# Patient Record
Sex: MALE | Race: WHITE | NOT HISPANIC OR LATINO | Employment: OTHER | URBAN - METROPOLITAN AREA
[De-identification: names, ages, dates, MRNs, and addresses within clinical notes are randomized per-mention and may not be internally consistent; named-entity substitution may affect disease eponyms.]

---

## 2017-01-16 DIAGNOSIS — G47.00 INSOMNIA: ICD-10-CM

## 2017-01-17 RX ORDER — DOXEPIN HYDROCHLORIDE 150 MG/1
CAPSULE ORAL
Qty: 30 CAPSULE | Refills: 0 | Status: SHIPPED | OUTPATIENT
Start: 2017-01-17 | End: 2017-02-14 | Stop reason: SDUPTHER

## 2017-02-13 DIAGNOSIS — G47.00 INSOMNIA: ICD-10-CM

## 2017-02-13 RX ORDER — TEMAZEPAM 30 MG/1
CAPSULE ORAL
Qty: 30 CAPSULE | Refills: 0 | Status: CANCELLED | OUTPATIENT
Start: 2017-02-13

## 2017-02-14 DIAGNOSIS — G47.00 INSOMNIA, UNSPECIFIED TYPE: ICD-10-CM

## 2017-02-14 NOTE — TELEPHONE ENCOUNTER
----- Message from Alcides Vance sent at 2017  4:12 PM CST -----  Contact: same  Patient called in and is requesting refills on the following two Rx's:    1.  Doxepin 150 mgs     [facility] [address] [address] AT [facility] [address] & [address] [address]  Phone: [phone] Fax: [phone]    2.  Temazepam 30mgs (refill )    [employer] [facility] [address] [facility]  [facility] & [place]   [facility] [address]  Phone: [phone] Fax: [phone]    Patient call back number is [phone]

## 2017-02-15 ENCOUNTER — OFFICE VISIT (OUTPATIENT)
Dept: FAMILY MEDICINE | Facility: CLINIC | Age: 72
End: 2017-02-15
Payer: MEDICARE

## 2017-02-15 VITALS
DIASTOLIC BLOOD PRESSURE: 70 MMHG | BODY MASS INDEX: 25.16 KG/M2 | HEIGHT: 71 IN | SYSTOLIC BLOOD PRESSURE: 123 MMHG | WEIGHT: 179.69 LBS | HEART RATE: 94 BPM

## 2017-02-15 DIAGNOSIS — I10 ESSENTIAL HYPERTENSION: Primary | ICD-10-CM

## 2017-02-15 DIAGNOSIS — K58.9 IRRITABLE BOWEL SYNDROME, UNSPECIFIED TYPE: ICD-10-CM

## 2017-02-15 DIAGNOSIS — E03.9 ACQUIRED HYPOTHYROIDISM: ICD-10-CM

## 2017-02-15 DIAGNOSIS — G47.00 INSOMNIA, UNSPECIFIED TYPE: ICD-10-CM

## 2017-02-15 PROCEDURE — 99213 OFFICE O/P EST LOW 20 MIN: CPT | Mod: PBBFAC,PO | Performed by: FAMILY MEDICINE

## 2017-02-15 PROCEDURE — 99999 PR PBB SHADOW E&M-EST. PATIENT-LVL III: CPT | Mod: PBBFAC,,, | Performed by: FAMILY MEDICINE

## 2017-02-15 PROCEDURE — 99214 OFFICE O/P EST MOD 30 MIN: CPT | Mod: S$PBB,,, | Performed by: FAMILY MEDICINE

## 2017-02-15 RX ORDER — DOXEPIN HYDROCHLORIDE 150 MG/1
CAPSULE ORAL
Qty: 30 CAPSULE | Refills: 0 | Status: SHIPPED | OUTPATIENT
Start: 2017-02-15 | End: 2017-02-15 | Stop reason: SDUPTHER

## 2017-02-15 RX ORDER — DOXEPIN HYDROCHLORIDE 150 MG/1
CAPSULE ORAL
Qty: 30 CAPSULE | Refills: 0 | Status: SHIPPED | OUTPATIENT
Start: 2017-02-15 | End: 2017-03-17 | Stop reason: SDUPTHER

## 2017-02-15 RX ORDER — MELOXICAM 15 MG/1
15 TABLET ORAL DAILY
Qty: 30 TABLET | Refills: 0 | Status: SHIPPED | OUTPATIENT
Start: 2017-02-15 | End: 2017-12-12 | Stop reason: SDUPTHER

## 2017-02-15 RX ORDER — TEMAZEPAM 30 MG/1
CAPSULE ORAL
Qty: 30 CAPSULE | Refills: 0 | Status: SHIPPED | OUTPATIENT
Start: 2017-02-15 | End: 2017-05-03 | Stop reason: SDUPTHER

## 2017-02-15 RX ORDER — TEMAZEPAM 30 MG/1
CAPSULE ORAL
Qty: 30 CAPSULE | Refills: 5 | Status: SHIPPED | OUTPATIENT
Start: 2017-02-15 | End: 2017-02-15 | Stop reason: SDUPTHER

## 2017-02-15 NOTE — MR AVS SNAPSHOT
[facility]  [address]  [facility] [address]  Phone: [phone]  Fax: [phone]                  Eric Buitrago   2/15/2017 9:00 AM   Office Visit    Description:  Male : 1945   Provider:  Shawn Scott MD   Department:  [facility]           Reason for Visit     Medication Refill           Diagnoses this Visit        Comments    Insomnia, unspecified type                To Do List           Future Appointments        Provider Department Dept Phone    2017 8:30 AM LAB, [place] [facility] [phone]    5/3/2017 1:20 PM Shawn Scott MD [facility] [phone]      Goals (5 Years of Data)     None       These Medications        Disp Refills Start End    doxepin (SINEQUAN) 150 MG Cap 30 capsule 0 2/15/2017     TAKE 1 CAPSULE BY MOUTH EVERY DAY    Pharmacy: [facility] [address] Ph #: [phone]       temazepam (RESTORIL) 30 mg capsule 30 capsule 0 2/15/2017     TAKE ONE CAPSULE BY MOUTH AT BEDTIME AS DIRECTED    Pharmacy: [facility] [place] [address] & [facility] Ph #: [phone]       meloxicam (MOBIC) 15 MG tablet 30 tablet 0 2/15/2017     Take 1 tablet (15 mg total) by mouth once daily. - Oral    Pharmacy: [facility] [place] [address] & [facility] Ph #: [phone]         [facility]     [facility] Care [facility] -  Assistance  Registered nurses in the [facility] provide clinical advisement, health education, appointment booking, and other advisory services.  Call for this free service at [phone].             Medications           Message regarding Medications     Verify the changes and/or additions to your medication regime listed below are the same as discussed with your  "clinician today.  If any of these changes or additions are incorrect, please notify your healthcare provider.        START taking these NEW medications        Refills    meloxicam (MOBIC) 15 MG tablet 0    Sig: Take 1 tablet (15 mg total) by mouth once daily.    Class: Normal    Route: Oral      STOP taking these medications     azithromycin (ZITHROMAX Z-CLAUDIA) 250 MG tablet Take 2 tablets on day 1, then 1 tablet daily on days 2 - 5.           Verify that the below list of medications is an accurate representation of the medications you are currently taking.  If none reported, the list may be blank. If incorrect, please contact your healthcare provider. Carry this list with you in case of emergency.           Current Medications     azelastine (ASTELIN) 137 mcg (0.1 %) nasal spray PLACE 1 SPRAY INTO EACH NOSTRIL TWICE DAILY    doxepin (SINEQUAN) 150 MG Cap TAKE 1 CAPSULE BY MOUTH EVERY DAY    fexofenadine (ALLEGRA) 60 MG tablet Take 1 tablet by mouth.    levothyroxine (SYNTHROID) 100 MCG tablet Take 1 tablet (100 mcg total) by mouth once daily.    lisinopril 10 MG tablet Take 1 tablet (10 mg total) by mouth once daily.    mometasone (NASONEX) 50 mcg/actuation nasal spray SPRAY TWICE IN BOTH NOSTRILS DAILY AS NEEDED    tamsulosin (FLOMAX) 0.4 mg Cp24 Take 1 capsule (0.4 mg total) by mouth every evening.    temazepam (RESTORIL) 30 mg capsule TAKE ONE CAPSULE BY MOUTH AT BEDTIME AS DIRECTED    meloxicam (MOBIC) 15 MG tablet Take 1 tablet (15 mg total) by mouth once daily.           Clinical Reference Information           Your Vitals Were     BP Pulse Height Weight BMI    123/70 94 5' 11" (1.803 m) 81.5 kg (179 lb 10.8 oz) 25.06 kg/m2      Blood Pressure          Most Recent Value    BP  123/70      Allergies as of 2/15/2017     No Known Drug Allergies      Immunizations Administered on Date of Encounter - 2/15/2017     Name Date Dose VIS Date Route    Pneumococcal Conjugate - 13 Valent  Incomplete 0.5 mL 11/5/2015 " Intramuscular      Orders Placed During Today's Visit      Normal Orders This Visit    Pneumococcal Conjugate Vaccine (13 Valent) (IM)       [facility] Sign-Up     Activating your [facility] account is as easy as 1-2-3!     1) Visit [email or web address], select Sign Up Now, enter this activation code and your date of birth, then select Next.  [email or web address]  Expires: 4/1/2017  8:46 AM      2) Create a username and password to use when you visit [facility] in the future and select a security question in case you lose your password and select Next.    3) Enter your e-mail address and click Sign Up!    Additional Information  If you have questions, please e-mail [email or web address] or call [phone] to talk to our [facility] staff. Remember, [facility] is NOT to be used for urgent needs. For medical emergencies, dial 911.         Language Assistance Services     ATTENTION: Language assistance services are available, free of charge. Please call [phone].      ATENCIÓN: Si habla tristian, tiene a odonnell disposición servicios gratuitos de asistencia lingüística. Llame al [phone].     CHÚ Ý: N?u b?n nói Ti?ng Vi?t, có các d?ch v? h? tr? ngôn ng? mi?n phí dành cho b?n. G?i s? [phone].         [facility] complies with applicable Federal civil rights laws and does not discriminate on the basis of race, color, national origin, age, disability, or sex.

## 2017-02-19 NOTE — PROGRESS NOTES
HISTORY OF PRESENT ILLNESS:  A pleasant male well known to me, 71 years of age.    He has got a history of chronic insomnia, hypothyroidism, hypertension and   irritable bowel syndrome.  Nonsmoker, retired businessman, .  Medications   were reviewed.  We discussed health maintenance and cancer screening.    PHYSICAL EXAMINATION:  Chest clear.  Regular rhythm.  No murmur or gallop.    Abdomen soft, nontender.  No peripheral edema.  No scleral icterus, jaundice or   hepatosplenomegaly.  No thyroidal tenderness.    ASSESSMENT:  Hypertension, insomnia, hypothyroidism and irritable bowel   syndrome.    PLAN:  He is doing well on all of his medications.  We will update Prevnar 13.    We discussed the controlled nature of his Restoril.  I will see him back in four   months with health maintenance and cancer screening.      SANIA/ANDREA  dd: 02/19/2017 11:21:49 (CST)  td: 02/19/2017 18:30:42 (CST)  Doc ID   #[id number]  Job ID #[id number]    CC:

## 2017-03-17 DIAGNOSIS — G47.00 INSOMNIA, UNSPECIFIED TYPE: ICD-10-CM

## 2017-03-17 RX ORDER — DOXEPIN HYDROCHLORIDE 150 MG/1
CAPSULE ORAL
Qty: 30 CAPSULE | Refills: 2 | Status: SHIPPED | OUTPATIENT
Start: 2017-03-17 | End: 2017-06-06 | Stop reason: SDUPTHER

## 2017-03-17 NOTE — TELEPHONE ENCOUNTER
----- Message from Tomasa Pollack sent at 3/17/2017  9:34 AM CDT -----  Contact: [phone]    Calling to  Get a  Refill  On  Med /doxepin 150  Mg  Cap //please call     [facility] [id number] [facility] [place] [address] [facility] [address] & [address] [address]  Phone: [phone] Fax: [phone]

## 2017-04-25 ENCOUNTER — LAB VISIT (OUTPATIENT)
Dept: LAB | Facility: HOSPITAL | Age: 72
End: 2017-04-25
Attending: FAMILY MEDICINE
Payer: MEDICARE

## 2017-04-25 DIAGNOSIS — E03.9 ACQUIRED HYPOTHYROIDISM: ICD-10-CM

## 2017-04-25 LAB
T4 FREE SERPL-MCNC: 1.01 NG/DL
TSH SERPL DL<=0.005 MIU/L-ACNC: 6.03 UIU/ML

## 2017-04-25 PROCEDURE — 36415 COLL VENOUS BLD VENIPUNCTURE: CPT | Mod: PO

## 2017-04-25 PROCEDURE — 84443 ASSAY THYROID STIM HORMONE: CPT

## 2017-04-25 PROCEDURE — 84439 ASSAY OF FREE THYROXINE: CPT

## 2017-05-03 ENCOUNTER — OFFICE VISIT (OUTPATIENT)
Dept: FAMILY MEDICINE | Facility: CLINIC | Age: 72
End: 2017-05-03
Payer: MEDICARE

## 2017-05-03 VITALS
WEIGHT: 174.38 LBS | DIASTOLIC BLOOD PRESSURE: 84 MMHG | SYSTOLIC BLOOD PRESSURE: 140 MMHG | RESPIRATION RATE: 12 BRPM | BODY MASS INDEX: 24.41 KG/M2 | HEART RATE: 88 BPM | HEIGHT: 71 IN | TEMPERATURE: 99 F

## 2017-05-03 DIAGNOSIS — E03.9 ACQUIRED HYPOTHYROIDISM: Primary | ICD-10-CM

## 2017-05-03 DIAGNOSIS — I10 ESSENTIAL HYPERTENSION: ICD-10-CM

## 2017-05-03 DIAGNOSIS — K58.9 IRRITABLE BOWEL SYNDROME, UNSPECIFIED TYPE: ICD-10-CM

## 2017-05-03 DIAGNOSIS — N40.0 BENIGN NON-NODULAR PROSTATIC HYPERPLASIA WITHOUT LOWER URINARY TRACT SYMPTOMS: ICD-10-CM

## 2017-05-03 DIAGNOSIS — G47.00 INSOMNIA, UNSPECIFIED TYPE: ICD-10-CM

## 2017-05-03 PROCEDURE — 99999 PR PBB SHADOW E&M-EST. PATIENT-LVL III: CPT | Mod: PBBFAC,,, | Performed by: FAMILY MEDICINE

## 2017-05-03 PROCEDURE — 99213 OFFICE O/P EST LOW 20 MIN: CPT | Mod: PBBFAC,PO | Performed by: FAMILY MEDICINE

## 2017-05-03 PROCEDURE — 99214 OFFICE O/P EST MOD 30 MIN: CPT | Mod: S$PBB,,, | Performed by: FAMILY MEDICINE

## 2017-05-03 RX ORDER — TAMSULOSIN HYDROCHLORIDE 0.4 MG/1
1 CAPSULE ORAL NIGHTLY
Qty: 30 CAPSULE | Refills: 5 | Status: SHIPPED | OUTPATIENT
Start: 2017-05-03 | End: 2017-11-13 | Stop reason: SDUPTHER

## 2017-05-03 RX ORDER — LEVOTHYROXINE SODIUM 125 UG/1
125 TABLET ORAL DAILY
Qty: 30 TABLET | Refills: 11 | Status: SHIPPED | OUTPATIENT
Start: 2017-05-03 | End: 2017-06-28

## 2017-05-03 RX ORDER — TEMAZEPAM 30 MG/1
CAPSULE ORAL
Qty: 30 CAPSULE | Refills: 2 | Status: SHIPPED | OUTPATIENT
Start: 2017-05-03 | End: 2017-06-06 | Stop reason: SDUPTHER

## 2017-05-03 NOTE — MR AVS SNAPSHOT
[facility]  [address]  [facility] [address]  Phone: [phone]  Fax: [phone]                  Eric Buitrago   5/3/2017 1:20 PM   Office Visit    Description:  Male : 1945   Provider:  Shawn Scott MD   Department:  [facility]           Reason for Visit     Hypothyroidism           Diagnoses this Visit        Comments    Acquired hypothyroidism    -  Primary     Essential hypertension         Irritable bowel syndrome, unspecified type         Insomnia, unspecified type         Benign non-nodular prostatic hyperplasia without lower urinary tract symptoms                To Do List           Goals (5 Years of Data)     None      Follow-Up and Disposition     Return in about 4 months (around 9/3/2017).       These Medications        Disp Refills Start End    levothyroxine (SYNTHROID) 125 MCG tablet 30 tablet 11 5/3/2017 5/3/2018    Take 1 tablet (125 mcg total) by mouth once daily. - Oral    Pharmacy: [facility] [address] Ph #: [phone]       tamsulosin (FLOMAX) 0.4 mg Cp24 30 capsule 5 5/3/2017     Take 1 capsule (0.4 mg total) by mouth every evening. - Oral    Pharmacy: [facility] [address] Ph #: [phone]       temazepam (RESTORIL) 30 mg capsule 30 capsule 2 5/3/2017     TAKE ONE CAPSULE BY MOUTH AT BEDTIME AS DIRECTED    Pharmacy: [facility] [address] Ph #: [phone]         [facility]     [facility] Care [facility] -  Assistance  Unless otherwise directed by your provider, please contact [facility], our nurse care line that is available for  assistance.     Registered nurses in the [facility] provide: appointment scheduling, clinical advisement, health education, and other advisory  "services.  Call: [phone] (toll free)               Medications           Message regarding Medications     Verify the changes and/or additions to your medication regime listed below are the same as discussed with your clinician today.  If any of these changes or additions are incorrect, please notify your healthcare provider.        START taking these NEW medications        Refills    levothyroxine (SYNTHROID) 125 MCG tablet 11    Sig: Take 1 tablet (125 mcg total) by mouth once daily.    Class: Normal    Route: Oral           Verify that the below list of medications is an accurate representation of the medications you are currently taking.  If none reported, the list may be blank. If incorrect, please contact your healthcare provider. Carry this list with you in case of emergency.           Current Medications     azelastine (ASTELIN) 137 mcg (0.1 %) nasal spray PLACE 1 SPRAY INTO EACH NOSTRIL TWICE DAILY    doxepin (SINEQUAN) 150 MG Cap TAKE 1 CAPSULE BY MOUTH EVERY DAY    fexofenadine (ALLEGRA) 60 MG tablet Take 1 tablet by mouth.    lisinopril 10 MG tablet Take 1 tablet (10 mg total) by mouth once daily.    mometasone (NASONEX) 50 mcg/actuation nasal spray SPRAY TWICE IN BOTH NOSTRILS DAILY AS NEEDED    tamsulosin (FLOMAX) 0.4 mg Cp24 Take 1 capsule (0.4 mg total) by mouth every evening.    levothyroxine (SYNTHROID) 125 MCG tablet Take 1 tablet (125 mcg total) by mouth once daily.    meloxicam (MOBIC) 15 MG tablet Take 1 tablet (15 mg total) by mouth once daily.    temazepam (RESTORIL) 30 mg capsule TAKE ONE CAPSULE BY MOUTH AT BEDTIME AS DIRECTED           Clinical Reference Information           Your Vitals Were     BP Pulse Temp Resp Height Weight    140/84 (BP Location: Left arm, Patient Position: Sitting) 88 98.5 °F (36.9 °C) (Oral) 12 5' 11" (1.803 m) 79.1 kg (174 lb 6.1 oz)    BMI                24.32 kg/m2          Blood Pressure          Most Recent Value    BP  (!)  140/84      Allergies as of " 5/3/2017     No Known Drug Allergies      Immunizations Administered on Date of Encounter - 5/3/2017     None      [facility] Sign-Up     Activating your [facility] account is as easy as 1-2-3!     1) Visit [email or web address], select Sign Up Now, enter this activation code and your date of birth, then select Next.  [email or web address]  Expires: 6/17/2017  2:13 PM      2) Create a username and password to use when you visit [facility] in the future and select a security question in case you lose your password and select Next.    3) Enter your e-mail address and click Sign Up!    Additional Information  If you have questions, please e-mail [email or web address] or call [phone] to talk to our [facility] staff. Remember, [facility] is NOT to be used for urgent needs. For medical emergencies, dial 911.         Language Assistance Services     ATTENTION: Language assistance services are available, free of charge. Please call [phone].      ATENCIÓN: Si habla tristian, tiene a odonnell disposición servicios gratuitos de asistencia lingüística. Llame al [phone].     TURNER Ý: N?u b?n nói Ti?ng Vi?t, có các d?ch v? h? tr? ngôn ng? mi?n phí dành cho b?n. G?i s? [phone].         [facility] complies with applicable Federal civil rights laws and does not discriminate on the basis of race, color, national origin, age, disability, or sex.

## 2017-05-15 NOTE — PROGRESS NOTES
A pleasant male here today.  He has a history of hypothyroidism, hypertension,   irritable bowel syndrome, chronic insomnia.  He also has BPH.  No aches, chills,   fever, cough, chest pain or shortness of breath.  No nausea, vomiting or   diarrhea.  He is sleeping well.  He is retired, nonsmoker,  with   children.    Pleasant male in no apparent distress.  Vital signs unremarkable.  No carotid   bruits.  No peripheral edema.  Abdomen soft and nontender.  No thyroidal   tenderness.    ASSESSMENT:  Hypothyroidism, hypertension, insomnia and irritable bowel   syndrome.    We reviewed each condition.  He is doing well.  We discussed health maintenance   and cancer screening.  He will need to see Urology periodically.  We will renew   medications appropriately.  We will see him back every four months.      LIDYA  dd: 05/14/2017 19:24:49 (CDT)  td: 05/14/2017 23:36:49 (CDT)  Doc ID   #[id number]  Job ID #[id number]    CC:

## 2017-05-23 NOTE — TELEPHONE ENCOUNTER
----- Message from Genie Day sent at 5/23/2017 12:59 PM CDT -----  Patient needs a refill of medication: Lisinopril called into the pharmacy. Please order or call patient back at [phone] if you have any questions. Thanks!

## 2017-05-24 RX ORDER — LISINOPRIL 10 MG/1
10 TABLET ORAL DAILY
Qty: 30 TABLET | Refills: 6 | Status: SHIPPED | OUTPATIENT
Start: 2017-05-24 | End: 2017-12-26 | Stop reason: SDUPTHER

## 2017-06-06 ENCOUNTER — OFFICE VISIT (OUTPATIENT)
Dept: FAMILY MEDICINE | Facility: CLINIC | Age: 72
End: 2017-06-06
Payer: MEDICARE

## 2017-06-06 VITALS
WEIGHT: 173.94 LBS | TEMPERATURE: 99 F | SYSTOLIC BLOOD PRESSURE: 124 MMHG | RESPIRATION RATE: 18 BRPM | DIASTOLIC BLOOD PRESSURE: 72 MMHG | BODY MASS INDEX: 24.35 KG/M2 | HEIGHT: 71 IN | HEART RATE: 80 BPM

## 2017-06-06 DIAGNOSIS — M47.816 SPONDYLOSIS OF LUMBAR REGION WITHOUT MYELOPATHY OR RADICULOPATHY: ICD-10-CM

## 2017-06-06 DIAGNOSIS — F51.01 PRIMARY INSOMNIA: ICD-10-CM

## 2017-06-06 DIAGNOSIS — Z13.6 SCREENING FOR AAA (ABDOMINAL AORTIC ANEURYSM): ICD-10-CM

## 2017-06-06 DIAGNOSIS — Z12.5 SCREENING FOR PROSTATE CANCER: ICD-10-CM

## 2017-06-06 DIAGNOSIS — Z23 NEED FOR TDAP VACCINATION: ICD-10-CM

## 2017-06-06 DIAGNOSIS — E03.9 HYPOTHYROIDISM, UNSPECIFIED TYPE: Primary | ICD-10-CM

## 2017-06-06 DIAGNOSIS — Z23 NEED FOR VACCINATION FOR PNEUMOCOCCUS: ICD-10-CM

## 2017-06-06 DIAGNOSIS — I10 ESSENTIAL HYPERTENSION: ICD-10-CM

## 2017-06-06 DIAGNOSIS — N40.0 BENIGN PROSTATIC HYPERPLASIA, PRESENCE OF LOWER URINARY TRACT SYMPTOMS UNSPECIFIED, UNSPECIFIED MORPHOLOGY: ICD-10-CM

## 2017-06-06 DIAGNOSIS — E78.5 HYPERLIPIDEMIA, UNSPECIFIED HYPERLIPIDEMIA TYPE: ICD-10-CM

## 2017-06-06 DIAGNOSIS — J30.9 CHRONIC ALLERGIC RHINITIS: ICD-10-CM

## 2017-06-06 DIAGNOSIS — K58.9 IRRITABLE BOWEL SYNDROME WITHOUT DIARRHEA: ICD-10-CM

## 2017-06-06 DIAGNOSIS — Z12.11 SCREEN FOR COLON CANCER: ICD-10-CM

## 2017-06-06 PROCEDURE — G0009 ADMIN PNEUMOCOCCAL VACCINE: HCPCS | Mod: S$GLB,,, | Performed by: INTERNAL MEDICINE

## 2017-06-06 PROCEDURE — 99214 OFFICE O/P EST MOD 30 MIN: CPT | Mod: S$GLB,,, | Performed by: INTERNAL MEDICINE

## 2017-06-06 PROCEDURE — 90670 PCV13 VACCINE IM: CPT | Mod: S$GLB,,, | Performed by: INTERNAL MEDICINE

## 2017-06-06 RX ORDER — DOXEPIN HYDROCHLORIDE 150 MG/1
CAPSULE ORAL
Qty: 30 CAPSULE | Refills: 5 | Status: SHIPPED | OUTPATIENT
Start: 2017-06-06 | End: 2017-12-12 | Stop reason: SDUPTHER

## 2017-06-06 RX ORDER — TEMAZEPAM 30 MG/1
CAPSULE ORAL
Qty: 30 CAPSULE | Refills: 2
Start: 2017-06-06 | End: 2017-11-13 | Stop reason: SDUPTHER

## 2017-06-06 NOTE — PROGRESS NOTES
Subjective:       Patient ID: Eric Buitrago is a 71 y.o. male.    Medication List with Changes/Refills   New Medications    DIPHTH,PERTUS,ACELL,,TETANUS (BOOSTRIX TDAP) 2.5-8-5 LF-MCG-LF/0.5ML SYRG INJECTION    Inject 0.5 mLs into the muscle once.   Current Medications    AZELASTINE (ASTELIN) 137 MCG (0.1 %) NASAL SPRAY    PLACE 1 SPRAY INTO EACH NOSTRIL TWICE DAILY    FEXOFENADINE (ALLEGRA) 60 MG TABLET    Take 1 tablet by mouth.    LEVOTHYROXINE (SYNTHROID) 125 MCG TABLET    Take 1 tablet (125 mcg total) by mouth once daily.    LISINOPRIL 10 MG TABLET    Take 1 tablet (10 mg total) by mouth once daily.    MELOXICAM (MOBIC) 15 MG TABLET    Take 1 tablet (15 mg total) by mouth once daily.    MOMETASONE (NASONEX) 50 MCG/ACTUATION NASAL SPRAY    SPRAY TWICE IN BOTH NOSTRILS DAILY AS NEEDED    TAMSULOSIN (FLOMAX) 0.4 MG CP24    Take 1 capsule (0.4 mg total) by mouth every evening.   Changed and/or Refilled Medications    Modified Medication Previous Medication    DOXEPIN (SINEQUAN) 150 MG CAP doxepin (SINEQUAN) 150 MG Cap       TAKE 1 CAPSULE BY MOUTH EVERY DAY    TAKE 1 CAPSULE BY MOUTH EVERY DAY    TEMAZEPAM (RESTORIL) 30 MG CAPSULE temazepam (RESTORIL) 30 mg capsule       TAKE ONE CAPSULE BY MOUTH AT BEDTIME AS DIRECTED    TAKE ONE CAPSULE BY MOUTH AT BEDTIME AS DIRECTED       Chief Complaint: Establish Care  He is here today to establish care. He is a former patient of Dr. Scott.     He has hypothyroid diagnosed about one year ago. He was taking levothyroxine 100 mcg but his TSH on 4/2017 was 6.03 and his levothyroxine was increased to 125 mcg qday. He has been taking this dose for 3 weeks and has not had a TSH rechecked.     He has hypertension controlled on lisinopril 10 mg qday. He denies any CAD. He denies chest pain or shortness of breath.     He has mildly elevated lipids check in 2015 were 178/104/39/118. He is not on treatment. He does not take aspirin.     He has BPH which is controlled with  flomax. He says this helps with his frequency and nocturia.     He has seasonal allergies controlled with allegra and astelin. He denies any active symptoms.      He has IBS for many years. He was seen by GI about 10 years ago and started on doxepin for spastic colon.  He says this has worked and he no longer has any symptoms.  He says he has had multiple colonoscopies in the past.      He has insomnia that is controlled with PRN temazepam for last 5 years. He takes one tablet every 3-4 nights when he can not fall asleep. He says it works and denies side effects.      He occasionally has low back pain if he over exerts himself. He will take mobic with good relief.      Colonoscopy---more than 10 years ago  Tdap---sanya than 10 years  Influenza vaccine---11/2016  Pneumovax 23----none  Prevnar 13----none  Shingles vaccine----- 10/2011     Review of Systems   Constitutional: Negative for appetite change, fatigue, fever and unexpected weight change.   HENT: Negative for congestion, ear pain, hearing loss, sore throat and trouble swallowing.    Eyes: Negative for pain and visual disturbance.   Respiratory: Negative for cough, chest tightness, shortness of breath and wheezing.    Cardiovascular: Negative for chest pain, palpitations and leg swelling.   Gastrointestinal: Negative for abdominal pain, blood in stool, constipation, diarrhea, nausea and vomiting.   Endocrine: Negative for polyuria.   Genitourinary: Negative for dysuria and hematuria.   Musculoskeletal: Positive for back pain. Negative for arthralgias and myalgias.   Skin: Negative for rash.   Neurological: Negative for dizziness, weakness, numbness and headaches.   Hematological: Does not bruise/bleed easily.   Psychiatric/Behavioral: Positive for sleep disturbance. Negative for dysphoric mood and suicidal ideas. The patient is not nervous/anxious.        Objective:      Vitals:    06/06/17 0823   BP: 124/72   BP Location: Left arm   Patient Position: Sitting  "  BP Method: Manual   Pulse: 80   Resp: 18   Temp: 99 °F (37.2 °C)   TempSrc: Oral   Weight: 78.9 kg (173 lb 15.1 oz)   Height: 5' 11" (1.803 m)     Body mass index is 24.26 kg/m².  Physical Exam    General appearance: No acute distress, cooperative  Eyes: PERRL, EOMI, conjunctiva clear  Ears: normal external ear and pinna, tm clear without drainage, canals clear  Nose: Normal mucosa without drainage  Throat: no exudates or erythema, tonsils not enlarged  Mouth: no sores or lesions, moist mucous membranes, good dentition  Neck: FROM, soft, supple, no thyromegaly, no bruits  Lymph: no anterior or posterior cervical adenopathy  Heart::  Regular rate and rhythm, no murmur  Lung: Clear to ascultation bilaterally, no wheezing, no rales, no rhonchi, no distress  Abdomen: Soft, nontender, no distention, no hepatosplenomegaly, bowel sounds normal, no guarding, no rebound, no peritoneal signs  Skin: no rashes, no lesions  Extremities: no edema, no cyanosis  Neuro: CN 2-12 intact, 5/5 muscle strength upper and lower extremity bilaterally, 2+ DTRs UE and LE bilaterally, normal gait, normal sensation  Peripheral pulses: 2+ pedal pulses bilaterally, good perfusion and color  Musculoskeletal: FROM, good strenth, no tenderness  Joint: normal appearance, no swelling, no warmth, no deformity in all joints    Assessment:       1. Hypothyroidism, unspecified type    2. Essential hypertension    3. Hyperlipidemia, unspecified hyperlipidemia type    4. Benign prostatic hyperplasia, presence of lower urinary tract symptoms unspecified, unspecified morphology    5. Irritable bowel syndrome without diarrhea    6. Primary insomnia    7. Spondylosis of lumbar region without myelopathy or radiculopathy    8. Chronic allergic rhinitis    9. Screening for AAA (abdominal aortic aneurysm)    10. Screening for prostate cancer    11. Screen for colon cancer    12. Need for Tdap vaccination    13. Need for vaccination for pneumococcus        Plan: "       Hypothyroidism, unspecified type  Uncontrolled and dose increased 3 weeks ago. Will recheck TSH in 3 weeks on this new dose.   -     TSH; Future; Expected date: 06/06/2017    Essential hypertension  Good control on this regimen.   -     CBC auto differential; Future; Expected date: 06/06/2017  -     Comprehensive metabolic panel; Future; Expected date: 06/06/2017    Hyperlipidemia, unspecified hyperlipidemia type  Mildly increased LDL from 2015. Will recheck today and if elevated will recommend a statin.    -     Lipid panel; Future; Expected date: 06/06/2017    Benign prostatic hyperplasia, presence of lower urinary tract symptoms unspecified, unspecified morphology  Controlled on fomax.     Irritable bowel syndrome without diarrhea  Stable and no flares since starting doxepin many years ago.     Primary insomnia  Controlled on daily doxepin and PRN temazepam.  He is only taking temazepam every 3-4 days.   -     doxepin (SINEQUAN) 150 MG Cap; TAKE 1 CAPSULE BY MOUTH EVERY DAY  Dispense: 30 capsule; Refill: 5    Spondylosis of lumbar region without myelopathy or radiculopathy  Only with intermittent flare relieved with mobic.     Chronic allergic rhinitis  Stable and well controlled.      Screening for AAA (abdominal aortic aneurysm)  -     US Abdominal Aorta; Future; Expected date: 06/06/2017    Screening for prostate cancer  -     PSA, Screening; Future; Expected date: 06/06/2017    Screen for colon cancer  -     Case request GI: COLONOSCOPY    Need for Tdap vaccination  -     diphth,pertus,acell,,tetanus (BOOSTRIX TDAP) 2.5-8-5 Lf-mcg-Lf/0.5mL Syrg injection; Inject 0.5 mLs into the muscle once.  Dispense: 0.5 mL; Refill: 0    Need for vaccination for pneumococcus  -     Pneumococcal Conjugate Vaccine (13 Valent) (IM)    Return in about 6 months (around 12/6/2017) for chronic medical issues.

## 2017-06-07 ENCOUNTER — TELEPHONE (OUTPATIENT)
Dept: GASTROENTEROLOGY | Facility: CLINIC | Age: 72
End: 2017-06-07

## 2017-06-15 ENCOUNTER — HOSPITAL ENCOUNTER (OUTPATIENT)
Dept: RADIOLOGY | Facility: HOSPITAL | Age: 72
Discharge: HOME | End: 2017-06-15
Attending: INTERNAL MEDICINE
Payer: MEDICARE

## 2017-06-15 ENCOUNTER — TELEPHONE (OUTPATIENT)
Dept: FAMILY MEDICINE | Facility: CLINIC | Age: 72
End: 2017-06-15

## 2017-06-15 DIAGNOSIS — Z13.6 SCREENING FOR AAA (ABDOMINAL AORTIC ANEURYSM): ICD-10-CM

## 2017-06-15 PROCEDURE — 76775 US EXAM ABDO BACK WALL LIM: CPT | Mod: 26,,, | Performed by: RADIOLOGY

## 2017-06-15 PROCEDURE — 76775 US EXAM ABDO BACK WALL LIM: CPT | Mod: TC,PO

## 2017-06-27 ENCOUNTER — LAB VISIT (OUTPATIENT)
Dept: LAB | Facility: HOSPITAL | Age: 72
End: 2017-06-27
Attending: INTERNAL MEDICINE
Payer: MEDICARE

## 2017-06-27 DIAGNOSIS — E78.5 HYPERLIPIDEMIA, UNSPECIFIED HYPERLIPIDEMIA TYPE: ICD-10-CM

## 2017-06-27 DIAGNOSIS — I10 ESSENTIAL HYPERTENSION: ICD-10-CM

## 2017-06-27 DIAGNOSIS — E03.9 HYPOTHYROIDISM, UNSPECIFIED TYPE: ICD-10-CM

## 2017-06-27 DIAGNOSIS — Z12.5 SCREENING FOR PROSTATE CANCER: ICD-10-CM

## 2017-06-27 LAB
ALBUMIN SERPL BCP-MCNC: 3.9 G/DL
ALP SERPL-CCNC: 58 U/L
ALT SERPL W/O P-5'-P-CCNC: 23 U/L
ANION GAP SERPL CALC-SCNC: 7 MMOL/L
AST SERPL-CCNC: 19 U/L
BASOPHILS # BLD AUTO: 0.02 K/UL
BASOPHILS NFR BLD: 0.3 %
BILIRUB SERPL-MCNC: 0.7 MG/DL
BUN SERPL-MCNC: 16 MG/DL
CALCIUM SERPL-MCNC: 9.4 MG/DL
CHLORIDE SERPL-SCNC: 106 MMOL/L
CHOLEST/HDLC SERPL: 4.1 {RATIO}
CO2 SERPL-SCNC: 28 MMOL/L
COMPLEXED PSA SERPL-MCNC: 1.2 NG/ML
CREAT SERPL-MCNC: 1 MG/DL
DIFFERENTIAL METHOD: ABNORMAL
EOSINOPHIL # BLD AUTO: 0.4 K/UL
EOSINOPHIL NFR BLD: 6.5 %
ERYTHROCYTE [DISTWIDTH] IN BLOOD BY AUTOMATED COUNT: 13.3 %
EST. GFR  (AFRICAN AMERICAN): >60 ML/MIN/1.73 M^2
EST. GFR  (NON AFRICAN AMERICAN): >60 ML/MIN/1.73 M^2
GLUCOSE SERPL-MCNC: 112 MG/DL
HCT VFR BLD AUTO: 43.9 %
HDL/CHOLESTEROL RATIO: 24.7 %
HDLC SERPL-MCNC: 154 MG/DL
HDLC SERPL-MCNC: 38 MG/DL
HGB BLD-MCNC: 15.5 G/DL
LDLC SERPL CALC-MCNC: 100.8 MG/DL
LYMPHOCYTES # BLD AUTO: 2 K/UL
LYMPHOCYTES NFR BLD: 30.3 %
MCH RBC QN AUTO: 32 PG
MCHC RBC AUTO-ENTMCNC: 35.3 %
MCV RBC AUTO: 91 FL
MONOCYTES # BLD AUTO: 0.6 K/UL
MONOCYTES NFR BLD: 9.6 %
NEUTROPHILS # BLD AUTO: 3.4 K/UL
NEUTROPHILS NFR BLD: 53 %
NONHDLC SERPL-MCNC: 116 MG/DL
PLATELET # BLD AUTO: 242 K/UL
PMV BLD AUTO: 9.9 FL
POTASSIUM SERPL-SCNC: 5.4 MMOL/L
PROT SERPL-MCNC: 7.1 G/DL
RBC # BLD AUTO: 4.84 M/UL
SODIUM SERPL-SCNC: 141 MMOL/L
T4 FREE SERPL-MCNC: 0.98 NG/DL
TRIGL SERPL-MCNC: 76 MG/DL
TSH SERPL DL<=0.005 MIU/L-ACNC: 4.51 UIU/ML
WBC # BLD AUTO: 6.46 K/UL

## 2017-06-27 PROCEDURE — 36415 COLL VENOUS BLD VENIPUNCTURE: CPT | Mod: PO

## 2017-06-27 PROCEDURE — 80053 COMPREHEN METABOLIC PANEL: CPT

## 2017-06-27 PROCEDURE — 84439 ASSAY OF FREE THYROXINE: CPT

## 2017-06-27 PROCEDURE — 84153 ASSAY OF PSA TOTAL: CPT

## 2017-06-27 PROCEDURE — 80061 LIPID PANEL: CPT

## 2017-06-27 PROCEDURE — 84443 ASSAY THYROID STIM HORMONE: CPT

## 2017-06-27 PROCEDURE — 85025 COMPLETE CBC W/AUTO DIFF WBC: CPT

## 2017-06-28 ENCOUNTER — TELEPHONE (OUTPATIENT)
Dept: FAMILY MEDICINE | Facility: CLINIC | Age: 72
End: 2017-06-28

## 2017-06-28 DIAGNOSIS — E87.5 HYPERKALEMIA: Primary | ICD-10-CM

## 2017-06-28 DIAGNOSIS — E03.9 HYPOTHYROIDISM, UNSPECIFIED TYPE: ICD-10-CM

## 2017-06-28 RX ORDER — LEVOTHYROXINE SODIUM 137 UG/1
137 TABLET ORAL
Qty: 30 TABLET | Refills: 11 | Status: SHIPPED | OUTPATIENT
Start: 2017-06-28 | End: 2018-07-05 | Stop reason: SDUPTHER

## 2017-06-28 NOTE — TELEPHONE ENCOUNTER
Please let him know that his PSA was normal. Blood counts were normal.     Liver and kidney were normal.     His cholesterol looks good.    His thyroid is still a little slow and I would like to increase his thyroid medication.  He will need to recheck this in 6 weeks (on the higher dose). Please set this up.      Also his potassium was high.  I would also like to recheck this reading because it has never been high in the past.  If it remains high then I will have to change his BP medication.     Set him up to go to the lab nonfasting for potassium this week or early next week.   Set him up for TSH in 6 weeks    Thanks

## 2017-06-30 ENCOUNTER — LAB VISIT (OUTPATIENT)
Dept: LAB | Facility: HOSPITAL | Age: 72
End: 2017-06-30
Attending: INTERNAL MEDICINE
Payer: MEDICARE

## 2017-06-30 DIAGNOSIS — E87.5 HYPERKALEMIA: ICD-10-CM

## 2017-06-30 LAB
ANION GAP SERPL CALC-SCNC: 7 MMOL/L
BUN SERPL-MCNC: 15 MG/DL
CALCIUM SERPL-MCNC: 9.2 MG/DL
CHLORIDE SERPL-SCNC: 105 MMOL/L
CO2 SERPL-SCNC: 26 MMOL/L
CREAT SERPL-MCNC: 1 MG/DL
EST. GFR  (AFRICAN AMERICAN): >60 ML/MIN/1.73 M^2
EST. GFR  (NON AFRICAN AMERICAN): >60 ML/MIN/1.73 M^2
GLUCOSE SERPL-MCNC: 135 MG/DL
POTASSIUM SERPL-SCNC: 4.9 MMOL/L
SODIUM SERPL-SCNC: 138 MMOL/L

## 2017-06-30 PROCEDURE — 36415 COLL VENOUS BLD VENIPUNCTURE: CPT | Mod: PO

## 2017-06-30 PROCEDURE — 80048 BASIC METABOLIC PNL TOTAL CA: CPT

## 2017-07-03 ENCOUNTER — TELEPHONE (OUTPATIENT)
Dept: FAMILY MEDICINE | Facility: CLINIC | Age: 72
End: 2017-07-03

## 2017-07-03 NOTE — TELEPHONE ENCOUNTER
Please let him know that his repeat potassium was perfect. No need to worry and no change in his BP medication.    He should still recheck his thyroid on new dose as we discussed previously.     Thanks.

## 2017-07-05 NOTE — TELEPHONE ENCOUNTER
Notified patient of provider instructions and lab appt for recheck, patient verbalized understanding.

## 2017-07-05 NOTE — TELEPHONE ENCOUNTER
Yes I would like him to take the higher dose of levothyroxine and recheck labs after 6 weeks.     Thanks.

## 2017-07-05 NOTE — TELEPHONE ENCOUNTER
"Notified pt of lab results. Pt stated verbal understanding. Pt also stated, "I've only taken 3 pills of the new rx. Does she still want me to have the lab re-checked next month?" Please advise.  "

## 2017-08-09 ENCOUNTER — LAB VISIT (OUTPATIENT)
Dept: LAB | Facility: HOSPITAL | Age: 72
End: 2017-08-09
Attending: INTERNAL MEDICINE
Payer: MEDICARE

## 2017-08-09 DIAGNOSIS — E03.9 HYPOTHYROIDISM, UNSPECIFIED TYPE: ICD-10-CM

## 2017-08-09 LAB — TSH SERPL DL<=0.005 MIU/L-ACNC: 1.66 UIU/ML

## 2017-08-09 PROCEDURE — 36415 COLL VENOUS BLD VENIPUNCTURE: CPT | Mod: PO

## 2017-08-09 PROCEDURE — 84443 ASSAY THYROID STIM HORMONE: CPT

## 2017-08-10 ENCOUNTER — TELEPHONE (OUTPATIENT)
Dept: FAMILY MEDICINE | Facility: CLINIC | Age: 72
End: 2017-08-10

## 2017-08-10 NOTE — TELEPHONE ENCOUNTER
Please let him know that his thyroid is now at a good level.  He should continue levothyroxine at 137 mcg qday.     thanks

## 2017-09-18 DIAGNOSIS — J01.80 OTHER ACUTE SINUSITIS, RECURRENCE NOT SPECIFIED: ICD-10-CM

## 2017-09-18 DIAGNOSIS — J06.9 UPPER RESPIRATORY TRACT INFECTION, UNSPECIFIED TYPE: ICD-10-CM

## 2017-09-18 RX ORDER — AZELASTINE 1 MG/ML
SPRAY, METERED NASAL
Qty: 30 ML | Refills: 11 | Status: SHIPPED | OUTPATIENT
Start: 2017-09-18 | End: 2018-10-22 | Stop reason: SDUPTHER

## 2017-09-18 NOTE — TELEPHONE ENCOUNTER
----- Message from Krystle Chung sent at 9/18/2017 10:14 AM CDT -----  Contact: [phone]  Patient called asking for a new rx azelastine 0.1 % 137 mgg  nasal spray with [facility] [place] Thanks!

## 2017-11-13 ENCOUNTER — IMMUNIZATION (OUTPATIENT)
Dept: FAMILY MEDICINE | Facility: CLINIC | Age: 72
End: 2017-11-13
Payer: MEDICARE

## 2017-11-13 DIAGNOSIS — N40.0 BENIGN NON-NODULAR PROSTATIC HYPERPLASIA WITHOUT LOWER URINARY TRACT SYMPTOMS: ICD-10-CM

## 2017-11-13 PROCEDURE — 90662 IIV NO PRSV INCREASED AG IM: CPT | Mod: S$GLB,,, | Performed by: INTERNAL MEDICINE

## 2017-11-13 PROCEDURE — G0008 ADMIN INFLUENZA VIRUS VAC: HCPCS | Mod: S$GLB,,, | Performed by: INTERNAL MEDICINE

## 2017-11-15 ENCOUNTER — TELEPHONE (OUTPATIENT)
Dept: FAMILY MEDICINE | Facility: CLINIC | Age: 72
End: 2017-11-15

## 2017-11-15 RX ORDER — TAMSULOSIN HYDROCHLORIDE 0.4 MG/1
CAPSULE ORAL
Qty: 90 CAPSULE | Refills: 3 | Status: SHIPPED | OUTPATIENT
Start: 2017-11-15 | End: 2018-11-02 | Stop reason: SDUPTHER

## 2017-11-15 RX ORDER — TEMAZEPAM 30 MG/1
CAPSULE ORAL
Qty: 30 CAPSULE | Refills: 0 | Status: SHIPPED | OUTPATIENT
Start: 2017-11-15 | End: 2018-02-20 | Stop reason: SDUPTHER

## 2017-11-15 NOTE — TELEPHONE ENCOUNTER
----- Message from Daniela Quintana sent at 11/15/2017  3:46 PM CST -----  Contact: Sabina with [employer] ph#[phone]   Sabina with [employer] ph#[phone]   Reference#[id number]  Please call she have additional questions in regards to temazepam 30mg cap

## 2017-11-28 ENCOUNTER — PATIENT OUTREACH (OUTPATIENT)
Dept: ADMINISTRATIVE | Facility: HOSPITAL | Age: 72
End: 2017-11-28

## 2017-11-28 NOTE — LETTER
November 28, 2017    Eric Trung Iftikhar  [address] [place] [address]             [facility]  [address]  [place] [address]  Phone: [phone] Dear Mr. Buitrago:    [facility] is committed to your overall health.  To help you get the most out of each of your visits, we will review your information to make sure you are up to date on all of your recommended tests and/or procedures.      Dr. Maddi Gastelum has found that your chart shows you may be due for colon cancer screening and hepatitis C screening.     If you have had any of the above done at another facility, please bring the records or information with you so that your record at [facility] will be complete.  If you would like to schedule any of these, please contact me.    If you are currently taking medication, please bring it with you to your appointment for review.    Also, if you have any type of Advanced Directives, please bring them with you to your office visit so we may scan them into your chart.    If you have any questions or concerns, please don't hesitate to call.    Thank you for letting us care for you,  Heike Veras LPN Clinical Care Coordinator  [facility]  [phone]

## 2017-12-06 ENCOUNTER — TELEPHONE (OUTPATIENT)
Dept: FAMILY MEDICINE | Facility: CLINIC | Age: 72
End: 2017-12-06

## 2017-12-06 NOTE — TELEPHONE ENCOUNTER
----- Message from Ellen Avendaño sent at 12/6/2017  8:57 AM CST -----  Contact: Gabriella with [employer]  Gabriella has a few questions on a medication that the patient is taking, temazepam (RESTORIL) 30 mg capsule.  Gabriella will be faxing over some information that she needs.  Case# [id number]  Call Back#[phone]  Thanks

## 2017-12-06 NOTE — TELEPHONE ENCOUNTER
Called Gabriella with [facility],    Needed verification of Restoril and to see if pt had ever tried rosarium. Pt had no hx of that medication but has been on Restoril since 2012, no further needs.

## 2017-12-12 ENCOUNTER — OFFICE VISIT (OUTPATIENT)
Dept: FAMILY MEDICINE | Facility: CLINIC | Age: 72
End: 2017-12-12
Payer: MEDICARE

## 2017-12-12 VITALS
TEMPERATURE: 98 F | BODY MASS INDEX: 23.86 KG/M2 | SYSTOLIC BLOOD PRESSURE: 118 MMHG | RESPIRATION RATE: 18 BRPM | HEIGHT: 71 IN | DIASTOLIC BLOOD PRESSURE: 70 MMHG | WEIGHT: 170.44 LBS | HEART RATE: 91 BPM

## 2017-12-12 DIAGNOSIS — Z11.59 ENCOUNTER FOR HEPATITIS C SCREENING TEST FOR LOW RISK PATIENT: ICD-10-CM

## 2017-12-12 DIAGNOSIS — R73.09 ELEVATED GLUCOSE: ICD-10-CM

## 2017-12-12 DIAGNOSIS — M47.816 SPONDYLOSIS OF LUMBAR REGION WITHOUT MYELOPATHY OR RADICULOPATHY: ICD-10-CM

## 2017-12-12 DIAGNOSIS — E03.9 HYPOTHYROIDISM, UNSPECIFIED TYPE: ICD-10-CM

## 2017-12-12 DIAGNOSIS — Z12.11 SCREEN FOR COLON CANCER: ICD-10-CM

## 2017-12-12 DIAGNOSIS — F51.01 PRIMARY INSOMNIA: ICD-10-CM

## 2017-12-12 DIAGNOSIS — J30.9 CHRONIC ALLERGIC RHINITIS, UNSPECIFIED SEASONALITY, UNSPECIFIED TRIGGER: ICD-10-CM

## 2017-12-12 DIAGNOSIS — E78.5 HYPERLIPIDEMIA, UNSPECIFIED HYPERLIPIDEMIA TYPE: ICD-10-CM

## 2017-12-12 DIAGNOSIS — N40.0 BENIGN PROSTATIC HYPERPLASIA WITHOUT LOWER URINARY TRACT SYMPTOMS: ICD-10-CM

## 2017-12-12 DIAGNOSIS — I10 ESSENTIAL HYPERTENSION: Primary | ICD-10-CM

## 2017-12-12 DIAGNOSIS — K58.9 IRRITABLE BOWEL SYNDROME WITHOUT DIARRHEA: ICD-10-CM

## 2017-12-12 PROCEDURE — 99214 OFFICE O/P EST MOD 30 MIN: CPT | Mod: S$GLB,,, | Performed by: INTERNAL MEDICINE

## 2017-12-12 RX ORDER — MELOXICAM 15 MG/1
15 TABLET ORAL DAILY
Qty: 30 TABLET | Refills: 3 | Status: SHIPPED | OUTPATIENT
Start: 2017-12-12 | End: 2018-06-12

## 2017-12-12 RX ORDER — DOXEPIN HYDROCHLORIDE 150 MG/1
CAPSULE ORAL
Qty: 30 CAPSULE | Refills: 5 | Status: SHIPPED | OUTPATIENT
Start: 2017-12-12 | End: 2018-06-12 | Stop reason: SDUPTHER

## 2017-12-12 NOTE — PROGRESS NOTES
Subjective:       Patient ID: Eric Buitrago is a 72 y.o. male.    Current Outpatient Prescriptions   Medication Sig Dispense Refill    azelastine (ASTELIN) 137 mcg (0.1 %) nasal spray PLACE 1 SPRAY INTO EACH NOSTRIL TWICE DAILY 30 mL 11    doxepin (SINEQUAN) 150 MG Cap TAKE 1 CAPSULE BY MOUTH EVERY DAY 30 capsule 5    fexofenadine (ALLEGRA) 60 MG tablet Take 1 tablet by mouth.      levothyroxine (SYNTHROID) 137 MCG Tab tablet Take 1 tablet (137 mcg total) by mouth before breakfast. 30 tablet 11    lisinopril 10 MG tablet Take 1 tablet (10 mg total) by mouth once daily. 30 tablet 6    tamsulosin (FLOMAX) 0.4 mg Cp24 TAKE 1 CAPSULE(0.4 MG) BY MOUTH EVERY EVENING 90 capsule 3    temazepam (RESTORIL) 30 mg capsule GIVE " 1 CAPSULE BY MOUTH EVERY DAY AT BEDTIME 30 capsule 0    meloxicam (MOBIC) 15 MG tablet Take 1 tablet (15 mg total) by mouth once daily. 30 tablet 3    mometasone (NASONEX) 50 mcg/actuation nasal spray SPRAY TWICE IN BOTH NOSTRILS DAILY AS NEEDED 1 each 6     No current facility-administered medications for this visit.      Chief Complaint: Follow-up  He is here today to f/u on chronic medical issues.     He has hypothyroid that is controlled on levothyroxine 137 mcg qday. His last TSH was normal on 9/2017.      He has hypertension controlled on lisinopril 10 mg qday. He denies any CAD. He denies chest pain or shortness of breath.     He has mildly elevated lipids check in 9/2017 were 154/76/38/100. He is not on treatment. He does not take aspirin.     On review of his labs on 9/2017 he was noted to have elevated glucose to 135.       He has BPH which is controlled with flomax. He says this helps with his frequency and nocturia.      He has seasonal allergies controlled with allegra and astelin and nasonex. He does report increased congestion and sore throat over the last couple of days. No fevers or coughing. No sinus pressure.        He has IBS for many years. He was seen by GI about 10  "years ago and started on doxepin for spastic colon.  He says this has worked and he no longer has any symptoms.  He says he has had multiple colonoscopies in the past.       He has insomnia that is controlled with PRN temazepam for last 5 years. He takes about 1/2 tablet qweek  when he can not fall asleep. He says it works and denies side effects.       He occasionally has low back pain if he over exerts himself. He will take mobic with good relief.  He is pain free today.     He is very active and enjoys working in his yard. He goes to the gym 3 times a week for one hour and does treadmill and weight training. He does eat healthy.       Colonoscopy---more than 10 years ago  Tdap---9/2017  Influenza vaccine---11/2017  Pneumovax 23----none  Prevnar 13----6/2017  Shingles vaccine----- 10/2011     Review of Systems   Constitutional: Negative for appetite change, fatigue, fever and unexpected weight change.   HENT: Negative for congestion, ear pain, hearing loss, sore throat and trouble swallowing.    Eyes: Negative for pain and visual disturbance.   Respiratory: Negative for cough, chest tightness, shortness of breath and wheezing.    Cardiovascular: Negative for chest pain, palpitations and leg swelling.   Gastrointestinal: Negative for abdominal pain, blood in stool, constipation, diarrhea, nausea and vomiting.   Endocrine: Negative for polyuria.   Genitourinary: Negative for dysuria and hematuria.   Musculoskeletal: Positive for arthralgias (hands) and back pain. Negative for myalgias.   Skin: Negative for rash.   Neurological: Negative for dizziness, weakness, numbness and headaches.   Hematological: Does not bruise/bleed easily.   Psychiatric/Behavioral: Negative for dysphoric mood, sleep disturbance and suicidal ideas. The patient is not nervous/anxious.        Objective:      Vitals:    12/12/17 0847   BP: 118/70   Pulse: 91   Resp: 18   Temp: 97.7 °F (36.5 °C)   Weight: 77.3 kg (170 lb 6.7 oz)   Height: 5' 11" " (1.803 m)     Body mass index is 23.77 kg/m².  Physical Exam    General appearance: No acute distress, cooperative  Eyes: PERRL, EOMI, conjunctiva clear  Ears: normal external ear and pinna, tm clear without drainage, canals clear  Nose: Normal mucosa without drainage  Throat: no exudates or erythema, tonsils not enlarged  Mouth: no sores or lesions, moist mucous membranes, good dentition  Neck: FROM, soft, supple, no thyromegaly, no bruits  Lymph: no anterior or posterior cervical adenopathy  Heart::  Regular rate and rhythm, no murmur  Lung: Clear to ascultation bilaterally, no wheezing, no rales, no rhonchi, no distress  Abdomen: Soft, nontender, no distention, no hepatosplenomegaly, bowel sounds normal, no guarding, no rebound, no peritoneal signs  Skin: no rashes, no lesions  Extremities: no edema, no cyanosis  Neuro: CN 2-12 intact, 5/5 muscle strength upper and lower extremity bilaterally, 2+ DTRs UE and LE bilaterally, normal gait, normal sensation  Peripheral pulses: 2+ pedal pulses bilaterally, good perfusion and color  Musculoskeletal: FROM, good strenth, no tenderness  Joint: normal appearance, no swelling, no warmth, no deformity in all joints    Assessment:       1. Essential hypertension    2. Hyperlipidemia, unspecified hyperlipidemia type    3. Primary insomnia    4. Hypothyroidism, unspecified type    5. Elevated glucose    6. Irritable bowel syndrome without diarrhea    7. Chronic allergic rhinitis, unspecified seasonality, unspecified trigger    8. Benign prostatic hyperplasia without lower urinary tract symptoms    9. Spondylosis of lumbar region without myelopathy or radiculopathy    10. Encounter for hepatitis C screening test for low risk patient    11. Screen for colon cancer        Plan:       Essential hypertension  Good control on this regimen.    Hyperlipidemia, unspecified hyperlipidemia type  LDL at 100 without treatment. Discuss good dietary changes to help him get to goal.       Primary insomnia  Controlled on doxepin and temazepam PRN. He is not overusing temazepam and no refill given today.   -     doxepin (SINEQUAN) 150 MG Cap; TAKE 1 CAPSULE BY MOUTH EVERY DAY  Dispense: 30 capsule; Refill: 5    Hypothyroidism, unspecified type  Good control on this dose of levothyroxine.     Elevated glucose  Noted on 2 occasions. Will check HbA1c today.   -     Hemoglobin A1c; Future; Expected date: 12/12/2017    Irritable bowel syndrome without diarrhea  Controlled on doxepin nightly.     Chronic allergic rhinitis, unspecified seasonality, unspecified trigger  Stable on astelin and nasonex. He uses allergra PRN.     Benign prostatic hyperplasia without lower urinary tract symptoms  Stable and no active symptoms.     Spondylosis of lumbar region without myelopathy or radiculopathy  Mild LBP with intermittent flares that responds well to mobic.  No pain today.   -     meloxicam (MOBIC) 15 MG tablet; Take 1 tablet (15 mg total) by mouth once daily.  Dispense: 30 tablet; Refill: 3    Encounter for hepatitis C screening test for low risk patient  -     Hepatitis C antibody; Future; Expected date: 12/12/2017    Screen for colon cancer  -     Case request GI: COLONOSCOPY    Return in about 6 months (around 6/12/2018) for chronic medical issues and pneumovax.

## 2017-12-14 ENCOUNTER — LAB VISIT (OUTPATIENT)
Dept: LAB | Facility: HOSPITAL | Age: 72
End: 2017-12-14
Attending: INTERNAL MEDICINE
Payer: MEDICARE

## 2017-12-14 DIAGNOSIS — R73.09 ELEVATED GLUCOSE: ICD-10-CM

## 2017-12-14 DIAGNOSIS — Z11.59 ENCOUNTER FOR HEPATITIS C SCREENING TEST FOR LOW RISK PATIENT: ICD-10-CM

## 2017-12-14 LAB
ESTIMATED AVG GLUCOSE: 82 MG/DL
HBA1C MFR BLD HPLC: 4.5 %

## 2017-12-14 PROCEDURE — 36415 COLL VENOUS BLD VENIPUNCTURE: CPT | Mod: PO

## 2017-12-14 PROCEDURE — 83036 HEMOGLOBIN GLYCOSYLATED A1C: CPT

## 2017-12-14 PROCEDURE — 86803 HEPATITIS C AB TEST: CPT

## 2017-12-15 ENCOUNTER — TELEPHONE (OUTPATIENT)
Dept: FAMILY MEDICINE | Facility: CLINIC | Age: 72
End: 2017-12-15

## 2017-12-15 LAB — HCV AB SERPL QL IA: NEGATIVE

## 2017-12-18 ENCOUNTER — TELEPHONE (OUTPATIENT)
Dept: FAMILY MEDICINE | Facility: CLINIC | Age: 72
End: 2017-12-18

## 2017-12-18 NOTE — TELEPHONE ENCOUNTER
----- Message from Haroldo Jacobs sent at 12/18/2017  4:11 PM CST -----  Contact: patient  Patient called requesting lab results. Please call back at [phone]. Thanks,

## 2017-12-27 RX ORDER — LISINOPRIL 10 MG/1
TABLET ORAL
Qty: 90 TABLET | Refills: 3 | Status: SHIPPED | OUTPATIENT
Start: 2017-12-27 | End: 2018-12-12 | Stop reason: SDUPTHER

## 2018-02-20 NOTE — H&P
History & Physical - Short Stay  Gastroenterology      SUBJECTIVE:     Procedure: Colonoscopy    Chief Complaint/Indication for Procedure: Screening    History of Present Illness:  Asymptomatic  Office Visit     6/6/2017  [facility]   Maddi Gastelum,    Internal Medicine   Hypothyroidism, unspecified type +12 more   Dx   Establish Care   Reason for Visit    Progress Notes       Subjective:       Patient ID: Eric Buitrago is a 71 y.o. male.    Chief Complaint: Establish Care  He is here today to establish care. He is a former patient of Dr. Scott.      He has hypothyroid diagnosed about one year ago. He was taking levothyroxine 100 mcg but his TSH on 4/2017 was 6.03 and his levothyroxine was increased to 125 mcg qday. He has been taking this dose for 3 weeks and has not had a TSH rechecked.      He has hypertension controlled on lisinopril 10 mg qday. He denies any CAD. He denies chest pain or shortness of breath.     He has mildly elevated lipids check in 2015 were 178/104/39/118. He is not on treatment. He does not take aspirin.      He has BPH which is controlled with flomax. He says this helps with his frequency and nocturia.      He has seasonal allergies controlled with allegra and astelin. He denies any active symptoms.       He has IBS for many years. He was seen by GI about 10 years ago and started on doxepin for spastic colon.  He says this has worked and he no longer has any symptoms.  He says he has had multiple colonoscopies in the past.       He has insomnia that is controlled with PRN temazepam for last 5 years. He takes one tablet every 3-4 nights when he can not fall asleep. He says it works and denies side effects.       He occasionally has low back pain if he over exerts himself. He will take mobic with good relief.       Colonoscopy---more than 10 years ago  Tdap---sanya than 10 years  Influenza vaccine---11/2016  Pneumovax 23----none  Prevnar 13----none  Shingles  vaccine----- 10/2011     Assessment:       1. Hypothyroidism, unspecified type    2. Essential hypertension    3. Hyperlipidemia, unspecified hyperlipidemia type    4. Benign prostatic hyperplasia, presence of lower urinary tract symptoms unspecified, unspecified morphology    5. Irritable bowel syndrome without diarrhea    6. Primary insomnia    7. Spondylosis of lumbar region without myelopathy or radiculopathy    8. Chronic allergic rhinitis    9. Screening for AAA (abdominal aortic aneurysm)    10. Screening for prostate cancer    11. Screen for colon cancer    12. Need for Tdap vaccination    13. Need for vaccination for pneumococcus        Plan:       Hypothyroidism, unspecified type  Uncontrolled and dose increased 3 weeks ago. Will recheck TSH in 3 weeks on this new dose.   -     TSH; Future; Expected date: 06/06/2017     Essential hypertension  Good control on this regimen.   -     CBC auto differential; Future; Expected date: 06/06/2017  -     Comprehensive metabolic panel; Future; Expected date: 06/06/2017     Hyperlipidemia, unspecified hyperlipidemia type  Mildly increased LDL from 2015. Will recheck today and if elevated will recommend a statin.    -     Lipid panel; Future; Expected date: 06/06/2017     Benign prostatic hyperplasia, presence of lower urinary tract symptoms unspecified, unspecified morphology  Controlled on fomax.      Irritable bowel syndrome without diarrhea  Stable and no flares since starting doxepin many years ago.      Primary insomnia  Controlled on daily doxepin and PRN temazepam.  He is only taking temazepam every 3-4 days.   -     doxepin (SINEQUAN) 150 MG Cap; TAKE 1 CAPSULE BY MOUTH EVERY DAY  Dispense: 30 capsule; Refill: 5     Spondylosis of lumbar region without myelopathy or radiculopathy  Only with intermittent flare relieved with mobic.      Chronic allergic rhinitis  Stable and well controlled.       Screening for AAA (abdominal aortic aneurysm)  -     US Abdominal  Aorta; Future; Expected date: 06/06/2017     Screening for prostate cancer  -     PSA, Screening; Future; Expected date: 06/06/2017     Screen for colon cancer  -     Case request GI: COLONOSCOPY     Need for Tdap vaccination  -     diphth,pertus,acell,,tetanus (BOOSTRIX TDAP) 2.5-8-5 Lf-mcg-Lf/0.5mL Syrg injection; Inject 0.5 mLs into the muscle once.  Dispense: 0.5 mL; Refill: 0     Need for vaccination for pneumococcus  -     Pneumococcal Conjugate Vaccine (13 Valent) (IM)     Return in about 6 months (around 12/6/2017) for chronic medical issues.                PTA Medications   Medication Sig    azelastine (ASTELIN) 137 mcg (0.1 %) nasal spray PLACE 1 SPRAY INTO EACH NOSTRIL TWICE DAILY    doxepin (SINEQUAN) 150 MG Cap TAKE 1 CAPSULE BY MOUTH EVERY DAY    fexofenadine (ALLEGRA) 60 MG tablet Take 1 tablet by mouth.    levothyroxine (SYNTHROID) 137 MCG Tab tablet Take 1 tablet (137 mcg total) by mouth before breakfast.    lisinopril 10 MG tablet TAKE 1 TABLET(10 MG) BY MOUTH EVERY DAY    meloxicam (MOBIC) 15 MG tablet Take 1 tablet (15 mg total) by mouth once daily.    tamsulosin (FLOMAX) 0.4 mg Cp24 TAKE 1 CAPSULE(0.4 MG) BY MOUTH EVERY EVENING    temazepam (RESTORIL) 30 mg capsule GIVE " 1 CAPSULE BY MOUTH EVERY DAY AT BEDTIME       Review of patient's allergies indicates:   Allergen Reactions    No known drug allergies         Past Medical History:   Diagnosis Date    Allergy     Hyperlipidemia     Hypertension     Hypothyroid     IBS (irritable bowel syndrome)      Past Surgical History:   Procedure Laterality Date    CHOLECYSTECTOMY  2007    COLONOSCOPY  ~2007    [facility].     Family History   Problem Relation Age of Onset    Lymphoma Mother     Stroke Father     Breast cancer Sister     Leukemia Brother      Social History   Substance Use Topics    Smoking status: Former Smoker     Quit date: 7/9/1983    Smokeless tobacco: Never Used    Alcohol use Yes      Comment: occ  "        OBJECTIVE:     Vital Signs (Most Recent)  Temp: 97.7 °F (36.5 °C) (02/21/18 1102)  Pulse: 94 (02/21/18 1102)  Resp: 18 (02/21/18 1102)  BP: (!) 146/79 (02/21/18 1102)  SpO2: 98 % (02/21/18 1102)    Physical Exam:         :  Ht 5' 11" (1.803 m)    Wt 78.9 kg (173 lb 15.1 oz)    BMI 24.26 kg/m²                                                    GENERAL:  Comfortable, in no acute distress.                                 HEENT EXAM:  Nonicteric.  No adenopathy.  Oropharynx is clear.               NECK:  Supple.                                                               LUNGS:  Clear.                                                               CARDIAC:  Regular rate and rhythm.  S1, S2.  No murmur.                      ABDOMEN:  Soft, positive bowel sounds, nontender.  No hepatosplenomegaly or masses.  No rebound or guarding.            EXTREMITIES:  No edema.     MENTAL STATUS:  Alert and oriented.    ASSESSMENT/PLAN:     Assessment: Colorectal cancer screening    Plan: Colonoscopy    Anesthesia Plan:   MAC / General Anaesthesia    ASA Grade: ASA 2 - Patient with mild systemic disease with no functional limitations    MALLAMPATI SCORE: I (soft palate, uvula, fauces, and tonsillar pillars visible)    "

## 2018-02-21 ENCOUNTER — SURGERY (OUTPATIENT)
Age: 73
End: 2018-02-21

## 2018-02-21 ENCOUNTER — ANESTHESIA EVENT (OUTPATIENT)
Dept: ENDOSCOPY | Facility: HOSPITAL | Age: 73
End: 2018-02-21
Payer: MEDICARE

## 2018-02-21 ENCOUNTER — ANESTHESIA (OUTPATIENT)
Dept: ENDOSCOPY | Facility: HOSPITAL | Age: 73
End: 2018-02-21
Payer: MEDICARE

## 2018-02-21 ENCOUNTER — HOSPITAL ENCOUNTER (OUTPATIENT)
Facility: HOSPITAL | Age: 73
Discharge: HOME | End: 2018-02-21
Attending: INTERNAL MEDICINE | Admitting: INTERNAL MEDICINE
Payer: MEDICARE

## 2018-02-21 VITALS
OXYGEN SATURATION: 99 % | DIASTOLIC BLOOD PRESSURE: 72 MMHG | HEART RATE: 77 BPM | RESPIRATION RATE: 20 BRPM | BODY MASS INDEX: 24.5 KG/M2 | TEMPERATURE: 98 F | SYSTOLIC BLOOD PRESSURE: 128 MMHG | WEIGHT: 175 LBS | HEIGHT: 71 IN

## 2018-02-21 DIAGNOSIS — Z12.11 COLON CANCER SCREENING: ICD-10-CM

## 2018-02-21 PROCEDURE — 63600175 PHARM REV CODE 636 W HCPCS: Mod: PO | Performed by: NURSE ANESTHETIST, CERTIFIED REGISTERED

## 2018-02-21 PROCEDURE — 37000009 HC ANESTHESIA EA ADD 15 MINS: Mod: PO | Performed by: INTERNAL MEDICINE

## 2018-02-21 PROCEDURE — D9220A PRA ANESTHESIA: Mod: CRNA,,, | Performed by: NURSE ANESTHETIST, CERTIFIED REGISTERED

## 2018-02-21 PROCEDURE — 37000008 HC ANESTHESIA 1ST 15 MINUTES: Mod: PO | Performed by: INTERNAL MEDICINE

## 2018-02-21 PROCEDURE — G0121 COLON CA SCRN NOT HI RSK IND: HCPCS | Mod: PO | Performed by: INTERNAL MEDICINE

## 2018-02-21 PROCEDURE — G0121 COLON CA SCRN NOT HI RSK IND: HCPCS | Mod: ,,, | Performed by: INTERNAL MEDICINE

## 2018-02-21 PROCEDURE — D9220A PRA ANESTHESIA: Mod: ANES,,, | Performed by: ANESTHESIOLOGY

## 2018-02-21 RX ORDER — PROPOFOL 10 MG/ML
VIAL (ML) INTRAVENOUS
Status: DISCONTINUED | OUTPATIENT
Start: 2018-02-21 | End: 2018-02-21

## 2018-02-21 RX ORDER — SODIUM CHLORIDE, SODIUM LACTATE, POTASSIUM CHLORIDE, CALCIUM CHLORIDE 600; 310; 30; 20 MG/100ML; MG/100ML; MG/100ML; MG/100ML
INJECTION, SOLUTION INTRAVENOUS CONTINUOUS
Status: DISCONTINUED | OUTPATIENT
Start: 2018-02-21 | End: 2018-02-21 | Stop reason: HOSPADM

## 2018-02-21 RX ORDER — TEMAZEPAM 30 MG/1
CAPSULE ORAL
Qty: 30 CAPSULE | Refills: 0 | Status: SHIPPED | OUTPATIENT
Start: 2018-02-21 | End: 2018-05-17 | Stop reason: SDUPTHER

## 2018-02-21 RX ORDER — LIDOCAINE HCL/PF 100 MG/5ML
SYRINGE (ML) INTRAVENOUS
Status: DISCONTINUED | OUTPATIENT
Start: 2018-02-21 | End: 2018-02-21

## 2018-02-21 RX ADMIN — LIDOCAINE HYDROCHLORIDE 75 MG: 20 INJECTION, SOLUTION INTRAVENOUS at 11:02

## 2018-02-21 RX ADMIN — PROPOFOL 30 MG: 10 INJECTION, EMULSION INTRAVENOUS at 12:02

## 2018-02-21 RX ADMIN — SODIUM CHLORIDE, SODIUM LACTATE, POTASSIUM CHLORIDE, CALCIUM CHLORIDE: 600; 310; 30; 20 INJECTION, SOLUTION INTRAVENOUS at 11:02

## 2018-02-21 RX ADMIN — PROPOFOL 120 MG: 10 INJECTION, EMULSION INTRAVENOUS at 11:02

## 2018-02-21 RX ADMIN — PROPOFOL 30 MG: 10 INJECTION, EMULSION INTRAVENOUS at 11:02

## 2018-02-21 NOTE — BRIEF OP NOTE
Discharge Note  Short Stay      SUMMARY     Admit Date: 2/21/2018    Attending Physician: Josue yMles Jr., MD     Discharge Physician: Josue Myles Jr., MD    Discharge Date: 2/21/2018 12:32 PM    Final Diagnosis: Screen for colon cancer [Z12.11]    Diverticulosis.  Spasms c/w IBS.    Disposition: HOME OR SELF CARE    Patient Instructions:   Current Discharge Medication List      CONTINUE these medications which have NOT CHANGED    Details   azelastine (ASTELIN) 137 mcg (0.1 %) nasal spray PLACE 1 SPRAY INTO EACH NOSTRIL TWICE DAILY  Qty: 30 mL, Refills: 11    Associated Diagnoses: Upper respiratory tract infection, unspecified type; Other acute sinusitis, recurrence not specified      doxepin (SINEQUAN) 150 MG Cap TAKE 1 CAPSULE BY MOUTH EVERY DAY  Qty: 30 capsule, Refills: 5    Associated Diagnoses: Primary insomnia      fexofenadine (ALLEGRA) 60 MG tablet Take 1 tablet by mouth.      levothyroxine (SYNTHROID) 137 MCG Tab tablet Take 1 tablet (137 mcg total) by mouth before breakfast.  Qty: 30 tablet, Refills: 11      lisinopril 10 MG tablet TAKE 1 TABLET(10 MG) BY MOUTH EVERY DAY  Qty: 90 tablet, Refills: 3      meloxicam (MOBIC) 15 MG tablet Take 1 tablet (15 mg total) by mouth once daily.  Qty: 30 tablet, Refills: 3    Associated Diagnoses: Spondylosis of lumbar region without myelopathy or radiculopathy      tamsulosin (FLOMAX) 0.4 mg Cp24 TAKE 1 CAPSULE(0.4 MG) BY MOUTH EVERY EVENING  Qty: 90 capsule, Refills: 3    Associated Diagnoses: Benign non-nodular prostatic hyperplasia without lower urinary tract symptoms      temazepam (RESTORIL) 30 mg capsule GIVE " 1 CAPSULE BY MOUTH EVERY DAY AT BEDTIME  Qty: 30 capsule, Refills: 0             Discharge Procedure Orders (must include Diet, Follow-up, Activity)    Follow Up:  Follow up with PCP as per your routine.  Please follow a high fiber diet.  Activity as tolerated.    No driving day of procedure.    PROBIOTICS:  Now that your colon is so cleaned  out, now is a good time for a round of PROBIOTICS.  Eat a container of Greek Yogurt, such as OIKOS or CHOBANI,  Or Activia or Dannon    Greek Yogurt.    Or Take a similar Probiotic product such as Align or Culturelle or Nannette-Q, every day for a month.                  (The products listed are non-prescription, but you may need to ask the pharmacist for their location.)  Repeat this 3-4 times a year.

## 2018-02-21 NOTE — ANESTHESIA POSTPROCEDURE EVALUATION
"Anesthesia Post Evaluation    Patient: Eric Buitrago    Procedure(s) Performed: Procedure(s) (LRB):  COLONOSCOPY (N/A)    Final Anesthesia Type: general  Patient location during evaluation: PACU  Patient participation: Yes- Able to Participate  Level of consciousness: awake and alert and oriented  Post-procedure vital signs: reviewed and stable  Pain management: adequate  Airway patency: patent  PONV status at discharge: No PONV  Anesthetic complications: no      Cardiovascular status: blood pressure returned to baseline and stable  Respiratory status: unassisted and spontaneous ventilation  Hydration status: euvolemic  Follow-up not needed.        Visit Vitals  /72   Pulse 77   Temp 36.5 °C (97.7 °F) (Skin)   Resp 20   Ht 5' 11" (1.803 m)   Wt 79.4 kg (175 lb)   SpO2 99%   BMI 24.41 kg/m²       Pain/Noy Score: Pain Assessment Performed: Yes (2/21/2018 12:54 PM)  Presence of Pain: denies (2/21/2018 12:54 PM)  Noy Score: 10 (2/21/2018 12:57 PM)      "

## 2018-02-21 NOTE — ANESTHESIA PREPROCEDURE EVALUATION
02/21/2018  Eric Buitrago is a 72 y.o., male.    Anesthesia Evaluation    I have reviewed the Patient Summary Reports.    I have reviewed the Nursing Notes.   I have reviewed the Medications.     Review of Systems  Anesthesia Hx:  No problems with previous Anesthesia    Social:  Non-Smoker    Cardiovascular:   Hypertension, well controlled    Pulmonary:  Pulmonary Normal    Renal/:  Renal/ Normal     Musculoskeletal:   Arthritis     Neurological:  Neurology Normal    Endocrine:  Endocrine Normal        Physical Exam  General:  Well nourished    Airway/Jaw/Neck:  Airway Findings: Mouth Opening: Normal Tongue: Normal  General Airway Assessment: Adult  Oropharynx Findings:  Mallampati: II  Jaw/Neck Findings:  Neck ROM: Normal ROM     Eyes/Ears/Nose:  Eyes/Ears/Nose Findings:    Dental:  Dental Findings:   Chest/Lungs:  Chest/Lungs Findings: Normal Respiratory Rate     Heart/Vascular:  Heart Findings: Rate: Normal  Rhythm: Regular Rhythm        Mental Status:  Mental Status Findings:  Cooperative, Alert and Oriented         Anesthesia Plan  Type of Anesthesia, risks & benefits discussed:  Anesthesia Type:  general  Patient's Preference:   Intra-op Monitoring Plan:   Intra-op Monitoring Plan Comments:   Post Op Pain Control Plan: multimodal analgesia  Post Op Pain Control Plan Comments:   Induction:   IV  Beta Blocker:  Patient is not currently on a Beta-Blocker (No further documentation required).       Informed Consent: Patient understands risks and agrees with Anesthesia plan.  Questions answered. Anesthesia consent signed with patient.  ASA Score: 2     Day of Surgery Review of History & Physical:  There are no significant changes.   H&P completed by Anesthesiologist.       Ready For Surgery From Anesthesia Perspective.

## 2018-02-21 NOTE — TRANSFER OF CARE
"Anesthesia Transfer of Care Note    Patient: Eric Buitrago    Procedure(s) Performed: Procedure(s) (LRB):  COLONOSCOPY (N/A)    Patient location: PACU    Anesthesia Type: general    Transport from OR: Transported from OR on room air with adequate spontaneous ventilation    Post pain: adequate analgesia    Post assessment: no apparent anesthetic complications and tolerated procedure well    Post vital signs: stable    Level of consciousness: awake    Nausea/Vomiting: no nausea/vomiting    Complications: none    Transfer of care protocol was followed      Last vitals:   Visit Vitals  BP (!) 146/79 (BP Location: Right arm, Patient Position: Lying)   Pulse 94   Temp 36.5 °C (97.7 °F) (Skin)   Resp 18   Ht 5' 11" (1.803 m)   Wt 79.4 kg (175 lb)   SpO2 98%   BMI 24.41 kg/m²     "

## 2018-02-21 NOTE — DISCHARGE INSTRUCTIONS
Recovery After Procedural Sedation (Adult)  You have been given medicine by vein to make you sleep during your surgery. This may have included both a pain medicine and sleeping medicine. Most of the effects have worn off. But you may still have some drowsiness for the next 6 to 8 hours.  Home care  Follow these guidelines when you get home:  · For the next 8 hours, you should be watched by a responsible adult. This person should make sure your condition is not getting worse.  · Don't drink any alcohol for the next 24 hours.  · Don't drive, operate dangerous machinery, or make important business or personal decisions during the next 24 hours.  Note: Your healthcare provider may tell you not to take any medicine by mouth for pain or sleep in the next 4 hours. These medicines may react with the medicines you were given in the hospital. This could cause a much stronger response than usual.  Follow-up care  Follow up with your healthcare provider if you are not alert and back to your usual level of activity within 12 hours.  When to seek medical advice  Call your healthcare provider right away if any of these occur:  · Drowsiness gets worse  · Weakness or dizziness gets worse  · Repeated vomiting  · You can't be awakened   Date Last Reviewed: 10/18/2016  © 3948-1416 [employer]. [address] [place] [address], [place] [address]. All rights reserved. This information is not intended as a substitute for professional medical care. Always follow your healthcare professional's instructions.      PROBIOTICS:  Now that your colon is so cleaned out, now is a good time for a round of PROBIOTICS.  Eat a container of Greek Yogurt, such as OIKOS or CHOBANI,  Or Activia or Dannon    Greek Yogurt.    Or Take a similar Probiotic product such as Align or Culturelle or Nannette-Q, every day for a month.                  (The products listed are non-prescription, but you may need to ask the pharmacist for their location.)  Repeat  this 3-4 times a year.        High-Fiber Diet  Fiber is in fruits, vegetables, cereals, and grains. Fiber passes through your body undigested. A high-fiber diet helps food move through your intestinal tract. The added bulk is helpful in preventing constipation. In people with diverticulosis, fiber helps clean out the pouches along the colon wall. It also prevents new pouches from forming. A high-fiber diet reduces the risk of colon cancer. It also lowers blood cholesterol and prevents high blood sugar in people with diabetes.    The fiber-rich foods listed below should be part of your diet. If you are not used to high-fiber foods, start with 1 or 2 foods from this list. Every 3 to 4 days add a new one to your diet. Do this until you are eating 4 high-fiber foods per day. This should give you 20 to 35 grams of fiber a day. It is also important to drink a lot of water when you are on this diet. You should have 6 to 8 glasses of water a day. Water makes the fiber swell and increases the benefit.  Foods high in dietary fiber  The following foods are high in dietary fiber:  · Breads. Breads made with 100% whole-wheat flour; mike, wheat, or rye crackers; whole-grain tortillas, bran muffins.  · Cereals. Whole-grain and bran cereals with bran (shredded wheat, wheat flakes, raisin bran, corn bran); oatmeal, rolled oats, granola, and brown rice.  · Fruits. Fresh fruits and their edible skins (pears, prunes, raisins, berries, apples, and apricots); bananas, citrus fruit, mangoes, pineapple; and prune juice.  · Nuts. Any nuts and seeds.  · Vegetables. Best served raw or lightly cooked. All types, especially: green peas, celery, eggplant, potatoes, spinach, broccoli, [place] sprouts, winter squash, carrots, cauliflower, soybeans, lentils, and fresh and dried beans of all kinds.  · Other. Popcorn, any spices.  Date Last Reviewed: 8/1/2016  © 6505-1506 "[employer]. [address] [place] [address], [place] [address]. All  rights reserved. This information is not intended as a substitute for professional medical care. Always follow your healthcare professional's instructions.

## 2018-03-26 ENCOUNTER — TELEPHONE (OUTPATIENT)
Dept: GASTROENTEROLOGY | Facility: CLINIC | Age: 73
End: 2018-03-26

## 2018-03-26 NOTE — TELEPHONE ENCOUNTER
----- Message from RT Michael sent at 3/26/2018  9:04 AM CDT -----  Contact: Melani,[phone], [facility]  Melani,[phone], [facility], requesting a all back soon to confirm if biopsy's were taken during time of the pt colonoscopy appt of 02/21//2018, thanks.

## 2018-05-17 RX ORDER — TEMAZEPAM 30 MG/1
CAPSULE ORAL
Qty: 30 CAPSULE | Refills: 0 | OUTPATIENT
Start: 2018-05-17

## 2018-05-17 RX ORDER — TEMAZEPAM 30 MG/1
30 CAPSULE ORAL NIGHTLY
Qty: 30 CAPSULE | Refills: 0 | Status: SHIPPED | OUTPATIENT
Start: 2018-05-17 | End: 2018-08-15 | Stop reason: SDUPTHER

## 2018-06-12 ENCOUNTER — OFFICE VISIT (OUTPATIENT)
Dept: FAMILY MEDICINE | Facility: CLINIC | Age: 73
End: 2018-06-12
Payer: MEDICARE

## 2018-06-12 VITALS
HEIGHT: 71 IN | TEMPERATURE: 98 F | OXYGEN SATURATION: 97 % | DIASTOLIC BLOOD PRESSURE: 72 MMHG | RESPIRATION RATE: 18 BRPM | SYSTOLIC BLOOD PRESSURE: 128 MMHG | BODY MASS INDEX: 24.59 KG/M2 | WEIGHT: 175.63 LBS | HEART RATE: 72 BPM

## 2018-06-12 DIAGNOSIS — Z23 NEED FOR SHINGLES VACCINE: ICD-10-CM

## 2018-06-12 DIAGNOSIS — Z23 NEED FOR PROPHYLACTIC VACCINATION AGAINST STREPTOCOCCUS PNEUMONIAE (PNEUMOCOCCUS): ICD-10-CM

## 2018-06-12 DIAGNOSIS — K58.9 IRRITABLE BOWEL SYNDROME, UNSPECIFIED TYPE: ICD-10-CM

## 2018-06-12 DIAGNOSIS — M47.816 SPONDYLOSIS OF LUMBAR REGION WITHOUT MYELOPATHY OR RADICULOPATHY: ICD-10-CM

## 2018-06-12 DIAGNOSIS — M19.042 PRIMARY OSTEOARTHRITIS OF BOTH HANDS: ICD-10-CM

## 2018-06-12 DIAGNOSIS — E03.9 HYPOTHYROIDISM, UNSPECIFIED TYPE: ICD-10-CM

## 2018-06-12 DIAGNOSIS — M19.041 PRIMARY OSTEOARTHRITIS OF BOTH HANDS: ICD-10-CM

## 2018-06-12 DIAGNOSIS — I10 ESSENTIAL HYPERTENSION: Primary | ICD-10-CM

## 2018-06-12 DIAGNOSIS — E78.5 HYPERLIPIDEMIA, UNSPECIFIED HYPERLIPIDEMIA TYPE: ICD-10-CM

## 2018-06-12 DIAGNOSIS — F51.01 PRIMARY INSOMNIA: ICD-10-CM

## 2018-06-12 DIAGNOSIS — J30.9 CHRONIC ALLERGIC RHINITIS: ICD-10-CM

## 2018-06-12 PROCEDURE — G0009 ADMIN PNEUMOCOCCAL VACCINE: HCPCS | Mod: S$GLB,,, | Performed by: INTERNAL MEDICINE

## 2018-06-12 PROCEDURE — 99214 OFFICE O/P EST MOD 30 MIN: CPT | Mod: S$GLB,,, | Performed by: INTERNAL MEDICINE

## 2018-06-12 PROCEDURE — 90732 PPSV23 VACC 2 YRS+ SUBQ/IM: CPT | Mod: S$GLB,,, | Performed by: INTERNAL MEDICINE

## 2018-06-12 RX ORDER — DOXEPIN HYDROCHLORIDE 150 MG/1
CAPSULE ORAL
Qty: 90 CAPSULE | Refills: 1 | Status: SHIPPED | OUTPATIENT
Start: 2018-06-12 | End: 2018-12-07 | Stop reason: SDUPTHER

## 2018-06-12 NOTE — PROGRESS NOTES
Subjective:       Patient ID: Eric Buitrago is a 72 y.o. male.    Medication List with Changes/Refills   New Medications    VARICELLA-ZOSTER GE-AS01B, PF, (SHINGRIX, PF,) 50 MCG/0.5 ML INJECTION    Inject 0.5 mLs into the muscle once.   Current Medications    AZELASTINE (ASTELIN) 137 MCG (0.1 %) NASAL SPRAY    PLACE 1 SPRAY INTO EACH NOSTRIL TWICE DAILY    FEXOFENADINE (ALLEGRA) 60 MG TABLET    Take 1 tablet by mouth.    LEVOTHYROXINE (SYNTHROID) 137 MCG TAB TABLET    Take 1 tablet (137 mcg total) by mouth before breakfast.    LISINOPRIL 10 MG TABLET    TAKE 1 TABLET(10 MG) BY MOUTH EVERY DAY    TAMSULOSIN (FLOMAX) 0.4 MG CP24    TAKE 1 CAPSULE(0.4 MG) BY MOUTH EVERY EVENING    TEMAZEPAM (RESTORIL) 30 MG CAPSULE    Take 1 capsule (30 mg total) by mouth nightly.   Changed and/or Refilled Medications    Modified Medication Previous Medication    DOXEPIN (SINEQUAN) 150 MG CAP doxepin (SINEQUAN) 150 MG Cap       TAKE 1 CAPSULE BY MOUTH EVERY DAY    TAKE 1 CAPSULE BY MOUTH EVERY DAY   Discontinued Medications    MELOXICAM (MOBIC) 15 MG TABLET    Take 1 tablet (15 mg total) by mouth once daily.       Chief Complaint: 6 month follow up; Medication Refill, doxepin; and Shingles Vaccine  He is here today to f/u on chronic medical issues.      He has hypothyroid that is controlled on levothyroxine 137 mcg qday. His last TSH was normal on 12/2017.        He has hypertension controlled on lisinopril 10 mg qday. He denies any CAD. He denies chest pain or shortness of breath.     He has mildly elevated lipids check in 6/2017 were 154/76/38/100. He is not on treatment. He does not take aspirin.      On review of his labs on 9/2017 he was noted to have elevated glucose to 135.  His subsequent HbA1c was 4.5 on 12/2017.       He has BPH which is controlled with flomax. He says this helps with his frequency and nocturia.      He has seasonal allergies controlled with allegra and astelin which he takes daily.  Since starting to  take daily he has no congestion and has not had sinus infections.         He has IBS for many years. He was seen by GI about 10 years ago and started on doxepin for spastic colon.  He says this has worked and he no longer has any symptoms.  His most recent colonoscopy on 2/2018 had no polyps but did show mild colonic spasms.      He has insomnia that is controlled with PRN temazepam for last 5 years. He takes about 1/2 tablet 2-3 nights a week. He says it works and denies side effects.       He occasionally has low back pain if he over exerts himself. He will take mobic with good relief.  He is pain free today. Pain worse with bending or lifting.  Better with stretching and rest.       He is very active and enjoys working in his yard. He goes to the gym 3 times a week for one hour and does treadmill and weight training. He does eat healthy.       Colonoscopy---2/2018 repeat in 10 years  Tdap---9/2017  Influenza vaccine---11/2017  Pneumovax 23----none  Prevnar 13----6/2017  Shingles vaccine----- 10/2011      Review of Systems   Constitutional: Negative for appetite change, fatigue, fever and unexpected weight change.   HENT: Negative for congestion, ear pain, hearing loss, sore throat and trouble swallowing.    Eyes: Negative for pain and visual disturbance.   Respiratory: Negative for cough, chest tightness, shortness of breath and wheezing.    Cardiovascular: Negative for chest pain, palpitations and leg swelling.   Gastrointestinal: Negative for abdominal pain, blood in stool, constipation, diarrhea, nausea and vomiting.   Endocrine: Negative for polyuria.   Genitourinary: Negative for dysuria and hematuria.   Musculoskeletal: Positive for arthralgias (hands) and back pain. Negative for myalgias.   Skin: Negative for rash.   Allergic/Immunologic: Positive for environmental allergies.   Neurological: Negative for dizziness, weakness, numbness and headaches.   Hematological: Does not bruise/bleed easily.  "  Psychiatric/Behavioral: Positive for sleep disturbance. Negative for dysphoric mood and suicidal ideas. The patient is not nervous/anxious.        Objective:      Vitals:    06/12/18 1027   BP: 128/72   BP Location: Right arm   Patient Position: Sitting   BP Method: Large (Manual)   Pulse: 72   Resp: 18   Temp: 98.4 °F (36.9 °C)   TempSrc: Oral   SpO2: 97%   Weight: 79.7 kg (175 lb 9.6 oz)   Height: 5' 11" (1.803 m)     Body mass index is 24.49 kg/m².  Physical Exam    General appearance: No acute distress, cooperative  Eyes: PERRL, EOMI, conjunctiva clear  Ears: normal external ear and pinna, tm clear without drainage, canals clear  Nose: Normal mucosa without drainage  Throat: no exudates or erythema, tonsils not enlarged  Mouth: no sores or lesions, moist mucous membranes, good dentition  Neck: FROM, soft, supple, no thyromegaly, no bruits  Lymph: no anterior or posterior cervical adenopathy  Heart::  Regular rate and rhythm, no murmur  Lung: Clear to ascultation bilaterally, no wheezing, no rales, no rhonchi, no distress  Abdomen: Soft, nontender, no distention, no hepatosplenomegaly, bowel sounds normal, no guarding, no rebound, no peritoneal signs  Skin: no rashes, no lesions  Extremities: no edema, no cyanosis  Neuro: CN 2-12 intact, 5/5 muscle strength upper and lower extremity bilaterally, 2+ DTRs UE and LE bilaterally, normal gait, normal sensation  Peripheral pulses: 2+ pedal pulses bilaterally, good perfusion and color  Musculoskeletal: FROM, good strenth, no tenderness  Joint: normal appearance, no swelling, no warmth, deformity at PIP and MCP of b/l hands with nodules    Assessment:       1. Essential hypertension    2. Hyperlipidemia, unspecified hyperlipidemia type    3. Hypothyroidism, unspecified type    4. Primary insomnia    5. Chronic allergic rhinitis    6. Primary osteoarthritis of both hands    7. Spondylosis of lumbar region without myelopathy or radiculopathy    8. Need for shingles " vaccine    9. Need for prophylactic vaccination against Streptococcus pneumoniae (pneumococcus)    10. Irritable bowel syndrome, unspecified type        Plan:       Essential hypertension  Good control on lisinopril. He is due to check labs.   -     Lipid panel; Future; Expected date: 06/12/2018  -     TSH; Future; Expected date: 06/12/2018  -     CBC auto differential; Future; Expected date: 06/12/2018  -     Comprehensive metabolic panel; Future; Expected date: 06/12/2018    Hyperlipidemia, unspecified hyperlipidemia type  Good control off medication. Will recheck today.     Hypothyroidism, unspecified type  Good control on this dose of levothyroxine.     Primary insomnia  Cotrolled on doxepin and temazepam as needed. No evidence of abuse by  and last fill was on 5/17/2018 #30 (fills about every 3 months).    -     doxepin (SINEQUAN) 150 MG Cap; TAKE 1 CAPSULE BY MOUTH EVERY DAY  Dispense: 90 capsule; Refill: 1    Spastic colon  Again seen on colonoscopy. He is doing well with doxepin for many years.     Chronic allergic rhinitis  Controlled and stable.     Primary osteoarthritis of both hands  Reassurance given that his changes seen on exam of hands due to OA. Okay to use meloxicam or tylenol for pain.     Spondylosis of lumbar region without myelopathy or radiculopathy  Controlled with regular exercise.     Need for shingles vaccine  -     varicella-zoster gE-AS01B, PF, (SHINGRIX, PF,) 50 mcg/0.5 mL injection; Inject 0.5 mLs into the muscle once.  Dispense: 0.5 mL; Refill: 1    Need for prophylactic vaccination against Streptococcus pneumoniae (pneumococcus)  -     Pneumococcal Polysaccharide Vaccine (23 Valent) (SQ/IM)    Follow-up in about 6 months (around 12/12/2018) for chronic medical issues.

## 2018-06-20 ENCOUNTER — LAB VISIT (OUTPATIENT)
Dept: LAB | Facility: HOSPITAL | Age: 73
End: 2018-06-20
Attending: INTERNAL MEDICINE
Payer: MEDICARE

## 2018-06-20 DIAGNOSIS — I10 ESSENTIAL HYPERTENSION: ICD-10-CM

## 2018-06-20 LAB
ALBUMIN SERPL BCP-MCNC: 3.9 G/DL
ALP SERPL-CCNC: 65 U/L
ALT SERPL W/O P-5'-P-CCNC: 21 U/L
ANION GAP SERPL CALC-SCNC: 7 MMOL/L
AST SERPL-CCNC: 18 U/L
BASOPHILS # BLD AUTO: 0.03 K/UL
BASOPHILS NFR BLD: 0.5 %
BILIRUB SERPL-MCNC: 0.8 MG/DL
BUN SERPL-MCNC: 24 MG/DL
CALCIUM SERPL-MCNC: 9 MG/DL
CHLORIDE SERPL-SCNC: 105 MMOL/L
CHOLEST SERPL-MCNC: 161 MG/DL
CHOLEST/HDLC SERPL: 4 {RATIO}
CO2 SERPL-SCNC: 29 MMOL/L
CREAT SERPL-MCNC: 0.9 MG/DL
DIFFERENTIAL METHOD: ABNORMAL
EOSINOPHIL # BLD AUTO: 0.4 K/UL
EOSINOPHIL NFR BLD: 5.7 %
ERYTHROCYTE [DISTWIDTH] IN BLOOD BY AUTOMATED COUNT: 13.4 %
EST. GFR  (AFRICAN AMERICAN): >60 ML/MIN/1.73 M^2
EST. GFR  (NON AFRICAN AMERICAN): >60 ML/MIN/1.73 M^2
GLUCOSE SERPL-MCNC: 86 MG/DL
HCT VFR BLD AUTO: 45.3 %
HDLC SERPL-MCNC: 40 MG/DL
HDLC SERPL: 24.8 %
HGB BLD-MCNC: 15.4 G/DL
IMM GRANULOCYTES # BLD AUTO: 0.02 K/UL
IMM GRANULOCYTES NFR BLD AUTO: 0.3 %
LDLC SERPL CALC-MCNC: 105.6 MG/DL
LYMPHOCYTES # BLD AUTO: 1.8 K/UL
LYMPHOCYTES NFR BLD: 27.9 %
MCH RBC QN AUTO: 31.5 PG
MCHC RBC AUTO-ENTMCNC: 34 G/DL
MCV RBC AUTO: 93 FL
MONOCYTES # BLD AUTO: 0.5 K/UL
MONOCYTES NFR BLD: 8.1 %
NEUTROPHILS # BLD AUTO: 3.6 K/UL
NEUTROPHILS NFR BLD: 57.5 %
NONHDLC SERPL-MCNC: 121 MG/DL
NRBC BLD-RTO: 0 /100 WBC
PLATELET # BLD AUTO: 227 K/UL
PMV BLD AUTO: 10.5 FL
POTASSIUM SERPL-SCNC: 4.8 MMOL/L
PROT SERPL-MCNC: 6.9 G/DL
RBC # BLD AUTO: 4.89 M/UL
SODIUM SERPL-SCNC: 141 MMOL/L
TRIGL SERPL-MCNC: 77 MG/DL
TSH SERPL DL<=0.005 MIU/L-ACNC: 1.97 UIU/ML
WBC # BLD AUTO: 6.3 K/UL

## 2018-06-20 PROCEDURE — 85025 COMPLETE CBC W/AUTO DIFF WBC: CPT

## 2018-06-20 PROCEDURE — 36415 COLL VENOUS BLD VENIPUNCTURE: CPT | Mod: PN

## 2018-06-20 PROCEDURE — 80053 COMPREHEN METABOLIC PANEL: CPT

## 2018-06-20 PROCEDURE — 80061 LIPID PANEL: CPT

## 2018-06-20 PROCEDURE — 84443 ASSAY THYROID STIM HORMONE: CPT

## 2018-06-21 ENCOUNTER — TELEPHONE (OUTPATIENT)
Dept: FAMILY MEDICINE | Facility: CLINIC | Age: 73
End: 2018-06-21

## 2018-06-21 NOTE — TELEPHONE ENCOUNTER
Please let him know that his labs look good. Normal liver,kidney and thyroid.  His cholesterol looks good.  Normal blood counts.     Thanks

## 2018-07-05 RX ORDER — LEVOTHYROXINE SODIUM 137 UG/1
TABLET ORAL
Qty: 90 TABLET | Refills: 3 | Status: SHIPPED | OUTPATIENT
Start: 2018-07-05 | End: 2019-07-25 | Stop reason: SDUPTHER

## 2018-08-15 RX ORDER — TEMAZEPAM 30 MG/1
CAPSULE ORAL
Qty: 30 CAPSULE | Refills: 0 | Status: SHIPPED | OUTPATIENT
Start: 2018-08-15 | End: 2018-11-14 | Stop reason: SDUPTHER

## 2018-10-01 DIAGNOSIS — M47.816 SPONDYLOSIS OF LUMBAR REGION WITHOUT MYELOPATHY OR RADICULOPATHY: ICD-10-CM

## 2018-10-01 RX ORDER — MELOXICAM 15 MG/1
TABLET ORAL
Qty: 90 TABLET | Refills: 3 | Status: SHIPPED | OUTPATIENT
Start: 2018-10-01 | End: 2019-09-23 | Stop reason: SDUPTHER

## 2018-10-22 DIAGNOSIS — J01.80 OTHER ACUTE SINUSITIS, RECURRENCE NOT SPECIFIED: ICD-10-CM

## 2018-10-22 DIAGNOSIS — J06.9 UPPER RESPIRATORY TRACT INFECTION, UNSPECIFIED TYPE: ICD-10-CM

## 2018-10-22 RX ORDER — AZELASTINE 1 MG/ML
SPRAY, METERED NASAL
Qty: 30 ML | Refills: 6 | Status: SHIPPED | OUTPATIENT
Start: 2018-10-22 | End: 2019-11-13 | Stop reason: SDUPTHER

## 2018-11-02 DIAGNOSIS — N40.0 BENIGN NON-NODULAR PROSTATIC HYPERPLASIA WITHOUT LOWER URINARY TRACT SYMPTOMS: ICD-10-CM

## 2018-11-02 RX ORDER — TAMSULOSIN HYDROCHLORIDE 0.4 MG/1
CAPSULE ORAL
Qty: 90 CAPSULE | Refills: 3 | Status: SHIPPED | OUTPATIENT
Start: 2018-11-02 | End: 2019-09-11 | Stop reason: SDUPTHER

## 2018-11-15 RX ORDER — TEMAZEPAM 30 MG/1
CAPSULE ORAL
Qty: 30 CAPSULE | Refills: 0 | Status: SHIPPED | OUTPATIENT
Start: 2018-11-15 | End: 2019-02-18 | Stop reason: SDUPTHER

## 2018-11-23 NOTE — PROGRESS NOTES
Left VM and went over the checklist instructions for pt's nuclear stress for Wednesday 8/16/17 at 8:30.     Signatures   Electronically signed by : Christopher Mcnulty, ; Aug 15 2017  4:39PM CST     Pre-visit Health Maintenance Review

## 2018-12-07 DIAGNOSIS — F51.01 PRIMARY INSOMNIA: ICD-10-CM

## 2018-12-08 RX ORDER — DOXEPIN HYDROCHLORIDE 150 MG/1
CAPSULE ORAL
Qty: 90 CAPSULE | Refills: 2 | Status: SHIPPED | OUTPATIENT
Start: 2018-12-08 | End: 2019-09-03 | Stop reason: SDUPTHER

## 2018-12-12 ENCOUNTER — OFFICE VISIT (OUTPATIENT)
Dept: FAMILY MEDICINE | Facility: CLINIC | Age: 73
End: 2018-12-12
Payer: MEDICARE

## 2018-12-12 VITALS
SYSTOLIC BLOOD PRESSURE: 138 MMHG | HEART RATE: 100 BPM | BODY MASS INDEX: 25.09 KG/M2 | DIASTOLIC BLOOD PRESSURE: 70 MMHG | WEIGHT: 179.19 LBS | HEIGHT: 71 IN | TEMPERATURE: 98 F

## 2018-12-12 DIAGNOSIS — J30.9 CHRONIC ALLERGIC RHINITIS: ICD-10-CM

## 2018-12-12 DIAGNOSIS — M19.042 PRIMARY OSTEOARTHRITIS OF BOTH HANDS: ICD-10-CM

## 2018-12-12 DIAGNOSIS — F51.01 PRIMARY INSOMNIA: ICD-10-CM

## 2018-12-12 DIAGNOSIS — E78.5 HYPERLIPIDEMIA, UNSPECIFIED HYPERLIPIDEMIA TYPE: ICD-10-CM

## 2018-12-12 DIAGNOSIS — M47.816 SPONDYLOSIS OF LUMBAR REGION WITHOUT MYELOPATHY OR RADICULOPATHY: ICD-10-CM

## 2018-12-12 DIAGNOSIS — Z23 NEED FOR IMMUNIZATION AGAINST INFLUENZA: ICD-10-CM

## 2018-12-12 DIAGNOSIS — N40.0 BENIGN NON-NODULAR PROSTATIC HYPERPLASIA WITHOUT LOWER URINARY TRACT SYMPTOMS: ICD-10-CM

## 2018-12-12 DIAGNOSIS — I10 ESSENTIAL HYPERTENSION: Primary | ICD-10-CM

## 2018-12-12 DIAGNOSIS — E03.9 HYPOTHYROIDISM, UNSPECIFIED TYPE: ICD-10-CM

## 2018-12-12 DIAGNOSIS — M19.041 PRIMARY OSTEOARTHRITIS OF BOTH HANDS: ICD-10-CM

## 2018-12-12 DIAGNOSIS — Z12.5 SCREENING FOR PROSTATE CANCER: ICD-10-CM

## 2018-12-12 PROCEDURE — 99214 OFFICE O/P EST MOD 30 MIN: CPT | Mod: 25,S$GLB,, | Performed by: INTERNAL MEDICINE

## 2018-12-12 PROCEDURE — G0008 ADMIN INFLUENZA VIRUS VAC: HCPCS | Mod: S$GLB,,, | Performed by: INTERNAL MEDICINE

## 2018-12-12 PROCEDURE — 90662 IIV NO PRSV INCREASED AG IM: CPT | Mod: S$GLB,,, | Performed by: INTERNAL MEDICINE

## 2018-12-12 RX ORDER — LISINOPRIL 10 MG/1
TABLET ORAL
Qty: 90 TABLET | Refills: 3 | Status: SHIPPED | OUTPATIENT
Start: 2018-12-12 | End: 2019-06-12

## 2018-12-12 RX ORDER — ZOSTER VACCINE RECOMBINANT, ADJUVANTED 50 MCG/0.5
KIT INTRAMUSCULAR
Refills: 0 | COMMUNITY
Start: 2018-11-07 | End: 2019-06-12 | Stop reason: ALTCHOICE

## 2018-12-12 NOTE — PROGRESS NOTES
Subjective:       Patient ID: Eric Buitrago is a 73 y.o. male.       Medication List           Accurate as of 12/12/18 11:13 AM. If you have any questions, ask your nurse or doctor.               CONTINUE taking these medications    ALLEGRA 60 MG tablet  Generic drug:  fexofenadine     azelastine 137 mcg (0.1 %) nasal spray  Commonly known as:  ASTELIN  USE 1 SPRAY IN EACH NOSTRIL TWICE DAILY     doxepin 150 MG Cap  Commonly known as:  SINEQUAN  TAKE 1 CAPSULE BY MOUTH EVERY DAY     levothyroxine 137 MCG Tab tablet  Commonly known as:  SYNTHROID  TAKE 1 TABLET(137 MCG) BY MOUTH BEFORE BREAKFAST     lisinopril 10 MG tablet  TAKE 1 TABLET(10 MG) BY MOUTH EVERY DAY     meloxicam 15 MG tablet  Commonly known as:  MOBIC  TAKE 1 TABLET(15 MG) BY MOUTH EVERY DAY     SHINGRIX (PF) 50 mcg/0.5 mL injection  Generic drug:  varicella-zoster gE-AS01B (PF)     tamsulosin 0.4 mg Cap  Commonly known as:  FLOMAX  TAKE ONE CAPSULE BY MOUTH EVERY EVENING     temazepam 30 mg capsule  Commonly known as:  RESTORIL  TAKE 1 CAPSULE(30 MG) BY MOUTH EVERY NIGHT           Where to Get Your Medications      These medications were sent to [facility] [address] [place] [address] AT [address] [address]    Phone:  [phone]   · lisinopril 10 MG tablet         Chief Complaint: Follow-up  He is here today to f/u on chronic medical issues. He has no complaints today.      He has hypothyroid that is controlled on levothyroxine 137 mcg qday. His last TSH was normal on 6/2018.       He has hypertension controlled on lisinopril 10 mg qday. He denies any CAD. He denies chest pain or shortness of breath.     He has mildly elevated lipids check in 6/2018 were 161/77/40/105. He is not on treatment. He does not take aspirin.      He has BPH which is controlled with flomax. He says this helps with his frequency and nocturia.      He has seasonal allergies controlled with allegra and  astelin which he takes daily.  Since starting to take daily he has no congestion and has not had sinus infections.         He has IBS for many years. He was seen by GI about 10 years ago and started on doxepin for spastic colon.  He says this has worked and he no longer has any symptoms.  His most recent colonoscopy on 2/2018 had no polyps but did show mild colonic spasms.      He has insomnia that is controlled with PRN temazepam for last 5 years. He takes about 1/2 tablet 2-3 nights a week. He says it works and denies side effects.       He occasionally has low back pain if he over exerts himself. He will take mobic with good relief.  He is pain free today. Pain worse with bending or lifting.  Better with stretching and rest.       He is very active and enjoys working in his yard. He goes to the gym 3 times a week for one hour and does treadmill and weight training. He does eat healthy.       Colonoscopy---2/2018 repeat in 10 years  Tdap---9/2017  Influenza vaccine---due  Pneumovax 23----6/2018  Prevnar 13----6/2017  Shingles vaccine----- 10/2011, 11/2018 (shingrex)    Review of Systems   Constitutional: Negative for appetite change, fatigue, fever and unexpected weight change.   HENT: Negative for congestion, ear pain, hearing loss, sore throat and trouble swallowing.    Eyes: Negative for pain and visual disturbance.   Respiratory: Negative for cough, chest tightness, shortness of breath and wheezing.    Cardiovascular: Negative for chest pain, palpitations and leg swelling.   Gastrointestinal: Negative for abdominal pain, blood in stool, constipation, diarrhea, nausea and vomiting.   Endocrine: Negative for polyuria.   Genitourinary: Positive for frequency. Negative for dysuria and hematuria.   Musculoskeletal: Positive for back pain. Negative for arthralgias and myalgias.   Skin: Negative for rash.   Neurological: Negative for dizziness, weakness, numbness and headaches.   Hematological: Does not bruise/bleed  "easily.   Psychiatric/Behavioral: Positive for sleep disturbance. Negative for dysphoric mood and suicidal ideas. The patient is not nervous/anxious.        Objective:      Vitals:    12/12/18 1012   BP: 138/70   Pulse: 100   Temp: 97.7 °F (36.5 °C)   TempSrc: Oral   Weight: 81.3 kg (179 lb 3.2 oz)   Height: 5' 11" (1.803 m)     Body mass index is 24.99 kg/m².  Physical Exam    General appearance: No acute distress, cooperative  Eyes: PERRL, EOMI, conjunctiva clear  HEENT: ears normal, nose without rhinorrhea, normal turbinates,mouth no sores or lesions, throat no erythema or pus  Neck: FROM, soft, supple, no thyromegaly, no bruits  Lymph: no anterior or posterior cervical adenopathy  Heart::  Regular rate and rhythm, no murmur  Lung: Clear to ascultation bilaterally, no wheezing, no rales, no rhonchi, no distress  Abdomen: Soft, nontender, no distention, no hepatosplenomegaly, bowel sounds normal, no guarding, no rebound, no peritoneal signs  Skin: no rashes, no lesions  Extremities: no edema, no cyanosis  Neuro: no focal abnormalities, strength 5/5 b/l UE and LE, 2+ DTRs b/l UE and LE, normal gait  Peripheral pulses: 2+ pedal pulses bilaterally, good perfusion and color  Musculoskeletal: FROM, good strenth, no tenderness  Joint: deformity in his b/l hands with nodules at PIP, swelling at MCP and PIP, no erythema or warmth    Assessment:       1. Essential hypertension    2. Hyperlipidemia, unspecified hyperlipidemia type    3. Hypothyroidism, unspecified type    4. Primary insomnia    5. Benign non-nodular prostatic hyperplasia without lower urinary tract symptoms    6. Primary osteoarthritis of both hands    7. Spondylosis of lumbar region without myelopathy or radiculopathy    8. Chronic allergic rhinitis    9. Screening for prostate cancer    10. Need for immunization against influenza        Plan:       Essential hypertension   Good control on this regimen. He is due for labs prior to his next appt.   -     " CBC auto differential; Future; Expected date: 05/01/2019  -     Comprehensive metabolic panel; Future; Expected date: 05/01/2019  -     Lipid panel; Future; Expected date: 05/01/2019  -     TSH; Future; Expected date: 05/01/2019  -     lisinopril 10 MG tablet; TAKE 1 TABLET(10 MG) BY MOUTH EVERY DAY  Dispense: 90 tablet; Refill: 3    Hyperlipidemia, unspecified hyperlipidemia type  Very mildly elevated. Continue to eat healthy and exercise.     Hypothyroidism, unspecified type  Good control on this dose of levothyroxine.     Primary insomnia  Controlled on temazepam and no evidence of abuse by .  He uses #30 every 3 months.     Benign non-nodular prostatic hyperplasia without lower urinary tract symptoms  Stable and improved on flomax.     Primary osteoarthritis of both hands  Supportive care. Okay to try glucosamine and parafon treatments.  He has mobic when needed.     Spondylosis of lumbar region without myelopathy or radiculopathy  Well controlled with and only flares with increased activity.     Chronic allergic rhinitis  Controlled on his current regimen.     Screening for prostate cancer  -     PSA, Screening; Future; Expected date: 05/01/2019    Need for immunization against influenza  -     Influenza - High Dose (65+) (PF) (IM)    Follow-up in about 6 months (around 6/12/2019) for chronic medical issues.

## 2019-01-11 ENCOUNTER — CLINICAL SUPPORT (OUTPATIENT)
Dept: FAMILY MEDICINE | Facility: CLINIC | Age: 74
End: 2019-01-11
Payer: MEDICARE

## 2019-01-11 ENCOUNTER — TELEPHONE (OUTPATIENT)
Dept: FAMILY MEDICINE | Facility: CLINIC | Age: 74
End: 2019-01-11

## 2019-01-11 VITALS — TEMPERATURE: 98 F

## 2019-01-11 DIAGNOSIS — R39.9 UTI SYMPTOMS: ICD-10-CM

## 2019-01-11 DIAGNOSIS — M54.12 CERVICAL RADICULAR PAIN: Primary | ICD-10-CM

## 2019-01-11 DIAGNOSIS — R39.9 UTI SYMPTOMS: Primary | ICD-10-CM

## 2019-01-11 LAB
BILIRUB SERPL-MCNC: NEGATIVE MG/DL
BLOOD URINE, POC: 250
COLOR, POC UA: ABNORMAL
GLUCOSE UR QL STRIP: NORMAL
KETONES UR QL STRIP: NEGATIVE
LEUKOCYTE ESTERASE URINE, POC: ABNORMAL
NITRITE, POC UA: POSITIVE
PH, POC UA: 7
PROTEIN, POC: ABNORMAL
SPECIFIC GRAVITY, POC UA: 1
UROBILINOGEN, POC UA: NORMAL

## 2019-01-11 PROCEDURE — 87088 URINE BACTERIA CULTURE: CPT

## 2019-01-11 PROCEDURE — 81002 POCT URINE DIPSTICK WITHOUT MICROSCOPE: ICD-10-PCS | Mod: S$GLB,,, | Performed by: INTERNAL MEDICINE

## 2019-01-11 PROCEDURE — 81002 URINALYSIS NONAUTO W/O SCOPE: CPT | Mod: S$GLB,,, | Performed by: INTERNAL MEDICINE

## 2019-01-11 PROCEDURE — 87077 CULTURE AEROBIC IDENTIFY: CPT

## 2019-01-11 PROCEDURE — 87186 SC STD MICRODIL/AGAR DIL: CPT

## 2019-01-11 PROCEDURE — 87086 URINE CULTURE/COLONY COUNT: CPT

## 2019-01-11 RX ORDER — CIPROFLOXACIN 500 MG/1
500 TABLET ORAL 2 TIMES DAILY
Qty: 20 TABLET | Refills: 0 | Status: SHIPPED | OUTPATIENT
Start: 2019-01-11 | End: 2019-06-12 | Stop reason: ALTCHOICE

## 2019-01-11 NOTE — TELEPHONE ENCOUNTER
Urine consistent with UIT. He is no pain.   Will start treatment with abx for cipro x 10 days.     He has continued numbness and tingling in his left arm.  It is not getting any better. Will xray his neck.     Thanks

## 2019-01-11 NOTE — PROGRESS NOTES
Type: Urinary Symptoms    Describe symptoms:  urinary frequency and urinary hesitancy  Duration? 3 days  Fever? Yes had fever Weds anf Thurs.  Unsure of what his temp was, but denies temp today.  Blood in Urine? No   Dark in color: No  Pain? no  Pain level 0/10   Location: n/a  Has the patient used any over the counter medications? no     If so, what?  n/a  Have you recently been treated for this?: No  Medication allergies? NKDA  Preferred pharmacy? [facility] [place]  Additional information:  See chart for POCT urine results.  POCT urine collected along with urine cx.  Dr. Gastelum notified.

## 2019-01-11 NOTE — TELEPHONE ENCOUNTER
Type: Urinary Symptoms    Describe symptoms:  urinary frequency, urinary hesitancy and fever  Duration? 3 days  Fever? Yes on Weds and Thurs had low grade temp unsure of degrees, but no fever now.  Blood in Urine? No   Dark in color: No  Pain? no  Pain level 0/10   Location: n/a  Has the patient used any over the counter medications? no     If so, what?  n/a  Have you recently been treated for this?: No  Medication allergies? NKDA  Preferred pharmacy? [facility]  Additional information:  Nurse visit made for today.  Pt verbalizes understanding.

## 2019-01-11 NOTE — TELEPHONE ENCOUNTER
----- Message from Lluvia Villarreal sent at 1/11/2019  9:14 AM CST -----  Contact: [place]  Type:  Same Day Appointment Request    Caller is requesting a same day appointment.  Caller declined first available appointment listed below.      Name of Caller:  patient  When is the first available appointment?  1/14/19  Symptoms:  Possible UTI/frequent urination/burning--did wake up Wednesday with fever & chills but that has subsided  Best Call Back Number:  [phone]  Additional Information:   Patient would like to be seen today--please advise--thank you

## 2019-01-11 NOTE — TELEPHONE ENCOUNTER
Pt notified that he has a UTI and Dr. Gastelum called in an abx for him to  and take twice daily for 10 days.  Also, scheduled pts xray of the neck and cancelled his appt with Dr. Gastelum for 1/14/19.  Pt verbalizes understanding.

## 2019-01-13 LAB — BACTERIA UR CULT: NORMAL

## 2019-01-16 ENCOUNTER — TELEPHONE (OUTPATIENT)
Dept: FAMILY MEDICINE | Facility: CLINIC | Age: 74
End: 2019-01-16

## 2019-01-16 ENCOUNTER — HOSPITAL ENCOUNTER (OUTPATIENT)
Dept: RADIOLOGY | Facility: HOSPITAL | Age: 74
Discharge: HOME | End: 2019-01-16
Attending: INTERNAL MEDICINE
Payer: MEDICARE

## 2019-01-16 DIAGNOSIS — M47.22 OSTEOARTHRITIS OF SPINE WITH RADICULOPATHY, CERVICAL REGION: Primary | ICD-10-CM

## 2019-01-16 DIAGNOSIS — M54.12 CERVICAL RADICULAR PAIN: ICD-10-CM

## 2019-01-16 PROCEDURE — 72050 X-RAY EXAM NECK SPINE 4/5VWS: CPT | Mod: 26,,, | Performed by: RADIOLOGY

## 2019-01-16 PROCEDURE — 72050 XR CERVICAL SPINE COMPLETE 5 VIEW: ICD-10-PCS | Mod: 26,,, | Performed by: RADIOLOGY

## 2019-01-16 PROCEDURE — 72050 X-RAY EXAM NECK SPINE 4/5VWS: CPT | Mod: TC,PN

## 2019-01-16 NOTE — TELEPHONE ENCOUNTER
PLease let him know that his xray of his neck showed degenerative arthritis and some narrowing as the nerve roots level the spinal canal.  We should get an MRI to better see what is going on.     Please schedule.     Thanks

## 2019-01-17 NOTE — TELEPHONE ENCOUNTER
Spoke to pt. Explained results of xray and the need for the MRI. Pt verbalized understanding and denied any additional questions or concerns at this time.    Pt scheduled for MRI and blood draw.

## 2019-01-21 ENCOUNTER — LAB VISIT (OUTPATIENT)
Dept: LAB | Facility: HOSPITAL | Age: 74
End: 2019-01-21
Attending: INTERNAL MEDICINE
Payer: MEDICARE

## 2019-01-21 DIAGNOSIS — M47.22 OSTEOARTHRITIS OF SPINE WITH RADICULOPATHY, CERVICAL REGION: ICD-10-CM

## 2019-01-21 LAB
CREAT SERPL-MCNC: 0.9 MG/DL
EST. GFR  (AFRICAN AMERICAN): >60 ML/MIN/1.73 M^2
EST. GFR  (NON AFRICAN AMERICAN): >60 ML/MIN/1.73 M^2

## 2019-01-21 PROCEDURE — 82565 ASSAY OF CREATININE: CPT

## 2019-01-21 PROCEDURE — 36415 COLL VENOUS BLD VENIPUNCTURE: CPT | Mod: PN

## 2019-01-28 ENCOUNTER — TELEPHONE (OUTPATIENT)
Dept: FAMILY MEDICINE | Facility: CLINIC | Age: 74
End: 2019-01-28

## 2019-01-28 DIAGNOSIS — G89.29 CHRONIC RADICULAR CERVICAL PAIN: Primary | ICD-10-CM

## 2019-01-28 DIAGNOSIS — M54.12 CHRONIC RADICULAR CERVICAL PAIN: Primary | ICD-10-CM

## 2019-01-28 NOTE — TELEPHONE ENCOUNTER
I called him with his MRI of cervical spine.     Please refer him to Dr. Hernandez in pain and call him with appt time.     thanks

## 2019-02-18 ENCOUNTER — TELEPHONE (OUTPATIENT)
Dept: FAMILY MEDICINE | Facility: CLINIC | Age: 74
End: 2019-02-18

## 2019-02-18 RX ORDER — TEMAZEPAM 30 MG/1
CAPSULE ORAL
Qty: 30 CAPSULE | Refills: 0 | Status: SHIPPED | OUTPATIENT
Start: 2019-02-18 | End: 2019-02-26

## 2019-02-19 NOTE — TELEPHONE ENCOUNTER
----- Message from Tomasa Pollack sent at 2/19/2019  3:59 PM CST -----   Type:  Pharmacy Calling to Clarify an RX    Name of Caller:  jordan  Pharmacy Name   [facility] [address] [place] [address] [address] [facility]  [address] [facility] [address]  Phone: [phone] Fax: [phone]  Prescription Name: temazepam 30 mg  / on  Back order  Please call  For  sub  Best Call Back Number:  [phone]

## 2019-02-20 ENCOUNTER — TELEPHONE (OUTPATIENT)
Dept: FAMILY MEDICINE | Facility: CLINIC | Age: 74
End: 2019-02-20

## 2019-02-20 NOTE — TELEPHONE ENCOUNTER
----- Message from Barbara Phan sent at 2/20/2019  1:10 PM CST -----  Type: Needs Medical Advice    Who Called: Patient  Pharmacy name and phone #:    [facility] [address] [place], [place] - [address] [facility]  [address] [facility] [address]  Phone: [phone] Fax: [phone]    Best Call Back Number: [phone]  Additional Information: Office called in the temazepam (RESTORIL) 30 mg capsule. They do not have the 30 mg but they do have the 15 mg in stock. Please resend and double the medication/.  Call when completed.

## 2019-02-22 NOTE — TELEPHONE ENCOUNTER
Please call the patient and let him know this is on back order.  Does he want me to send to a different pharm???    Thanks

## 2019-02-23 NOTE — TELEPHONE ENCOUNTER
Patient stated that the pharmacy advised him that they have the 15mg tablets in stock. He is wanting to know if you can send over an RX for the 15mg tablets and he can just take 2. Please advise.    Thanks

## 2019-02-26 RX ORDER — TEMAZEPAM 15 MG/1
30 CAPSULE ORAL NIGHTLY
Qty: 60 CAPSULE | Refills: 0 | Status: SHIPPED | OUTPATIENT
Start: 2019-02-26 | End: 2019-03-28

## 2019-02-26 NOTE — TELEPHONE ENCOUNTER
Letter stated that temazepam 30mg  is out of stock at numerous pharmacies and pt would like to have double amount of the temazepam 15mg called in. Dr Gastelum did it this am

## 2019-02-26 NOTE — TELEPHONE ENCOUNTER
----- Message from Prudence Mckinnon sent at 2/26/2019 10:43 AM CST -----  Contact: [phone]  Pt dropped off a sealed envelope for Dr. Gastelum to review.  In box.

## 2019-02-28 ENCOUNTER — OFFICE VISIT (OUTPATIENT)
Dept: PAIN MEDICINE | Facility: CLINIC | Age: 74
End: 2019-02-28
Payer: MEDICARE

## 2019-02-28 VITALS
HEART RATE: 90 BPM | DIASTOLIC BLOOD PRESSURE: 60 MMHG | RESPIRATION RATE: 18 BRPM | HEIGHT: 71 IN | BODY MASS INDEX: 24.85 KG/M2 | OXYGEN SATURATION: 97 % | TEMPERATURE: 97 F | SYSTOLIC BLOOD PRESSURE: 137 MMHG | WEIGHT: 177.5 LBS

## 2019-02-28 DIAGNOSIS — M54.12 CERVICAL RADICULOPATHY: ICD-10-CM

## 2019-02-28 DIAGNOSIS — M50.30 DDD (DEGENERATIVE DISC DISEASE), CERVICAL: Primary | ICD-10-CM

## 2019-02-28 PROCEDURE — 99999 PR PBB SHADOW E&M-EST. PATIENT-LVL V: CPT | Mod: PBBFAC,,, | Performed by: ANESTHESIOLOGY

## 2019-02-28 PROCEDURE — 99204 PR OFFICE/OUTPT VISIT, NEW, LEVL IV, 45-59 MIN: ICD-10-PCS | Mod: S$PBB,,, | Performed by: ANESTHESIOLOGY

## 2019-02-28 PROCEDURE — 99999 PR PBB SHADOW E&M-EST. PATIENT-LVL V: ICD-10-PCS | Mod: PBBFAC,,, | Performed by: ANESTHESIOLOGY

## 2019-02-28 PROCEDURE — 99204 OFFICE O/P NEW MOD 45 MIN: CPT | Mod: S$PBB,,, | Performed by: ANESTHESIOLOGY

## 2019-02-28 PROCEDURE — 99215 OFFICE O/P EST HI 40 MIN: CPT | Mod: PBBFAC,PN | Performed by: ANESTHESIOLOGY

## 2019-02-28 RX ORDER — GABAPENTIN 300 MG/1
300 CAPSULE ORAL NIGHTLY
Qty: 30 CAPSULE | Refills: 1 | Status: SHIPPED | OUTPATIENT
Start: 2019-02-28 | End: 2019-04-30 | Stop reason: SDUPTHER

## 2019-02-28 NOTE — LETTER
February 28, 2019      Maddi Gastelum DO  [address] [address]  [address]  [facility] [address]           [facility]  [address] [facility] [address]  Phone: [phone]  Fax: [phone]          Patient: Eric Buitrago   MR Number: [id number]   YOB: 1945   Date of Visit: 2/28/2019       Dear Dr. Maddi Gastelum:    Thank you for referring Eric Buitrago to me for evaluation. Attached you will find relevant portions of my assessment and plan of care.    If you have questions, please do not hesitate to call me. I look forward to following Eirc Buitrago along with you.    Sincerely,    Joe Hernandez MD    Enclosure  CC:  No Recipients    If you would like to receive this communication electronically, please contact [email or web address] or [phone] to request more information on [employer] Link access.    For providers and/or their staff who would like to refer a patient to [facility], please contact us through our one-stop-shop provider referral line, [facility], at [phone].    If you feel you have received this communication in error or would no longer like to receive these types of communications, please e-mail [email or web address]

## 2019-02-28 NOTE — PROGRESS NOTES
This note was completed with dictation software and grammatical errors may exist.    CC:  Left arm numbness    HPI:  The patient is a 73-year-old man with a history of hypertension, IBS who presents in referral from Dr. Gastelum for left arm tingling and numbness.  The patient reports that about two months ago he began having tingling from his right shoulder into his right hand.  He denies any specific trauma that may have caused this.  He does have some mild neck discomfort, states that the pain and tingling happens every 10-15 minutes and can last for about 3 min.  He states that is worse at night when trying to lay down, worse with turning his head to the side, improved with putting his arm over his head and turning his neck certain ways.  He denies any mike weakness.  The symptoms radiate through his arm into the thumb and first finger at times.  He denies any weakness in his arms or legs, no bowel or bladder incontinence.      ROS:  He reports joint stiffness, joint pain, difficulty sleeping.  Balance of review of systems is negative.    Past Medical History:   Diagnosis Date    Allergy     Hyperlipidemia     Hypertension     Hypothyroid     IBS (irritable bowel syndrome)        Past Surgical History:   Procedure Laterality Date    CHOLECYSTECTOMY  2007    COLONOSCOPY  ~2007    RABITO.    COLONOSCOPY N/A 2/21/2018    Performed by Josue Myles Jr., MD at [facility]       Social History     Socioeconomic History    Marital status:      Spouse name: None    Number of children: None    Years of education: None    Highest education level: None   Social Needs    Financial resource strain: None    Food insecurity - worry: None    Food insecurity - inability: None    Transportation needs - medical: None    Transportation needs - non-medical: None   Occupational History    Occupation: retired school psycholgist   Tobacco Use    Smoking status: Former Smoker     Last attempt to quit: 7/9/1983  "    Years since quittin.6    Smokeless tobacco: Never Used   Substance and Sexual Activity    Alcohol use: Yes     Comment: occ    Drug use: No    Sexual activity: None   Other Topics Concern    None   Social History Narrative    None         Medications/Allergies: See med card    Vitals:    19 1028   BP: 137/60   Pulse: 90   Resp: 18   Temp: 96.7 °F (35.9 °C)   TempSrc: Oral   SpO2: 97%   Weight: 80.5 kg (177 lb 7.5 oz)   Height: 5' 11" (1.803 m)   PainSc: 0-No pain         Physical exam:  Gen: A and O x3, pleasant, well-groomed  Skin: No rashes or obvious lesions  HEENT: PERRLA, no obvious deformities on ears or in canals.Trachea midline.  CVS: Regular rate and rhythm, normal palpable pulses.  Resp: Clear to auscultation bilaterally, no wheezes or rales.  Abdomen: Soft, NT/ND.  Musculoskeletal:  No antalgic gait.     Neuro:  Upper extremities: 5/5 strength bilaterally   Reflexes: Brachioradialis 2+, Bicep 2+, Tricep 2+.   Sensory: Intact and symmetrical to light touch and pinprick in C2-T1 dermatomes bilaterally.    Cervical Spine:  Cervical spine:  Range of motion is mildly reduced with lateral rotation of the right with no increased pain, moderately reduced with lateral rotation to the left with numbness and tingling occurring with oblique extension to the left.  Spurling's maneuver causes left arm numbness and tingling when turning to the left side.  Myofascial exam: No Tenderness to palpation across cervical paraspinous region bilaterally.      Imagin19 C-spine  C2-C3: Minimal disc osteophyte complex.  Mild-moderate left and minimal right facet hypertrophy.  No significant spinal canal or foraminal narrowing.  C3-C4: Mild disc osteophyte complex.  Mild bilateral facet hypertrophy.  Preserved ventral and dorsal CSF.  Minimal bilateral foraminal narrowing.  C4-C5: Mild disc osteophyte complex with prominent right uncovertebral hypertrophy.  Moderate right and mild-moderate left facet " hypertrophy.  Mild ligamentum flavum thickening.  Slight ventral and dorsal cord flattening with preserved ventral and dorsal CSF.  Moderate right and minimal left foraminal narrowing.  C5-C6: Retrolisthesis.  Moderate disc osteophyte complex.  Mild bilateral facet hypertrophy.  Mild ligamentum flavum thickening.  Mild ventral cord flattening with effacement of ventral and dorsal CSF.  Moderate-severe bilateral foraminal narrowing.  C6-C7: Anterolisthesis.  Unroofing of the disc.  Moderate bilateral facet hypertrophy.  Preserved ventral and dorsal CSF.  No significant foraminal narrowing.  C7-T1: Anterolisthesis.  Mild unroofing of the disc.  Mild ligamentum flavum thickening.  Moderate bilateral facet hypertrophy.  Preserved ventral and dorsal CSF.  Minimal left foraminal narrowing.      Assessment:   The patient is a 73-year-old man with a history of hypertension, IBS who presents in referral from Dr. Gastelum for left arm tingling and numbness.    1. DDD (degenerative disc disease), cervical  Ambulatory Referral to Physical/Occupational Therapy   2. Cervical radiculopathy  Ambulatory Referral to Physical/Occupational Therapy     Plan:  1.  We discussed that he has disc degeneration, facet arthropathy at C4/5 and C5/6.  This seems to primarily be a C6 distribution likely coming from the C5/6 foraminal narrowing.  He does not have much pain nor does he have any real weakness with this so I think we can manage this conservatively at first.  I am going to have him try physical therapy, have placed orders for him to go to the PT office in [place].  I am also going to start him on gabapentin 300 mg nightly.  I will have him follow up in 4-6 weeks or sooner as needed.    Thank you for referring this interesting patient, and I look forward to continuing to collaborate in his care.

## 2019-03-12 ENCOUNTER — CLINICAL SUPPORT (OUTPATIENT)
Dept: REHABILITATION | Facility: HOSPITAL | Age: 74
End: 2019-03-12
Attending: ANESTHESIOLOGY
Payer: MEDICARE

## 2019-03-12 DIAGNOSIS — M62.81 MUSCLE WEAKNESS: ICD-10-CM

## 2019-03-12 DIAGNOSIS — M54.12 CERVICAL RADICULOPATHY: Primary | ICD-10-CM

## 2019-03-12 PROCEDURE — 97162 PT EVAL MOD COMPLEX 30 MIN: CPT | Mod: PN | Performed by: PHYSICAL THERAPIST

## 2019-03-12 PROCEDURE — 97110 THERAPEUTIC EXERCISES: CPT | Mod: PN | Performed by: PHYSICAL THERAPIST

## 2019-03-12 NOTE — PLAN OF CARE
PHYSICAL THERAPY INITIAL EVALUATION    Name:Eric Buitrago  Physician:Joe Hernandez MD  Date of eval:3/12/2019  Orders:  Physical Therapy evaluate and treat  Clinic: [id number]  Diagnosis:  1. Cervical radiculopathy     2. Muscle weakness         Medical Diagnosis: cervical DDD, cervical radiculopathy    Precautions: history of LBP  Evaluation date: 3/12/2019  Visit # authorized: 1/1  Authorization period: 2-28-20  Plan of care expiration: 4-9-18  MD Follow up appt: 4-11-19    Time In:  1:02  Time Out:  2:00  Treatment time:  15 min    Subjective     Chief complaint: tingling and numbness in L UE which comes and goes, noticing more at night  Onset of pain : 11/2 month ago   Mechanism of onset :  No known injury    Denies neck pain or previous neck injury    Radicular symptoms:to L hand   Dizziness:neg denies HA also   Aggravating factors: looking up turning L some to R  Easing factors: stay neutral  Sleep is not disturbed. Sleeping position: back or R side.  If lie on L will get some numbness in arm, some numbness when lying on R but not as bad as when on L.  Pt states once he goes sleep he is not awakened due to symptoms.   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  Pt lives with wife and does gardening and is active    Allergies:    Review of patient's allergies indicates:   Allergen Reactions    No known drug allergies        Medical history: Gall bladder removed 2007  Past Medical History:   Diagnosis Date    Allergy     Hyperlipidemia     Hypertension     Hypothyroid     IBS (irritable bowel syndrome)        Medication:   Current Outpatient Medications on File Prior to Visit   Medication Sig Dispense Refill     "azelastine (ASTELIN) 137 mcg (0.1 %) nasal spray USE 1 SPRAY IN EACH NOSTRIL TWICE DAILY 30 mL 6    ciprofloxacin HCl (CIPRO) 500 MG tablet Take 1 tablet (500 mg total) by mouth 2 (two) times daily. 20 tablet 0    doxepin (SINEQUAN) 150 MG Cap TAKE 1 CAPSULE BY MOUTH EVERY DAY 90 capsule 2    fexofenadine (ALLEGRA) 60 MG tablet Take 1 tablet by mouth.      gabapentin (NEURONTIN) 300 MG capsule Take 1 capsule (300 mg total) by mouth every evening. 30 capsule 1    levothyroxine (SYNTHROID) 137 MCG Tab tablet TAKE 1 TABLET(137 MCG) BY MOUTH BEFORE BREAKFAST 90 tablet 3    lisinopril 10 MG tablet TAKE 1 TABLET(10 MG) BY MOUTH EVERY DAY 90 tablet 3    meloxicam (MOBIC) 15 MG tablet TAKE 1 TABLET(15 MG) BY MOUTH EVERY DAY 90 tablet 3    SHINGRIX, PF, 50 mcg/0.5 mL injection ADM 0.5ML IM UTD  0    tamsulosin (FLOMAX) 0.4 mg Cap TAKE ONE CAPSULE BY MOUTH EVERY EVENING 90 capsule 3    temazepam (RESTORIL) 15 mg Cap Take 2 capsules (30 mg total) by mouth every evening. 60 capsule 0     No current facility-administered medications on file prior to visit.      MRI:1. Multilevel spondylosis, greatest at C5-6 where there is mild cord flattening with effacement of ventral and dorsal CSF.  No cord signal abnormality.  2. Multilevel foraminal stenosis, greatest at C5-6 where it is moderate-severe bilaterally.  3. Moderate multilevel hypertrophic facet arthropathy.            Xray: Advanced multilevel degenerative change of the cervical spine resulting in multilevel neuroforaminal stenosis.    Pain level with 0 being the lowest and 10 being the highest presently: 0  Pain level with 0 being the lowest and 10 being the highest at worst: 4  Pain level with 0 being the lowest and 10 being the highest at best: 0     Patient Goals: "eliminate tingling and numbness"    Objective     Postural examination in standing:  - forward head  - forward shoulder  - slight increased  thoracic kyphosis  - slightly flattened lumbar " lordosis    Postural examination in sitting:   - forward head  - forward shoulder  - slightly increased thoracic kyphosis  - normal lumbar lordosis      Functional assessment: No deficits noted in areas noted below  - walking/gait:   - sit to stand:   - sit to supine:         - supine to sit:   - supine to prone:     Cervical ROM: (measured in degrees sitting)  - flexion -  35                    - extension -  55                        - right side bending -  25        - left side bending -  35      - right rotation - 60  - left rotation - 65    Shoulder ROM: (measured in degrees standing)  Shoulder  RUE LUE   Active Flexion 170 170   Active Abduction 170 170     Slight scapular instability noted with elevation and abduction in standing with increased winging, lateral rotation and protraction of the scapulae.      Shoulder ROM: full PROM B      Muscle Strength  MMT R L   Elbow flexion 5/5 5/5   Elbow extension 5/5 5/5   Shoulder flexion 5/5 5/5   Shoulder abduction 5/5 5/5   Shoulder external rotation 5/5 5/5   Shoulder internal rotation 5/5 5/5        Upper trap 5/5 5/5   Middle trap 3/5 3/5   Lower trap 3-/5 3-/5   Rhomboids 3/5 3/5       Endurance is fair    Special tests: NE L pelvic positioning    Palpation: no TTP    Joint mobility: mod-severe decreased mobility with P/A and side glide, Increased parasthesia with unsupported neck supine    Sensation: Intact  Reflexes: +2 biceps and triceps      Outcome measures:    FOTO neck disability index score: 63  PT reviewed FOTO scores for Eric Nino Cresara on 03/12/2019.   FOTO scores were entered into [employer] - see media section.    TREATMENT:  Therapeutic exercise: Eric received therapeutic exercises to develop strength, stabilization and endurance; flexibility and range of motion for 15 minutes including:see HEP sheet.   Chin tuck x 10  Shoulder shrug with ret x 10  Cervical flexion x 10 pain free zone  Cervical extension x 10 pain free zone  Cervical SB x 10 pain  free zone  Cervical rotation x 10 pain free zone  Shoulder extension prone x 10  Horizontal abd prone x 10  Shoulder ER sidelying x 10    Pt. Education: Instructed pt. regarding:proper technique with all exercises. Pt. to demonstrate good understanding of the education provided. Eric demonstrated good return demonstration of activities. No cultural, environmental, or spiritual barriers identified to treatment or learning.  Assessment   This is a 73 y.o. male referred to outpatient physical therapy and presents with a medical diagnosis of cervical DDD and cervical radiculopathy and PT diagnosis/findings of cervical radiculopathy L UE with tingling and numbness and scapular dyskinesia demonstrating limitations as described in the problem list. Patient was in agreement with set goals and plan of care. Pt was given a written HEP along with posture education, instruction on body mechanics, activity modification/avoidance, and core strengthening regimen. Pt. verbally understood instructions and demonstrated proper form/technique. Pt was advised to perform these exercises free of pain, and discontinue use if symptoms persist/worsen. Pt will benefit from physical therapy services in order to maximize pain free functional independence. Rehab potential is good    History  Co-morbidities and personal factors that may impact the plan of care Examination  Body Structures and Functions, activity limitations and participation restrictions that may impact the plan of care    Clinical Presentation   Co-morbidities:   history of LBP        Personal Factors:   no deficits Body Regions:   neck  upper extremities  trunk    Body Systems:    ROM  strength            Participation Restrictions:   ADL     Activity limitations:   Learning and applying knowledge  no deficits    General Tasks and Commands  no deficits    Communication  no deficits    Mobility  lifting and carrying objects  fine hand use (grasping/picking up)  driving (bike,  car, motorcycle)    Self care  washing oneself (bathing, drying, washing hands)  caring for body parts (brushing teeth, shaving, grooming)  eating  drinking    Domestic Life  shopping  cooking  doing house work (cleaning house, washing dishes, laundry)  assisting others    Interactions/Relationships  no deficits    Life Areas  no deficits    Community and Social Life  no deficits         evolving clinical presentation with changing clinical characteristics                      moderate   moderate  moderate Decision Making/ Complexity Score:  moderate       Medical necessity is demonstrated by the following IMPAIRMENTS/PROBLEM LIST:  Decreased range of motion  Decreased strength  Poor posture  Decreased scapular stabilization  Increased symptoms lying down  Increased symptoms with looking up  Increased symptoms with turning head  Increased symptoms with ADL and household activities    GOALS:   Short Term Goals:  2 weeks  Increase range of motion 25%  Increase strength 1/2 muscle grade  Increase postural awareness to normal  Be able to perform HEP with minimal cueing required    Long Term Goals: 4 weeks  Increase range of motion to 75%to 100% of full  Increase strength 1 muscle grade  Improve scapular stabilization to normal  Restore ability to lie down without symptoms  Restore ability to look up without increased symptoms  Restore ability to reach turn head without increased symptoms  Restore ability to perform ADL's and household activities independently and without symptoms  Pt to be independent with HEP to improve ROM and independence with ADL's    Plan     Pt will be treated by physical therapy 2 times a week for 4 weeks to include:   Therapeutic exercises to increase ROM, strength and stabilization; joint and soft tissue mobilization with manual therapy techniques to decrease muscle tightness, pain and improve joint mobility; neuromuscular re-education to improve posture, coordination, kinesthetic sense and  proprioception, therapeutic activities to improve coordination, strength and function, therapeutic taping to decrease pain, provide support and improve function of UE(s); modalities such as moist heat, ice, ultrasound and electrical stimulation to increase circulation, decrease pain and inflammation; dry needling with manual therapy techniques to decrease pain, inflammation and swelling, increase circulation and promote healing process will be considered and utilized as needed.  Pt may be seen by PTA to carry out plan of care as part of Rehab team.      I certify the need for these services furnished under this plan of treatment and while under my care.    ____________________________________ Physician/Referring Practitioner                                Date of Signature

## 2019-03-12 NOTE — PROGRESS NOTES
PHYSICAL THERAPY INITIAL EVALUATION    Name:Eric Buitrago  Physician:Joe Hernandez MD  Date of eval:3/12/2019  Orders:  Physical Therapy evaluate and treat  Clinic: [id number]  Diagnosis:  1. Cervical radiculopathy     2. Muscle weakness         Medical Diagnosis: cervical DDD, cervical radiculopathy    Precautions: history of LBP  Evaluation date: 3/12/2019  Visit # authorized: 1/1  Authorization period: 2-28-20  Plan of care expiration: 4-9-18  MD Follow up appt: 4-11-19    Time In:  1:02  Time Out:  2:00  Treatment time:  15 min    Subjective     Chief complaint: tingling and numbness in L UE which comes and goes, noticing more at night  Onset of pain : 11/2 month ago   Mechanism of onset :  No known injury    Denies neck pain or previous neck injury    Radicular symptoms:to L hand   Dizziness:neg denies HA also   Aggravating factors: looking up turning L some to R  Easing factors: stay neutral  Sleep is not disturbed. Sleeping position: back or R side.  If lie on L will get some numbness in arm, some numbness when lying on R but not as bad as when on L.  Pt states once he goes sleep he is not awakened due to symptoms.   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  Pt lives with wife and does gardening and is active    Allergies:    Review of patient's allergies indicates:   Allergen Reactions    No known drug allergies        Medical history: Gall bladder removed 2007  Past Medical History:   Diagnosis Date    Allergy     Hyperlipidemia     Hypertension     Hypothyroid     IBS (irritable bowel syndrome)        Medication:   Current Outpatient Medications on File Prior to Visit   Medication Sig Dispense Refill     "azelastine (ASTELIN) 137 mcg (0.1 %) nasal spray USE 1 SPRAY IN EACH NOSTRIL TWICE DAILY 30 mL 6    ciprofloxacin HCl (CIPRO) 500 MG tablet Take 1 tablet (500 mg total) by mouth 2 (two) times daily. 20 tablet 0    doxepin (SINEQUAN) 150 MG Cap TAKE 1 CAPSULE BY MOUTH EVERY DAY 90 capsule 2    fexofenadine (ALLEGRA) 60 MG tablet Take 1 tablet by mouth.      gabapentin (NEURONTIN) 300 MG capsule Take 1 capsule (300 mg total) by mouth every evening. 30 capsule 1    levothyroxine (SYNTHROID) 137 MCG Tab tablet TAKE 1 TABLET(137 MCG) BY MOUTH BEFORE BREAKFAST 90 tablet 3    lisinopril 10 MG tablet TAKE 1 TABLET(10 MG) BY MOUTH EVERY DAY 90 tablet 3    meloxicam (MOBIC) 15 MG tablet TAKE 1 TABLET(15 MG) BY MOUTH EVERY DAY 90 tablet 3    SHINGRIX, PF, 50 mcg/0.5 mL injection ADM 0.5ML IM UTD  0    tamsulosin (FLOMAX) 0.4 mg Cap TAKE ONE CAPSULE BY MOUTH EVERY EVENING 90 capsule 3    temazepam (RESTORIL) 15 mg Cap Take 2 capsules (30 mg total) by mouth every evening. 60 capsule 0     No current facility-administered medications on file prior to visit.      MRI:1. Multilevel spondylosis, greatest at C5-6 where there is mild cord flattening with effacement of ventral and dorsal CSF.  No cord signal abnormality.  2. Multilevel foraminal stenosis, greatest at C5-6 where it is moderate-severe bilaterally.  3. Moderate multilevel hypertrophic facet arthropathy.            Xray: Advanced multilevel degenerative change of the cervical spine resulting in multilevel neuroforaminal stenosis.    Pain level with 0 being the lowest and 10 being the highest presently: 0  Pain level with 0 being the lowest and 10 being the highest at worst: 4  Pain level with 0 being the lowest and 10 being the highest at best: 0     Patient Goals: "eliminate tingling and numbness"    Objective     Postural examination in standing:  - forward head  - forward shoulder  - slight increased  thoracic kyphosis  - slightly flattened lumbar " lordosis    Postural examination in sitting:   - forward head  - forward shoulder  - slightly increased thoracic kyphosis  - normal lumbar lordosis      Functional assessment: No deficits noted in areas noted below  - walking/gait:   - sit to stand:   - sit to supine:         - supine to sit:   - supine to prone:     Cervical ROM: (measured in degrees sitting)  - flexion -  35                    - extension -  55                        - right side bending -  25        - left side bending -  35      - right rotation - 60  - left rotation - 65    Shoulder ROM: (measured in degrees standing)  Shoulder  RUE LUE   Active Flexion 170 170   Active Abduction 170 170     Slight scapular instability noted with elevation and abduction in standing with increased winging, lateral rotation and protraction of the scapulae.      Shoulder ROM: full PROM B      Muscle Strength  MMT R L   Elbow flexion 5/5 5/5   Elbow extension 5/5 5/5   Shoulder flexion 5/5 5/5   Shoulder abduction 5/5 5/5   Shoulder external rotation 5/5 5/5   Shoulder internal rotation 5/5 5/5        Upper trap 5/5 5/5   Middle trap 3/5 3/5   Lower trap 3-/5 3-/5   Rhomboids 3/5 3/5       Endurance is fair    Special tests: DE L pelvic positioning    Palpation: no TTP    Joint mobility: mod-severe decreased mobility with P/A and side glide, Increased parasthesia with unsupported neck supine    Sensation: Intact  Reflexes: +2 biceps and triceps      Outcome measures:    FOTO neck disability index score: 63  PT reviewed FOTO scores for Eric Nino Cresara on 03/12/2019.   FOTO scores were entered into [facility] - see media section.    TREATMENT:  Therapeutic exercise: Eric received therapeutic exercises to develop strength, stabilization and endurance; flexibility and range of motion for 15 minutes including:see HEP sheet.   Chin tuck x 10  Shoulder shrug with ret x 10  Cervical flexion x 10 pain free zone  Cervical extension x 10 pain free zone  Cervical SB x 10 pain  free zone  Cervical rotation x 10 pain free zone  Shoulder extension prone x 10  Horizontal abd prone x 10  Shoulder ER sidelying x 10    Pt. Education: Instructed pt. regarding:proper technique with all exercises. Pt. to demonstrate good understanding of the education provided. Eric demonstrated good return demonstration of activities. No cultural, environmental, or spiritual barriers identified to treatment or learning.  Assessment   This is a 73 y.o. male referred to outpatient physical therapy and presents with a medical diagnosis of cervical DDD and cervical radiculopathy and PT diagnosis/findings of cervical radiculopathy L UE with tingling and numbness and scapular dyskinesia demonstrating limitations as described in the problem list. Patient was in agreement with set goals and plan of care. Pt was given a written HEP along with posture education, instruction on body mechanics, activity modification/avoidance, and core strengthening regimen. Pt. verbally understood instructions and demonstrated proper form/technique. Pt was advised to perform these exercises free of pain, and discontinue use if symptoms persist/worsen. Pt will benefit from physical therapy services in order to maximize pain free functional independence. Rehab potential is good    History  Co-morbidities and personal factors that may impact the plan of care Examination  Body Structures and Functions, activity limitations and participation restrictions that may impact the plan of care    Clinical Presentation   Co-morbidities:   history of LBP        Personal Factors:   no deficits Body Regions:   neck  upper extremities  trunk    Body Systems:    ROM  strength            Participation Restrictions:   ADL     Activity limitations:   Learning and applying knowledge  no deficits    General Tasks and Commands  no deficits    Communication  no deficits    Mobility  lifting and carrying objects  fine hand use (grasping/picking up)  driving (bike,  car, motorcycle)    Self care  washing oneself (bathing, drying, washing hands)  caring for body parts (brushing teeth, shaving, grooming)  eating  drinking    Domestic Life  shopping  cooking  doing house work (cleaning house, washing dishes, laundry)  assisting others    Interactions/Relationships  no deficits    Life Areas  no deficits    Community and Social Life  no deficits         evolving clinical presentation with changing clinical characteristics                      moderate   moderate  moderate Decision Making/ Complexity Score:  moderate       Medical necessity is demonstrated by the following IMPAIRMENTS/PROBLEM LIST:  Decreased range of motion  Decreased strength  Poor posture  Decreased scapular stabilization  Increased symptoms lying down  Increased symptoms with looking up  Increased symptoms with turning head  Increased symptoms with ADL and household activities    GOALS:   Short Term Goals:  2 weeks  Increase range of motion 25%  Increase strength 1/2 muscle grade  Increase postural awareness to normal  Be able to perform HEP with minimal cueing required    Long Term Goals: 4 weeks  Increase range of motion to 75%to 100% of full  Increase strength 1 muscle grade  Improve scapular stabilization to normal  Restore ability to lie down without symptoms  Restore ability to look up without increased symptoms  Restore ability to reach turn head without increased symptoms  Restore ability to perform ADL's and household activities independently and without symptoms  Pt to be independent with HEP to improve ROM and independence with ADL's    Plan     Pt will be treated by physical therapy 2 times a week for 4 weeks to include:   Therapeutic exercises to increase ROM, strength and stabilization; joint and soft tissue mobilization with manual therapy techniques to decrease muscle tightness, pain and improve joint mobility; neuromuscular re-education to improve posture, coordination, kinesthetic sense and  proprioception, therapeutic activities to improve coordination, strength and function, therapeutic taping to decrease pain, provide support and improve function of UE(s); modalities such as moist heat, ice, ultrasound and electrical stimulation to increase circulation, decrease pain and inflammation; dry needling with manual therapy techniques to decrease pain, inflammation and swelling, increase circulation and promote healing process will be considered and utilized as needed.  Pt may be seen by PTA to carry out plan of care as part of Rehab team.      I certify the need for these services furnished under this plan of treatment and while under my care.    ____________________________________ Physician/Referring Practitioner                                Date of Signature

## 2019-03-18 ENCOUNTER — CLINICAL SUPPORT (OUTPATIENT)
Dept: REHABILITATION | Facility: HOSPITAL | Age: 74
End: 2019-03-18
Attending: ANESTHESIOLOGY
Payer: MEDICARE

## 2019-03-18 DIAGNOSIS — M54.12 CERVICAL RADICULOPATHY: Primary | ICD-10-CM

## 2019-03-18 DIAGNOSIS — M62.81 MUSCLE WEAKNESS: ICD-10-CM

## 2019-03-18 PROCEDURE — 97012 MECHANICAL TRACTION THERAPY: CPT | Mod: PN | Performed by: PHYSICAL THERAPIST

## 2019-03-18 PROCEDURE — 97110 THERAPEUTIC EXERCISES: CPT | Mod: PN | Performed by: PHYSICAL THERAPIST

## 2019-03-18 NOTE — PROGRESS NOTES
Physical Therapy Daily Note     Name: Eric Buitrago  Clinic Number: [id number]  Diagnosis:   Encounter Diagnoses   Name Primary?    Cervical radiculopathy Yes    Muscle weakness      Physician: Joe Hernandez MD    Treatment Orders: PT Eval and Treat      Past Medical History:   Diagnosis Date    Allergy     Hyperlipidemia     Hypertension     Hypothyroid     IBS (irritable bowel syndrome)      Medical Diagnosis: cervical DDD, cervical radiculopathy     Precautions: history of LBP  Evaluation date: 3/12/2019  Visit # authorized: 2/20  Authorization period: 12-31-19  Plan of care expiration: 4-9-18  MD Follow up appt: 4-11-19      Time In:  2:07  Time Out:  3:15  Total Billable Time:  60 min    Subjective     Pt reports: still feeling arm some, but not as bad as it was. Pt states main problem is in evening at night when lying down.  Using wife's cervical pillow at night which has helped and ordered one for himself    He was compliant with home exercise program.  Response to previous treatment: was ok, no problems  Functional change: sleeping a little better    Location: left arm and neck    Pain Scale: before treatment: 0 with no tingling  currently; after treatment: 0 with no tingling    Objective     Level pelvis to start    TREATMENT  Therapeutic exercise: Eric received therapeutic exercises to develop strength, endurance, ROM, flexibility, posture and core stabilization for 45 minutes including:   Instructed pt in spinal anatomy and biomechanics to help avoid radicular symptoms Reiterated keeping ex in pain free zone    Chin tuck x 10  Shoulder shrug with ret x 10  Cervical flexion x 10 pain free zone  Cervical extension x 10 pain free zone  Cervical SB x 10 pain free zone  Cervical rotation x 10 pain free zone  Shoulder extension prone x 2/10 over end of table  Horizontal abd prone x 2/`0 over end of table      Retraction with pulldown with YTB x 10   Retraction with YTB x 10   ER with YTB x 10  with towel at side  Provided YTB for home use      Modified push/pull  X 3         Manual therapy: Eric  received the following manual therapy techniques x 5 min. to include soft tissue and joint mobilization were applied to the: neck and upper trapL  to include: STM to upper trap    Cervical mechanical traction was performed to decompress spine and decrease muscle tightness and pain.  Traction was performed with a 30 second hold with a load of 20# and 10 second rest with a load of 10# for 10minutes.       Written Home Exercises Provided: indented ex noted above.  Exercises were reviewed and Donavan was able to demonstrate them prior to the end of the session.  Donavan demonstrated good  understanding of the education provided.     See EMR under Patient Instructions for exercises provided 3/18/2019.      Pt. education:  · Posture reeducation, body mechanics, HEP,   · No spiritual or educational barriers to learning provided  · Pt has no cultural, educational or language barriers to learning provided.    Assessment     Pt with increased awareness of posture and understands to avoid ranges that lead to radicular symptoms.  Pt reported he liked traction and felt good to neck with no radicular symptoms Pt tolerated progression well   Donavan is progressing well towards his goals.   Pt prognosis is Good.       Pt will continue to benefit from skilled outpatient physical therapy to address the remaining functional deficits, provide pt/family education, and to maximize pt's level of independence in the home and community environment. .     GOALS:   Short Term Goals:  2 weeks  Increase range of motion 25%  Increase strength 1/2 muscle grade  Increase postural awareness to normal  Be able to perform HEP with minimal cueing required     Long Term Goals: 4 weeks  Increase range of motion to 75%to 100% of full  Increase strength 1 muscle grade  Improve scapular stabilization to normal  Restore ability to lie down without symptoms  Restore  ability to look up without increased symptoms  Restore ability to reach turn head without increased symptoms  Restore ability to perform ADL's and household activities independently and without symptoms  Pt to be independent with HEP to improve ROM and independence with ADL's    Anticipated barriers to physical therapy: none  Pt's spiritual, cultural and educational needs considered and pt agreeable to plan of care and goals        Plan   Continue with established Plan of Care towards PT goals.    Liz De La Rosa, PT

## 2019-03-21 ENCOUNTER — CLINICAL SUPPORT (OUTPATIENT)
Dept: REHABILITATION | Facility: HOSPITAL | Age: 74
End: 2019-03-21
Attending: ANESTHESIOLOGY
Payer: MEDICARE

## 2019-03-21 DIAGNOSIS — M62.81 MUSCLE WEAKNESS: ICD-10-CM

## 2019-03-21 DIAGNOSIS — M54.12 CERVICAL RADICULOPATHY: Primary | ICD-10-CM

## 2019-03-21 PROCEDURE — 97012 MECHANICAL TRACTION THERAPY: CPT | Mod: PN | Performed by: PHYSICAL THERAPIST

## 2019-03-21 PROCEDURE — 97110 THERAPEUTIC EXERCISES: CPT | Mod: PN | Performed by: PHYSICAL THERAPIST

## 2019-03-21 NOTE — PROGRESS NOTES
Physical Therapy Daily Note     Name: Eric Buitrago  Clinic Number: [id number]  Diagnosis:   Encounter Diagnoses   Name Primary?    Cervical radiculopathy Yes    Muscle weakness      Physician: Joe Hernandez MD    Treatment Orders: PT Eval and Treat      Past Medical History:   Diagnosis Date    Allergy     Hyperlipidemia     Hypertension     Hypothyroid     IBS (irritable bowel syndrome)      Medical Diagnosis: cervical DDD, cervical radiculopathy     Precautions: history of LBP  Evaluation date: 3/12/2019  Visit # authorized: 2/20  Authorization period: 12-31-19  Plan of care expiration: 4-9-18  MD Follow up appt: 4-11-19      Time In:  1:02  Time Out:  2:15  Total Billable Time:  60 min    Subjective     Pt reports: feeling about same Tingling comes and goes After last treatment he thinks he had less episodes of tingling that day and night  He was compliant with home exercise program.  Response to previous treatment: was ok, no problems  Functional change: still working on finding the right pillow     Location: left arm and neck    Pain Scale: before treatment: 0 with no tingling  currently; after treatment: 0 with no tingling    Objective     Level pelvis to start  No significant tightness noted in upper trap today    TREATMENT  Therapeutic exercise: Eric received therapeutic exercises to develop strength, endurance, ROM, flexibility, posture and core stabilization for 45 minutes including:   Reiterated spinal anatomy and biomechanics to help avoid radicular symptoms Reiterated keeping ex in pain free zone    Instructed pt in needs for pillow for sleep and went on [employer] website with pt to look at different pillows as pt wants to go by the store after PT today.    Chin tuck x 10  Shoulder shrug with ret x 10  Cervical flexion x 10 pain free zone  Cervical extension x 10 pain free zone  Cervical SB x 10 pain free zone, instructed pt again to avoid going into pain ful zone  Cervical  rotation x 10 pain free zone  Shoulder extension prone x 2/10 over end of table  Horizontal abd prone x 2/10 over end of table     Retraction with pulldown with YTB x 15  Retraction with YTB x 15  ER with YTB x 15 with towel at side     pect stretch x 5 x 3 planes      Modified push/pull  X 3       Cervical mechanical traction was performed to decompress spine and decrease muscle tightness and pain.  Traction was performed with a 30 second hold with a load of 22# and 10 second rest with a load of 10# for 15 minutes.       Written Home Exercises Provided: indented ex noted above.  Exercises were reviewed and Donavan was able to demonstrate them prior to the end of the session.  Donavan demonstrated good  understanding of the education provided.     See EMR under Patient Instructions for exercises provided 3/18/2019.      Pt. education:  · Posture reeducation, body mechanics, HEP,   · No spiritual or educational barriers to learning provided  · Pt has no cultural, educational or language barriers to learning provided.    Assessment     Pt with increased awareness of posture and understands to avoid ranges that lead to radicular symptoms.  Pt reported he liked traction and felt good to neck with no radicular symptoms Pt tolerated progression well   Donavan is progressing well towards his goals.   Pt prognosis is Good.     Pt will continue to benefit from skilled outpatient physical therapy to address the remaining functional deficits, provide pt/family education, and to maximize pt's level of independence in the home and community environment. .     GOALS:   Short Term Goals:  2 weeks  Increase range of motion 25%  Increase strength 1/2 muscle grade  Increase postural awareness to normal  Be able to perform HEP with minimal cueing required     Long Term Goals: 4 weeks  Increase range of motion to 75%to 100% of full  Increase strength 1 muscle grade  Improve scapular stabilization to normal  Restore ability to lie down without  symptoms  Restore ability to look up without increased symptoms  Restore ability to reach turn head without increased symptoms  Restore ability to perform ADL's and household activities independently and without symptoms  Pt to be independent with HEP to improve ROM and independence with ADL's    Anticipated barriers to physical therapy: none  Pt's spiritual, cultural and educational needs considered and pt agreeable to plan of care and goals        Plan   Continue with established Plan of Care towards PT goals.    Liz De La Rosa, PT

## 2019-03-25 ENCOUNTER — CLINICAL SUPPORT (OUTPATIENT)
Dept: REHABILITATION | Facility: HOSPITAL | Age: 74
End: 2019-03-25
Attending: ANESTHESIOLOGY
Payer: MEDICARE

## 2019-03-25 DIAGNOSIS — M62.81 MUSCLE WEAKNESS: ICD-10-CM

## 2019-03-25 DIAGNOSIS — M54.12 CERVICAL RADICULOPATHY: Primary | ICD-10-CM

## 2019-03-25 PROCEDURE — 97012 MECHANICAL TRACTION THERAPY: CPT | Mod: PN | Performed by: PHYSICAL THERAPIST

## 2019-03-25 PROCEDURE — 97140 MANUAL THERAPY 1/> REGIONS: CPT | Mod: PN | Performed by: PHYSICAL THERAPIST

## 2019-03-25 PROCEDURE — 97110 THERAPEUTIC EXERCISES: CPT | Mod: PN | Performed by: PHYSICAL THERAPIST

## 2019-03-25 NOTE — PROGRESS NOTES
Physical Therapy Daily Note     Name: Eric Buitrago  Clinic Number: [id number]  Diagnosis:   Encounter Diagnoses   Name Primary?    Cervical radiculopathy Yes    Muscle weakness      Physician: Joe Hernandez MD    Treatment Orders: PT Eval and Treat      Past Medical History:   Diagnosis Date    Allergy     Hyperlipidemia     Hypertension     Hypothyroid     IBS (irritable bowel syndrome)      Medical Diagnosis: cervical DDD, cervical radiculopathy     Precautions: history of LBP  Evaluation date: 3/12/2019  Visit # authorized: 4/20  Authorization period: 12-31-19  Plan of care expiration: 4-9-18  MD Follow up appt: 4-11-19      Time In:  2:10  Time Out:  3:15  Total Billable Time:  55 min    Subjective     Pt reports: still having symptoms in the day about the same, got new pillow and better at night.  Pt states still pain with extension and rotation    He was compliant with home exercise program.  Response to previous treatment: was ok, no problems  Functional change: still working on finding the right pillow     Location: left arm and neck    Pain Scale: before treatment: 0 with no tingling  currently; after treatment: 0 with no tingling    Objective     Level pelvis to start  Mod tightness L upper trap and suboccipital     TREATMENT  Therapeutic exercise: Eric received therapeutic exercises to develop strength, endurance, ROM, flexibility, posture and core stabilization for 30 minutes including:   Reviewed  spinal anatomy and biomechanics again to help avoid radicular symptoms Reiterated keeping ex in pain free zone     Pt unable to avoid pain with extension and SB L so will hold those ex for now Instructed pt to be careful with ADL to avoid motions in those directions to avoid further irritation    Chin tuck x 10  Shoulder shrug with ret x 10  Cervical flexion x 10 pain free zone  HOLDCervical extension x 10 pain free zone  Cervical SB R x 10 pain free zone, HOLD L  Cervical rotation R  x 10  pain free zone  HOLD L  Shoulder extension prone x 2/10 with 1#  Horizontal abd prone x 2/10     Retraction with pulldown with YTB x 15  Retraction with YTB x 15  ER with YTB x 15 with towel at side    pect stretch x 5 x 3 planes    Modified push/pull  X 3    Manual therapy techniques were performed to improve soft tissue mobility, decrease muscle tightness and pain to neck and upper trap L   for 10 minutes.   STM to neck and upper trap L with suboccipital release        Cervical mechanical traction was performed to decompress spine and decrease muscle tightness and pain.  Traction was performed with a 30 second hold with a load of 22# and 10 second rest with a load of 10# for 15 minutes.       Written Home Exercises Provided: none today  Exercises were reviewed and Donavan was able to demonstrate them prior to the end of the session.  Donavan demonstrated good  understanding of the education provided.     See EMR under Patient Instructions for exercises provided 3/18/2019.      Pt. education:  · Posture reeducation, body mechanics, HEP,   · No spiritual or educational barriers to learning provided  · Pt has no cultural, educational or language barriers to learning provided.    Assessment     Pt continues with symptoms in day, though improving with sleep with new pillow.  Pt may benefit from avoiding ex with BB and rot and SB L and understands to avoid ADL in these positions also and will follow response to see if further decrease in inflammation can be achieved by avoid painful positions  Pt will benefit from manual therapy for soft tissue work and from mechanical traction for spinal decompression thus indicating need for 59 modifier  Donavan is progressing well towards his goals.   Pt prognosis is Good.     Pt will continue to benefit from skilled outpatient physical therapy to address the remaining functional deficits, provide pt/family education, and to maximize pt's level of independence in the home and community  environment. .     GOALS:   Short Term Goals:  2 weeks  Increase range of motion 25%  Increase strength 1/2 muscle grade  Increase postural awareness to normal  Be able to perform HEP with minimal cueing required     Long Term Goals: 4 weeks  Increase range of motion to 75%to 100% of full  Increase strength 1 muscle grade  Improve scapular stabilization to normal  Restore ability to lie down without symptoms  Restore ability to look up without increased symptoms  Restore ability to reach turn head without increased symptoms  Restore ability to perform ADL's and household activities independently and without symptoms  Pt to be independent with HEP to improve ROM and independence with ADL's    Anticipated barriers to physical therapy: none  Pt's spiritual, cultural and educational needs considered and pt agreeable to plan of care and goals        Plan   Continue with established Plan of Care towards PT goals.    Liz De La Rosa, PT

## 2019-04-02 ENCOUNTER — CLINICAL SUPPORT (OUTPATIENT)
Dept: REHABILITATION | Facility: HOSPITAL | Age: 74
End: 2019-04-02
Attending: ANESTHESIOLOGY
Payer: MEDICARE

## 2019-04-02 DIAGNOSIS — M54.12 CERVICAL RADICULOPATHY: Primary | ICD-10-CM

## 2019-04-02 DIAGNOSIS — M62.81 MUSCLE WEAKNESS: ICD-10-CM

## 2019-04-02 PROCEDURE — 97140 MANUAL THERAPY 1/> REGIONS: CPT | Mod: 59,PN | Performed by: PHYSICAL THERAPIST

## 2019-04-02 PROCEDURE — 97110 THERAPEUTIC EXERCISES: CPT | Mod: PN | Performed by: PHYSICAL THERAPIST

## 2019-04-02 PROCEDURE — 97012 MECHANICAL TRACTION THERAPY: CPT | Mod: 59,PN | Performed by: PHYSICAL THERAPIST

## 2019-04-02 NOTE — PROGRESS NOTES
Physical Therapy Daily Note     Name: Eric Buitrago  Clinic Number: [id number]  Diagnosis:   Encounter Diagnoses   Name Primary?    Cervical radiculopathy Yes    Muscle weakness      Physician: Joe Hernandez MD    Treatment Orders: PT Eval and Treat      Past Medical History:   Diagnosis Date    Allergy     Hyperlipidemia     Hypertension     Hypothyroid     IBS (irritable bowel syndrome)      Medical Diagnosis: cervical DDD, cervical radiculopathy     Precautions: history of LBP  Evaluation date: 3/12/2019  Visit # authorized: 4/20  Authorization period: 12-31-19  Plan of care expiration: 4-9-18  MD Follow up appt: 4-11-19      Time In:  3:05  Time Out:  4:10  Total Billable Time:  55 min    Subjective     Pt reports: still getting tingling with lying down but does resolve.  Pt states he feels comfortable with new pillow Pt states less frequency of tingling   He was compliant with home exercise program.  Response to previous treatment: was ok, no problems  Functional change: sleeping better    Location: left arm and neck    Pain Scale: before treatment: 0 with no tingling  currently; after treatment: 0 with no tingling    Objective     Level pelvis to start  Min-mod tightness L upper trap and suboccipital     TREATMENT  Therapeutic exercise: Eric received therapeutic exercises to develop strength, endurance, ROM, flexibility, posture and core stabilization for 30 minutes including:   Instructed pt to use cervical roll to sleep to give more neck support with new pillow    Reviewed  spinal anatomy and biomechanics again to help avoid radicular symptoms Reiterated keeping ex in pain free zone     Pt unable to avoid pain with extension and SB L so will hold those ex for now Instructed pt to be careful with ADL to avoid motions in those directions to avoid further irritation    Chin tuck x 10  Shoulder shrug with ret x 10  Cervical flexion x 10 pain free zone  HOLDCervical extension x 10 pain free  "zone  Cervical SB R x 10 pain free zone, HOLD L  Cervical rotation R  x 10 pain free zone  HOLD L  Shoulder extension prone x 2/10 with 1#  Horizontal abd prone x 2/10     Retraction with pulldown with YTB x 20  Retraction with YTB x 20  ER with YTB x 20 with towel at side    pect stretch x 5 x 3 planes    Modified push/pull  X 3    Verbal and tactile cueing provided for proper performance and recruitment.      Manual therapy techniques were performed to improve soft tissue mobility, decrease muscle tightness and pain to neck and upper trap L   for 10 minutes.   STM to neck and upper trap L with suboccipital release     Kinesiotape to inhibit upper trap with 8" I strip. 8" Y strip to inhibit deltoid and 2 6" X strips to stimulate rhomboids was performed to decrease pain and improve functional use of UE.  Instructed pt in use, care and precautions with tape.       Cervical mechanical traction was performed to decompress spine and decrease muscle tightness and pain.  Traction was performed with a 30 second hold with a load of 26# and 10 second rest with a load of 13# for 15 minutes.       Written Home Exercises Provided: none today  Exercises were reviewed and Donavan was able to demonstrate them prior to the end of the session.  Donavan demonstrated good  understanding of the education provided.     See EMR under Patient Instructions for exercises provided 3/18/2019.      Pt. education:  · Posture reeducation, body mechanics, HEP,   · No spiritual or educational barriers to learning provided  · Pt has no cultural, educational or language barriers to learning provided.    Assessment   Pt with decreased frequency of tingling, and may benefit from cervical roll with sleep.  Pt to continue to hold SB and rot L to allow further healing to occur. Pt with improving ST mobility  Pt will benefit from manual therapy for soft tissue work and from mechanical traction for spinal decompression thus indicating need for 59 modifier  Donavan is " progressing well towards his goals.   Pt prognosis is Good.     Pt will continue to benefit from skilled outpatient physical therapy to address the remaining functional deficits, provide pt/family education, and to maximize pt's level of independence in the home and community environment. .     GOALS:   Short Term Goals:  2 weeks  Increase range of motion 25%  Increase strength 1/2 muscle grade  Increase postural awareness to normal  Be able to perform HEP with minimal cueing required     Long Term Goals: 4 weeks  Increase range of motion to 75%to 100% of full  Increase strength 1 muscle grade  Improve scapular stabilization to normal  Restore ability to lie down without symptoms  Restore ability to look up without increased symptoms  Restore ability to reach turn head without increased symptoms  Restore ability to perform ADL's and household activities independently and without symptoms  Pt to be independent with HEP to improve ROM and independence with ADL's    Anticipated barriers to physical therapy: none  Pt's spiritual, cultural and educational needs considered and pt agreeable to plan of care and goals        Plan   Continue with established Plan of Care towards PT goals.    Liz De La Rosa, PT

## 2019-04-04 ENCOUNTER — CLINICAL SUPPORT (OUTPATIENT)
Dept: REHABILITATION | Facility: HOSPITAL | Age: 74
End: 2019-04-04
Attending: ANESTHESIOLOGY
Payer: MEDICARE

## 2019-04-04 DIAGNOSIS — M62.81 MUSCLE WEAKNESS: ICD-10-CM

## 2019-04-04 DIAGNOSIS — M54.12 CERVICAL RADICULOPATHY: Primary | ICD-10-CM

## 2019-04-04 PROCEDURE — 97110 THERAPEUTIC EXERCISES: CPT | Mod: PN | Performed by: PHYSICAL THERAPIST

## 2019-04-04 PROCEDURE — 97012 MECHANICAL TRACTION THERAPY: CPT | Mod: 59,PN | Performed by: PHYSICAL THERAPIST

## 2019-04-04 PROCEDURE — 97140 MANUAL THERAPY 1/> REGIONS: CPT | Mod: 59,PN | Performed by: PHYSICAL THERAPIST

## 2019-04-04 NOTE — PROGRESS NOTES
Physical Therapy Progress Note     Name: Eric Buitrago  Clinic Number: [id number]  Diagnosis:   Encounter Diagnoses   Name Primary?    Cervical radiculopathy Yes    Muscle weakness      Physician: Joe Hernandez MD    Treatment Orders: PT Eval and Treat      Past Medical History:   Diagnosis Date    Allergy     Hyperlipidemia     Hypertension     Hypothyroid     IBS (irritable bowel syndrome)      Medical Diagnosis: cervical DDD, cervical radiculopathy     Precautions: history of LBP  Evaluation date: 3/12/2019  Visit # authorized: 7/20  Authorization period: 12-31-19  Plan of care expiration: 5-6-19  MD Follow up appt: 4-11-19      Time In:  2:01  Time Out:  3:10  Total Billable Time:  60 min    Subjective     Pt reports: he is feeling better overall.  Pt states doing a lot of computer work taking breaks to move neck around.  Pt states tingling has been better overall  Pt states at night it takes a while for pain to start.  Response to previous treatment: was ok, no problems  Functional change: sleeping better    Location: left arm and neck    Pain Scale: before treatment: 0 with no tingling  currently; after treatment: 0 with no tingling    Objective       Cervical ROM: (measured in degrees sitting) 4-8-19  - flexion -  40                    - extension -  55                        - right side bending -  35        - left side bending -  40      - right rotation - 65  - left rotation - 65      Cervical ROM: (measured in degrees sitting) initial eval  - flexion -  35                    - extension -  55                        - right side bending -  25        - left side bending -  35      - right rotation - 60  - left rotation - 65    Continue with slight scapular instability noted with elevation and abduction in standing with increased winging, lateral rotation and protraction of the L scapula.       Shoulder ROM: full PROM B and active same as IE    Muscle Strength 4-8-19  MMT R L   Elbow flexion  5/5 5/5   Elbow extension 5/5 5/5   Shoulder flexion 5/5 5/5   Shoulder abduction 5/5 5/5   Shoulder external rotation 5/5 5/5   Shoulder internal rotation 5/5 5/5        Upper trap 5/5 5/5   Middle trap 3+/5 3+/5   Lower trap 3/5 3/5   Rhomboids 3+/5 3+/5             Muscle Strength initial eval  MMT R L   Elbow flexion 5/5 5/5   Elbow extension 5/5 5/5   Shoulder flexion 5/5 5/5   Shoulder abduction 5/5 5/5   Shoulder external rotation 5/5 5/5   Shoulder internal rotation 5/5 5/5           Upper trap 5/5 5/5   Middle trap 3/5 3/5   Lower trap 3-/5 3-/5   Rhomboids 3/5 3/5         Endurance is good at IE  fair     Special tests: Level pelvis at IE KS L pelvic positioning      Joint mobility: Mod tightness with mobility, Needs pillow to elevate head to avoid parasthesia and mod tightness L upper trap at IE mod-severe decreased mobility with P/A and side glide, Increased parasthesia with unsupported neck supine      Outcome measures:    FOTO neck disability index score: 66 at IE 63  PT reviewed FOTO scores for Eric Buitrago    FOTO scores were entered into EPIC - see media section.       TREATMENT  Therapeutic exercise: Eric received therapeutic exercises to develop strength, endurance, ROM, flexibility, posture and core stabilization for 35 minutes including:   Instructed pt to continue to use cervical roll to sleep to give more neck support with new pillow    Further discussed work out at gym, and machines he can use as adjunct to program, leida for scap stab    Chin tuck x 10  Shoulder shrug with ret x 10  Cervical flexion x 10 pain free zone  Cervical extension x 10 small plane pain free zone  Cervical SB R x 10 pain free zone, SB L small plane   Cervical rotation R  x 10 pain free zone  Rot L small plane  Shoulder extension prone x 2/10 with 2#  Horizontal abd prone x 2/10 with 1#  ER sidelying with 2# x 20      Cable cross pulldown with retraction with 7# x 20  (NP)Retraction with pulldown with YTB x  "20  Retraction with YTB x 20  ER with YTB x 20 with towel at side,     pect stretch x 5 x 3 planes    Modified push/pull  X 3    Verbal and tactile cueing provided for proper performance and recruitment.    Reviewed work out in gym Pt doing bicep curls free weight, tricep pull down, lat pull, chest press Instructed pt how to do protraction with chest press machine and talked about cable cross and retraction with pulldown Discussed using other machines such as pect machines and to see if can reverse to work on mid trap and rhomboids      Manual therapy techniques were performed to improve soft tissue mobility, decrease muscle tightness and pain to neck and upper trap L   for 10 minutes.   STM to neck and upper trap L with suboccipital release,      (NP)Kinesiotape to inhibit upper trap with 8" I strip. 8" Y strip to inhibit deltoid and 2 6" X strips to stimulate rhomboids was performed to decrease pain and improve functional use of UE.  Instructed pt in use, care and precautions with tape.  Instructed pt to leave on until Sunday if tape feeling ok with no itching and trim edges as needed     Cervical mechanical traction was performed to decompress spine and decrease muscle tightness and pain.  Traction was performed with a 30 second hold with a load of 26# and 10 second rest with a load of 13# for 15 minutes.       Written Home Exercises Provided: none today  Exercises were reviewed and Donaavn was able to demonstrate them prior to the end of the session.  Donavan demonstrated good  understanding of the education provided.     See EMR under Patient Instructions for exercises provided 3/18/2019.      Pt. education:  · Posture reeducation, body mechanics, HEP,   · No spiritual or educational barriers to learning provided  · Pt has no cultural, educational or language barriers to learning provided.    Assessment   Patient demonstrates improvement in range of motion, strength, stabilization and function.  Pt's is improving in " decreasing radicular symptoms in frequency and intensity and further ROM before symptoms start. Pt is working on trying to formulate some of scapula ex with gym equipment to perform together with Wellness program and I am coordinating with pt as he works with  to establish I HEP to be able to perform with gym equipment along with current program to help pt fully achieve goals.    Pt will benefit from manual therapy for soft tissue work and from mechanical traction for spinal decompression thus indicating need for 59 modifier  Donavan is progressing well towards his goals.   Pt prognosis is Good.     Pt will continue to benefit from skilled outpatient physical therapy to address the remaining functional deficits, provide pt/family education, and to maximize pt's level of independence in the home and community environment. .     GOALS:   Short Term Goals:  2 weeks MET STG's   Increase range of motion 25%  Increase strength 1/2 muscle grade  Increase postural awareness to normal  Be able to perform HEP with minimal cueing required     Long Term Goals: 4 weeks  Increase range of motion to 75%to 100% of full MET  Increase strength 1 muscle grade 50% improved  Improve scapular stabilization to normal 50% improved   Restore ability to lie down without symptoms 75% improved  Restore ability to look up without increased symptoms 75% improved  Restore ability to reach turn head without increased [id number]% improved  Restore ability to perform ADL's and household activities independently and without symptoms 75% improved  Pt to be independent with HEP to improve ROM and independence with ADL's 75% improved    Anticipated barriers to physical therapy: none  Pt's spiritual, cultural and educational needs considered and pt agreeable to plan of care and goals        Plan   If you concur, I recommend patient continue with physical therapy 2 times a week  for 4 weeks.  Please advise us of your  recommendations. Thank you for  allowing us to assist in the care of your patient.      Liz De La Rosa, MS, PT          I certify the need for these services furnished under this plan of treatment and while under my care.    ____________________________________ Physician/Referring Practitioner                                Date of Signature

## 2019-04-04 NOTE — PROGRESS NOTES
Physical Therapy Daily Note     Name: Eric Buitrago  Clinic Number: [id number]  Diagnosis:   Encounter Diagnoses   Name Primary?    Cervical radiculopathy Yes    Muscle weakness      Physician: Joe Hernandez MD    Treatment Orders: PT Eval and Treat      Past Medical History:   Diagnosis Date    Allergy     Hyperlipidemia     Hypertension     Hypothyroid     IBS (irritable bowel syndrome)      Medical Diagnosis: cervical DDD, cervical radiculopathy     Precautions: history of LBP  Evaluation date: 3/12/2019  Visit # authorized: 5/20  Authorization period: 12-31-19  Plan of care expiration: 4-9-18  MD Follow up appt: 4-11-19      Time In:  2:00  Time Out:  3:20  Total Billable Time:  70 min    Subjective     Pt reports: feeling better overall  Pt feels tape is helping Overall less frequency and less intensity overall  Pt states he used cervical roll in pillow and did help with sleep and able to wake up without increased pain as compared to night before  He was compliant with home exercise program.  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    Chin tuck x 10  Shoulder shrug with ret x 10  Cervical flexion x 10 pain free  "zone  Cervical extension x 10 small plane pain free zone  Cervical SB R x 10 pain free zone, SB L small plane   Cervical rotation R  x 10 pain free zone  Rot L small plane  Shoulder extension prone x 2/10 with 1#  Horizontal abd prone x 2/10    ER sidelying with 1# x 20       Cable cross pulldown with retraction with 7# x 20  (NP)Retraction with pulldown with YTB x 20  Retraction with YTB x 20  ER with YTB x 20 with towel at side, attempted ER with cable cross and too heavy at light weight     pect stretch x 5 x 3 planes    Modified push/pull  X 3    Verbal and tactile cueing provided for proper performance and recruitment.    Reviewed work out in gym Pt doing bicep curls free weight, tricep pull down, lat pull, chest press Instructed pt how to do protraction with chest press machine and talked about cable cross and retraction with pulldown Discussed using other machines such as pect machines and to see if can reverse to work on mid trap and rhomboids      Manual therapy techniques were performed to improve soft tissue mobility, decrease muscle tightness and pain to neck and upper trap L   for 10 minutes.   STM to neck and upper trap L with suboccipital release, pects, Min to 0 tightness in pects noted after stretching    (NP) still holding Kinesiotape to inhibit upper trap with 8" I strip. 8" Y strip to inhibit deltoid and 2 6" X strips to stimulate rhomboids was performed to decrease pain and improve functional use of UE.  Instructed pt in use, care and precautions with tape.  Instructed pt to leave on until Sunday if tape feeling ok with no itching and trim edges as needed     Cervical mechanical traction was performed to decompress spine and decrease muscle tightness and pain.  Traction was performed with a 30 second hold with a load of 26# and 10 second rest with a load of 13# for 15 minutes.       Written Home Exercises Provided: none today  Exercises were reviewed and Donavan was able to demonstrate them prior to " the end of the session.  Donavan demonstrated good  understanding of the education provided.     See EMR under Patient Instructions for exercises provided 3/18/2019.      Pt. education:  · Posture reeducation, body mechanics, HEP,   · No spiritual or educational barriers to learning provided  · Pt has no cultural, educational or language barriers to learning provided.    Assessment   Pt with further decreased frequency of tingling and decreased intensity, able to move neck more freely without onset of symptoms.  Pt appears to understand additional activities to look for with gym workout.    Pt will benefit from manual therapy for soft tissue work and from mechanical traction for spinal decompression thus indicating need for 59 modifier  Donavan is progressing well towards his goals.   Pt prognosis is Good.     Pt will continue to benefit from skilled outpatient physical therapy to address the remaining functional deficits, provide pt/family education, and to maximize pt's level of independence in the home and community environment. .     GOALS:   Short Term Goals:  2 weeks  Increase range of motion 25%  Increase strength 1/2 muscle grade  Increase postural awareness to normal  Be able to perform HEP with minimal cueing required     Long Term Goals: 4 weeks  Increase range of motion to 75%to 100% of full  Increase strength 1 muscle grade  Improve scapular stabilization to normal  Restore ability to lie down without symptoms  Restore ability to look up without increased symptoms  Restore ability to reach turn head without increased symptoms  Restore ability to perform ADL's and household activities independently and without symptoms  Pt to be independent with HEP to improve ROM and independence with ADL's    Anticipated barriers to physical therapy: none  Pt's spiritual, cultural and educational needs considered and pt agreeable to plan of care and goals        Plan   Continue with established Plan of Care towards PT goals.   Reassess next visit prior to MD jamal De La Rosa, PT

## 2019-04-08 ENCOUNTER — CLINICAL SUPPORT (OUTPATIENT)
Dept: REHABILITATION | Facility: HOSPITAL | Age: 74
End: 2019-04-08
Attending: ANESTHESIOLOGY
Payer: MEDICARE

## 2019-04-08 DIAGNOSIS — M54.12 CERVICAL RADICULOPATHY: Primary | ICD-10-CM

## 2019-04-08 DIAGNOSIS — M62.81 MUSCLE WEAKNESS: ICD-10-CM

## 2019-04-08 PROCEDURE — 97012 MECHANICAL TRACTION THERAPY: CPT | Mod: 59,PN | Performed by: PHYSICAL THERAPIST

## 2019-04-08 PROCEDURE — 97110 THERAPEUTIC EXERCISES: CPT | Mod: PN | Performed by: PHYSICAL THERAPIST

## 2019-04-08 PROCEDURE — 97140 MANUAL THERAPY 1/> REGIONS: CPT | Mod: 59,PN | Performed by: PHYSICAL THERAPIST

## 2019-04-08 NOTE — PLAN OF CARE
Physical Therapy Progress Note     Name: Eric Buitrago  Clinic Number: [id number]  Diagnosis:   Encounter Diagnoses   Name Primary?    Cervical radiculopathy Yes    Muscle weakness      Physician: Joe Hernandez MD    Treatment Orders: PT Eval and Treat      Past Medical History:   Diagnosis Date    Allergy     Hyperlipidemia     Hypertension     Hypothyroid     IBS (irritable bowel syndrome)      Medical Diagnosis: cervical DDD, cervical radiculopathy     Precautions: history of LBP  Evaluation date: 3/12/2019  Visit # authorized: 7/20  Authorization period: 12-31-19  Plan of care expiration: 5-6-19  MD Follow up appt: 4-11-19      Time In:  2:01  Time Out:  3:10  Total Billable Time:  60 min    Subjective     Pt reports: he is feeling better overall.  Pt states doing a lot of computer work taking breaks to move neck around.  Pt states tingling has been better overall  Pt states at night it takes a while for pain to start.  Response to previous treatment: was ok, no problems  Functional change: sleeping better    Location: left arm and neck    Pain Scale: before treatment: 0 with no tingling  currently; after treatment: 0 with no tingling    Objective       Cervical ROM: (measured in degrees sitting) 4-8-19  - flexion -  40                    - extension -  55                        - right side bending -  35        - left side bending -  40      - right rotation - 65  - left rotation - 65      Cervical ROM: (measured in degrees sitting) initial eval  - flexion -  35                    - extension -  55                        - right side bending -  25        - left side bending -  35      - right rotation - 60  - left rotation - 65    Continue with slight scapular instability noted with elevation and abduction in standing with increased winging, lateral rotation and protraction of the L scapula.       Shoulder ROM: full PROM B and active same as IE    Muscle Strength 4-8-19  MMT R L   Elbow flexion  5/5 5/5   Elbow extension 5/5 5/5   Shoulder flexion 5/5 5/5   Shoulder abduction 5/5 5/5   Shoulder external rotation 5/5 5/5   Shoulder internal rotation 5/5 5/5        Upper trap 5/5 5/5   Middle trap 3+/5 3+/5   Lower trap 3/5 3/5   Rhomboids 3+/5 3+/5             Muscle Strength initial eval  MMT R L   Elbow flexion 5/5 5/5   Elbow extension 5/5 5/5   Shoulder flexion 5/5 5/5   Shoulder abduction 5/5 5/5   Shoulder external rotation 5/5 5/5   Shoulder internal rotation 5/5 5/5           Upper trap 5/5 5/5   Middle trap 3/5 3/5   Lower trap 3-/5 3-/5   Rhomboids 3/5 3/5         Endurance is good at IE  fair     Special tests: Level pelvis at IE ME L pelvic positioning      Joint mobility: Mod tightness with mobility, Needs pillow to elevate head to avoid parasthesia and mod tightness L upper trap at IE mod-severe decreased mobility with P/A and side glide, Increased parasthesia with unsupported neck supine      Outcome measures:    FOTO neck disability index score: 66 at IE 63  PT reviewed FOTO scores for Eric Buitrago    FOTO scores were entered into EPIC - see media section.       TREATMENT  Therapeutic exercise: Eric received therapeutic exercises to develop strength, endurance, ROM, flexibility, posture and core stabilization for 35 minutes including:   Instructed pt to continue to use cervical roll to sleep to give more neck support with new pillow    Further discussed work out at gym, and machines he can use as adjunct to program, leida for scap stab    Chin tuck x 10  Shoulder shrug with ret x 10  Cervical flexion x 10 pain free zone  Cervical extension x 10 small plane pain free zone  Cervical SB R x 10 pain free zone, SB L small plane   Cervical rotation R  x 10 pain free zone  Rot L small plane  Shoulder extension prone x 2/10 with 2#  Horizontal abd prone x 2/10 with 1#  ER sidelying with 2# x 20      Cable cross pulldown with retraction with 7# x 20  (NP)Retraction with pulldown with YTB x  "20  Retraction with YTB x 20  ER with YTB x 20 with towel at side,     pect stretch x 5 x 3 planes    Modified push/pull  X 3    Verbal and tactile cueing provided for proper performance and recruitment.    Reviewed work out in gym Pt doing bicep curls free weight, tricep pull down, lat pull, chest press Instructed pt how to do protraction with chest press machine and talked about cable cross and retraction with pulldown Discussed using other machines such as pect machines and to see if can reverse to work on mid trap and rhomboids      Manual therapy techniques were performed to improve soft tissue mobility, decrease muscle tightness and pain to neck and upper trap L   for 10 minutes.   STM to neck and upper trap L with suboccipital release,      (NP)Kinesiotape to inhibit upper trap with 8" I strip. 8" Y strip to inhibit deltoid and 2 6" X strips to stimulate rhomboids was performed to decrease pain and improve functional use of UE.  Instructed pt in use, care and precautions with tape.  Instructed pt to leave on until Sunday if tape feeling ok with no itching and trim edges as needed     Cervical mechanical traction was performed to decompress spine and decrease muscle tightness and pain.  Traction was performed with a 30 second hold with a load of 26# and 10 second rest with a load of 13# for 15 minutes.       Written Home Exercises Provided: none today  Exercises were reviewed and Donavan was able to demonstrate them prior to the end of the session.  Donavan demonstrated good  understanding of the education provided.     See EMR under Patient Instructions for exercises provided 3/18/2019.      Pt. education:  · Posture reeducation, body mechanics, HEP,   · No spiritual or educational barriers to learning provided  · Pt has no cultural, educational or language barriers to learning provided.    Assessment   Patient demonstrates improvement in range of motion, strength, stabilization and function.  Pt's is improving in " decreasing radicular symptoms in frequency and intensity and further ROM before symptoms start. Pt is working on trying to formulate some of scapula ex with gym equipment to perform together with Wellness program and I am coordinating with pt as he works with  to establish I HEP to be able to perform with gym equipment along with current program to help pt fully achieve goals.    Pt will benefit from manual therapy for soft tissue work and from mechanical traction for spinal decompression thus indicating need for 59 modifier  Donavan is progressing well towards his goals.   Pt prognosis is Good.     Pt will continue to benefit from skilled outpatient physical therapy to address the remaining functional deficits, provide pt/family education, and to maximize pt's level of independence in the home and community environment. .     GOALS:   Short Term Goals:  2 weeks MET STG's   Increase range of motion 25%  Increase strength 1/2 muscle grade  Increase postural awareness to normal  Be able to perform HEP with minimal cueing required     Long Term Goals: 4 weeks  Increase range of motion to 75%to 100% of full MET  Increase strength 1 muscle grade 50% improved  Improve scapular stabilization to normal 50% improved   Restore ability to lie down without symptoms 75% improved  Restore ability to look up without increased symptoms 75% improved  Restore ability to reach turn head without increased [id number]% improved  Restore ability to perform ADL's and household activities independently and without symptoms 75% improved  Pt to be independent with HEP to improve ROM and independence with ADL's 75% improved    Anticipated barriers to physical therapy: none  Pt's spiritual, cultural and educational needs considered and pt agreeable to plan of care and goals        Plan   If you concur, I recommend patient continue with physical therapy 2 times a week  for 4 weeks.  Please advise us of your  recommendations. Thank you for  allowing us to assist in the care of your patient.      Liz De La Rosa, MS, PT          I certify the need for these services furnished under this plan of treatment and while under my care.    ____________________________________ Physician/Referring Practitioner                                Date of Signature

## 2019-04-11 ENCOUNTER — OFFICE VISIT (OUTPATIENT)
Dept: PAIN MEDICINE | Facility: CLINIC | Age: 74
End: 2019-04-11
Payer: MEDICARE

## 2019-04-11 ENCOUNTER — CLINICAL SUPPORT (OUTPATIENT)
Dept: REHABILITATION | Facility: HOSPITAL | Age: 74
End: 2019-04-11
Attending: ANESTHESIOLOGY
Payer: MEDICARE

## 2019-04-11 VITALS
HEART RATE: 81 BPM | OXYGEN SATURATION: 97 % | BODY MASS INDEX: 24.75 KG/M2 | TEMPERATURE: 97 F | SYSTOLIC BLOOD PRESSURE: 136 MMHG | DIASTOLIC BLOOD PRESSURE: 72 MMHG | WEIGHT: 177.5 LBS | RESPIRATION RATE: 18 BRPM

## 2019-04-11 DIAGNOSIS — M50.30 DDD (DEGENERATIVE DISC DISEASE), CERVICAL: Primary | ICD-10-CM

## 2019-04-11 DIAGNOSIS — M62.81 MUSCLE WEAKNESS: ICD-10-CM

## 2019-04-11 DIAGNOSIS — M54.12 CERVICAL RADICULOPATHY: ICD-10-CM

## 2019-04-11 DIAGNOSIS — M54.12 CERVICAL RADICULOPATHY: Primary | ICD-10-CM

## 2019-04-11 PROCEDURE — 99213 OFFICE O/P EST LOW 20 MIN: CPT | Mod: S$PBB,,, | Performed by: ANESTHESIOLOGY

## 2019-04-11 PROCEDURE — 99213 PR OFFICE/OUTPT VISIT, EST, LEVL III, 20-29 MIN: ICD-10-PCS | Mod: S$PBB,,, | Performed by: ANESTHESIOLOGY

## 2019-04-11 PROCEDURE — 99213 OFFICE O/P EST LOW 20 MIN: CPT | Mod: PBBFAC,PN | Performed by: ANESTHESIOLOGY

## 2019-04-11 PROCEDURE — 97110 THERAPEUTIC EXERCISES: CPT | Mod: PN | Performed by: PHYSICAL THERAPIST

## 2019-04-11 PROCEDURE — 97012 MECHANICAL TRACTION THERAPY: CPT | Mod: PN | Performed by: PHYSICAL THERAPIST

## 2019-04-11 PROCEDURE — 99999 PR PBB SHADOW E&M-EST. PATIENT-LVL III: CPT | Mod: PBBFAC,,, | Performed by: ANESTHESIOLOGY

## 2019-04-11 PROCEDURE — 99999 PR PBB SHADOW E&M-EST. PATIENT-LVL III: ICD-10-PCS | Mod: PBBFAC,,, | Performed by: ANESTHESIOLOGY

## 2019-04-11 NOTE — PROGRESS NOTES
This note was completed with dictation software and grammatical errors may exist.    CC:  Left arm numbness    HPI:  The patient is a 73-year-old man with a history of hypertension, IBS who presents in referral from Dr. Gastelum for left arm tingling and numbness.  He returns in follow-up today, since I had last seen him he has been doing physical therapy with stretching, strengthening exercises.  He also reports that he has changed his pillow to be something that is more firm, previously he was having issues when laying down at night, if he turned his head a certain way he would immediately have the numbness and tingling in his left arm.  That is no longer occurring.  He does have some episodes throughout the day that lasted very briefly but the frequency is much less.  He also has been taking gabapentin 300 mg nightly, denies any side effects with this.  Overall he is greatly improved any cannot necessarily determine which intervention may have brought about the improvements.  He denies the feeling of any weakness in his left arm, however he is right handed.      ROS:  He reports joint stiffness, joint pain, difficulty sleeping.  Balance of review of systems is negative.    Past Medical History:   Diagnosis Date    Allergy     Hyperlipidemia     Hypertension     Hypothyroid     IBS (irritable bowel syndrome)        Past Surgical History:   Procedure Laterality Date    CHOLECYSTECTOMY  2007    COLONOSCOPY  ~2007    [facility].    COLONOSCOPY N/A 2/21/2018    Performed by Josue Myles Jr., MD at [facility] END[facility]       Social History     Socioeconomic History    Marital status:      Spouse name: Not on file    Number of children: Not on file    Years of education: Not on file    Highest education level: Not on file   Occupational History    Occupation: retired school psycholgist   Social Needs    Financial resource strain: Not on file    Food insecurity:     Worry: Not on file     Inability: Not on file     Transportation needs:     Medical: Not on file     Non-medical: Not on file   Tobacco Use    Smoking status: Former Smoker     Last attempt to quit: 1983     Years since quittin.7    Smokeless tobacco: Never Used   Substance and Sexual Activity    Alcohol use: Yes     Comment: occ    Drug use: No    Sexual activity: Not on file   Lifestyle    Physical activity:     Days per week: Not on file     Minutes per session: Not on file    Stress: Not on file   Relationships    Social connections:     Talks on phone: Not on file     Gets together: Not on file     Attends [employer] service: Not on file     Active member of club or organization: Not on file     Attends meetings of clubs or organizations: Not on file     Relationship status: Not on file   Other Topics Concern    Not on file   Social History Narrative    Not on file         Medications/Allergies: See med card    Vitals:    19 0944   BP: 136/72   Pulse: 81   Resp: 18   Temp: 97 °F (36.1 °C)   TempSrc: Oral   SpO2: 97%   Weight: 80.5 kg (177 lb 7.5 oz)   PainSc:   2   PainLoc: Neck         Physical exam:  Gen: A and O x3, pleasant, well-groomed  Skin: No rashes or obvious lesions  HEENT: PERRLA, no obvious deformities on ears or in canals.Trachea midline.  CVS: Regular rate and rhythm, normal palpable pulses.  Resp: Clear to auscultation bilaterally, no wheezes or rales.  Abdomen: Soft, NT/ND.  Musculoskeletal:  No antalgic gait.     Neuro:  Upper extremities: 5/5 strength bilaterally, except for 4/5 interossei finger extension   Reflexes: Brachioradialis 2+, Bicep 2+, Tricep 2+.   Sensory: Intact and symmetrical to light touch and pinprick in C2-T1 dermatomes bilaterally.    Cervical Spine:  Cervical spine:  Range of motion is mildly reduced with lateral rotation of the right with no increased pain, moderately reduced with lateral rotation to the left with numbness and tingling occurring with oblique extension to the left.  Spurling's  maneuver causes left arm numbness and tingling when turning to the left side.  Myofascial exam: No Tenderness to palpation across cervical paraspinous region bilaterally.      Imagin19 C-spine  C2-C3: Minimal disc osteophyte complex.  Mild-moderate left and minimal right facet hypertrophy.  No significant spinal canal or foraminal narrowing.  C3-C4: Mild disc osteophyte complex.  Mild bilateral facet hypertrophy.  Preserved ventral and dorsal CSF.  Minimal bilateral foraminal narrowing.  C4-C5: Mild disc osteophyte complex with prominent right uncovertebral hypertrophy.  Moderate right and mild-moderate left facet hypertrophy.  Mild ligamentum flavum thickening.  Slight ventral and dorsal cord flattening with preserved ventral and dorsal CSF.  Moderate right and minimal left foraminal narrowing.  C5-C6: Retrolisthesis.  Moderate disc osteophyte complex.  Mild bilateral facet hypertrophy.  Mild ligamentum flavum thickening.  Mild ventral cord flattening with effacement of ventral and dorsal CSF.  Moderate-severe bilateral foraminal narrowing.  C6-C7: Anterolisthesis.  Unroofing of the disc.  Moderate bilateral facet hypertrophy.  Preserved ventral and dorsal CSF.  No significant foraminal narrowing.  C7-T1: Anterolisthesis.  Mild unroofing of the disc.  Mild ligamentum flavum thickening.  Moderate bilateral facet hypertrophy.  Preserved ventral and dorsal CSF.  Minimal left foraminal narrowing.      Assessment:   The patient is a 73-year-old man with a history of hypertension, IBS who presents in referral from Dr. Gastelum for left arm tingling and numbness.    1. DDD (degenerative disc disease), cervical     2. Cervical radiculopathy       Plan:  1.  I do detect slight weakness in his left hand that I did not necessarily detect last time but it is very subtle.  We discussed that he needs to keep an eye on this to make sure this does not worsen.  Otherwise his symptoms of numbness and tingling are greatly  improved.  He has been doing physical therapy and I encouraged him to continue the exercises.  He states that he is too busy with work to continue attending PT but assures me that he will continue doing the exercises on his own.  2.  As far as the gabapentin, I will have him stop this, given a week to see if it is actually providing any benefit, if his symptoms do not worsen he will discontinue.  Otherwise he may choose to restart it and I can send refills for him.  He is going to follow up with me as needed for now.

## 2019-04-11 NOTE — PROGRESS NOTES
Physical Therapy Daily Note     Name: Eric Buitrago  Clinic Number: [id number]  Diagnosis:   Encounter Diagnoses   Name Primary?    Cervical radiculopathy Yes    Muscle weakness      Physician: Joe Hernandez MD    Treatment Orders: PT Eval and Treat      Past Medical History:   Diagnosis Date    Allergy     Hyperlipidemia     Hypertension     Hypothyroid     IBS (irritable bowel syndrome)      Medical Diagnosis: cervical DDD, cervical radiculopathy     Precautions: history of LBP  Evaluation date: 3/12/2019  Visit # authorized: 8/20  Authorization period: 12-31-19  Plan of care expiration: 5-6-19  MD Follow up appt: 4-11-19      Time In:  1:55  Time Out:  3:00  Total Billable Time:  40 min    Subjective     Pt reports: he saw MD and he recommended PT for one more week and further reviewed gym equipment  Response to previous treatment: was ok, no problems  Functional change: sleeping better    Location: left arm and neck    Pain Scale: before treatment: 0 with no tingling  currently; after treatment: 0 with no tingling    Objective       Cervical ROM: (measured in degrees sitting) 4-8-19  - flexion -  40                    - extension -  55                        - right side bending -  35        - left side bending -  40      - right rotation - 65  - left rotation - 65      Cervical ROM: (measured in degrees sitting) initial eval  - flexion -  35                    - extension -  55                        - right side bending -  25        - left side bending -  35      - right rotation - 60  - left rotation - 65    Continue with slight scapular instability noted with elevation and abduction in standing with increased winging, lateral rotation and protraction of the L scapula.       Shoulder ROM: full PROM B and active same as IE    Muscle Strength 4-8-19  MMT R L   Elbow flexion 5/5 5/5   Elbow extension 5/5 5/5   Shoulder flexion 5/5 5/5   Shoulder abduction 5/5 5/5   Shoulder external rotation 5/5  5/5   Shoulder internal rotation 5/5 5/5        Upper trap 5/5 5/5   Middle trap 3+/5 3+/5   Lower trap 3/5 3/5   Rhomboids 3+/5 3+/5             Muscle Strength initial eval  MMT R L   Elbow flexion 5/5 5/5   Elbow extension 5/5 5/5   Shoulder flexion 5/5 5/5   Shoulder abduction 5/5 5/5   Shoulder external rotation 5/5 5/5   Shoulder internal rotation 5/5 5/5           Upper trap 5/5 5/5   Middle trap 3/5 3/5   Lower trap 3-/5 3-/5   Rhomboids 3/5 3/5         Endurance is good at IE  fair     Special tests: Level pelvis at IE MO L pelvic positioning      Joint mobility: Mod tightness with mobility, Needs pillow to elevate head to avoid parasthesia and mod tightness L upper trap at IE mod-severe decreased mobility with P/A and side glide, Increased parasthesia with unsupported neck supine      Outcome measures:    FOTO neck disability index score: 66 at IE 63  PT reviewed FOTO scores for Eric Buitrago    FOTO scores were entered into EP[employer] - see media section.       TREATMENT  Therapeutic exercise: Eric received therapeutic exercises to develop strength, endurance, ROM, flexibility, posture and core stabilization for 25 minutes including:   Instructed pt to continue to use cervical roll to sleep to give more neck support with new pillow    Further discussed work out at gym, and machines he can use as adjunct to program, leida for scap stab    Chin tuck x 10  Shoulder shrug with ret x 10  Cervical flexion x 10 pain free zone  Cervical extension x 10 small plane pain free zone  Cervical SB R x 10 pain free zone, SB L small plane   Cervical rotation R  x 10 pain free zone  Rot L small plane  Shoulder extension prone x 2/10 with 2#  Horizontal abd prone x 2/10 with 1#  ER sidelying with 2# x 20      Cable cross pulldown with retraction with 7# x 20  (NP)Retraction with pulldown with YTB x 20  Retraction with YTB x 20  ER with cable cross x 3# x 10    pect stretch x 5 x 3 planes    Modified push/pull  X 3    Verbal  "and tactile cueing provided for proper performance and recruitment.    Reviewed work out in gym after meeting with  at gym and will have cable weights to work on, but did not check out fully due to time constraints, but will do so before next visit.      (NP)Manual therapy techniques were performed to improve soft tissue mobility, decrease muscle tightness and pain to neck and upper trap L   for 0 minutes.   STM to neck and upper trap L with suboccipital release,      (NP)Kinesiotape to inhibit upper trap with 8" I strip. 8" Y strip to inhibit deltoid and 2 6" X strips to stimulate rhomboids was performed to decrease pain and improve functional use of UE.  Instructed pt in use, care and precautions with tape.  Instructed pt to leave on until Sunday if tape feeling ok with no itching and trim edges as needed     Cervical mechanical traction was performed to decompress spine and decrease muscle tightness and pain.  Traction was performed with a 30 second hold with a load of 26# and 10 second rest with a load of 13# for 15 minutes.       Written Home Exercises Provided: none today  Exercises were reviewed and Donavan was able to demonstrate them prior to the end of the session.  Donavan demonstrated good  understanding of the education provided.     See EMR under Patient Instructions for exercises provided 3/18/2019.      Pt. education:  · Posture reeducation, body mechanics, HEP,   · No spiritual or educational barriers to learning provided  · Pt has no cultural, educational or language barriers to learning provided.    Assessment   Patient has more activities to do at gym for scap strengthening to enhance HEP   Pt will benefit from manual therapy for soft tissue work and from mechanical traction for spinal decompression thus indicating need for 59 modifier  Donavan is progressing well towards his goals.   Pt prognosis is Good.     Pt will continue to benefit from skilled outpatient physical therapy to address the " remaining functional deficits, provide pt/family education, and to maximize pt's level of independence in the home and community environment. .     GOALS:   Short Term Goals:  2 weeks MET STG's   Increase range of motion 25%  Increase strength 1/2 muscle grade  Increase postural awareness to normal  Be able to perform HEP with minimal cueing required     Long Term Goals: 4 weeks  Increase range of motion to 75%to 100% of full MET  Increase strength 1 muscle grade 50% improved  Improve scapular stabilization to normal 50% improved   Restore ability to lie down without symptoms 75% improved  Restore ability to look up without increased symptoms 75% improved  Restore ability to reach turn head without increased [id number]% improved  Restore ability to perform ADL's and household activities independently and without symptoms 75% improved  Pt to be independent with HEP to improve ROM and independence with ADL's 75% improved    Anticipated barriers to physical therapy: none  Pt's spiritual, cultural and educational needs considered and pt agreeable to plan of care and goals        Plan   Continue with established plan of care towards PT goals.  See next week, will assess on Tues and if pt feels ready will try on I HEP keeping POC open to exp date 5-6-19

## 2019-04-16 ENCOUNTER — CLINICAL SUPPORT (OUTPATIENT)
Dept: REHABILITATION | Facility: HOSPITAL | Age: 74
End: 2019-04-16
Attending: ANESTHESIOLOGY
Payer: MEDICARE

## 2019-04-16 DIAGNOSIS — M54.12 CERVICAL RADICULOPATHY: Primary | ICD-10-CM

## 2019-04-16 DIAGNOSIS — M62.81 MUSCLE WEAKNESS: ICD-10-CM

## 2019-04-16 PROCEDURE — 97110 THERAPEUTIC EXERCISES: CPT | Mod: PN | Performed by: PHYSICAL THERAPIST

## 2019-04-16 PROCEDURE — 97012 MECHANICAL TRACTION THERAPY: CPT | Mod: PN | Performed by: PHYSICAL THERAPIST

## 2019-04-16 NOTE — PROGRESS NOTES
Physical Therapy Progress Note     Name: Eric Buitrago  Clinic Number: [id number]  Diagnosis:   Encounter Diagnoses   Name Primary?    Cervical radiculopathy Yes    Muscle weakness      Physician: Joe Hernandez MD    Treatment Orders: PT Eval and Treat      Past Medical History:   Diagnosis Date    Allergy     Hyperlipidemia     Hypertension     Hypothyroid     IBS (irritable bowel syndrome)      Medical Diagnosis: cervical DDD, cervical radiculopathy     Precautions: history of LBP  Evaluation date: 3/12/2019  Visit # authorized: 9/20  Authorization period: 12-31-19  Plan of care expiration: 5-6-19  MD Follow up appt: 4-11-19      Time In:  2:00  Time Out:  3:03  Total Billable Time:  30 min    Subjective     Pt reports: he still unable to stay turned to L due to onset of radicular symptoms   Response to previous treatment: was ok, no problems  Functional change: sleeping better    Location: left arm and neck    Pain Scale: before treatment: 0 with no tingling  currently; after treatment: 0 with no tingling    Objective       Cervical ROM: (measured in degrees sitting) 4-8-19  - flexion -  40                    - extension -  55                        - right side bending -  35        - left side bending -  40      - right rotation - 65  - left rotation - 65      Cervical ROM: (measured in degrees sitting) initial eval  - flexion -  35                    - extension -  55                        - right side bending -  25        - left side bending -  35      - right rotation - 60  - left rotation - 65    Continue with slight scapular instability noted with elevation and abduction in standing with increased winging, lateral rotation and protraction of the L scapula.       Shoulder ROM: full PROM B and active same as IE    Muscle Strength 4-8-19  MMT R L   Elbow flexion 5/5 5/5   Elbow extension 5/5 5/5   Shoulder flexion 5/5 5/5   Shoulder abduction 5/5 5/5   Shoulder external rotation 5/5 5/5    Shoulder internal rotation 5/5 5/5        Upper trap 5/5 5/5   Middle trap 3+/5 3+/5   Lower trap 3/5 3/5   Rhomboids 3+/5 3+/5             Muscle Strength initial eval  MMT R L   Elbow flexion 5/5 5/5   Elbow extension 5/5 5/5   Shoulder flexion 5/5 5/5   Shoulder abduction 5/5 5/5   Shoulder external rotation 5/5 5/5   Shoulder internal rotation 5/5 5/5           Upper trap 5/5 5/5   Middle trap 3/5 3/5   Lower trap 3-/5 3-/5   Rhomboids 3/5 3/5         Endurance is good at IE  fair     Special tests: Level pelvis at IE NC L pelvic positioning      Joint mobility: Mod tightness with mobility, Needs pillow to elevate head to avoid parasthesia and mod tightness L upper trap at IE mod-severe decreased mobility with P/A and side glide, Increased parasthesia with unsupported neck supine      Outcome measures:    FOTO neck disability index score: 66 at IE 63  PT reviewed FOTO scores for Eric Buitrago    FOTO scores were entered into EPIC - see media section.       TREATMENT  Therapeutic exercise: Eric received therapeutic exercises to develop strength, endurance, ROM, flexibility, posture and core stabilization for 10 direct minutes including:   Instructed pt to continue to use cervical roll to sleep to give more neck support with new pillow    Further discussed work out at gym, and machines he can use as adjunct to program, leida for scap stab and instructed pt to continue with prone exercises at least for another month and then can continue gym workout     Chin tuck x 10  Shoulder shrug with ret x 10  Cervical flexion x 10 pain free zone  Cervical extension x 10 small plane pain free zone  Cervical SB R x 10 pain free zone, SB L small plane   Cervical rotation R  x 10 pain free zone  Rot L small plane  Shoulder extension prone x 2/10 with 2#  Horizontal abd prone x 2/10 with 1#  ER sidelying with 2# x 20      Cable cross pulldown with retraction with 7# x 20  (NP)Retraction with pulldown with YTB x 20  Retraction  "with YTB x 20  ER with cable cross x 3# x 10    pect stretch x 5 x 3 planes    Modified push/pull  X 3    Verbal and tactile cueing provided for proper performance and recruitment.    Reviewed work out in gym after meeting with  at gym and will have cable weights to work on, but did not check out fully due to time constraints, but will do so before next visit.      Manual therapy techniques were performed to improve soft tissue mobility, decrease muscle tightness and pain to neck and upper trap L   for 5 minutes.   STM to neck and upper trap L with suboccipital release,      (NP)Kinesiotape to inhibit upper trap with 8" I strip. 8" Y strip to inhibit deltoid and 2 6" X strips to stimulate rhomboids was performed to decrease pain and improve functional use of UE.  Instructed pt in use, care and precautions with tape.  Instructed pt to leave on until Sunday if tape feeling ok with no itching and trim edges as needed     Cervical mechanical traction was performed to decompress spine and decrease muscle tightness and pain.  Traction was performed with a 30 second hold with a load of 26# and 10 second rest with a load of 13# for 15 minutes.       Written Home Exercises Provided: none today  Exercises were reviewed and Donavan was able to demonstrate them prior to the end of the session.  Donavan demonstrated good  understanding of the education provided.     See EMR under Patient Instructions for exercises provided 3/18/2019.      Pt. education:  · Posture reeducation, body mechanics, HEP,   · No spiritual or educational barriers to learning provided  · Pt has no cultural, educational or language barriers to learning provided.    Assessment   Pt feels comfortable with gym equipment and will focus on I HEP and understands to return prior to 5-6-19 as needed.    Donavan is progressing well towards his goals.   Pt prognosis is Good.     Pt will continue to benefit from skilled outpatient physical therapy to address the remaining " functional deficits, provide pt/family education, and to maximize pt's level of independence in the home and community environment. .     GOALS:   Short Term Goals:  2 weeks MET STG's   Increase range of motion 25%  Increase strength 1/2 muscle grade  Increase postural awareness to normal  Be able to perform HEP with minimal cueing required     Long Term Goals: 4 weeks  Increase range of motion to 75%to 100% of full MET  Increase strength 1 muscle grade 50% improved  Improve scapular stabilization to normal 50% improved   Restore ability to lie down without symptoms 75% improved  Restore ability to look up without increased symptoms 75% improved  Restore ability to reach turn head without increased [id number]% improved  Restore ability to perform ADL's and household activities independently and without symptoms 75% improved  Pt to be independent with HEP to improve ROM and independence with ADL's 75% improved    Anticipated barriers to physical therapy: none  Pt's spiritual, cultural and educational needs considered and pt agreeable to plan of care and goals        Plan   Pt to continue with HEP and Wellness program I and will call if needed before 5-6 and then will discharge after 5-6

## 2019-04-30 RX ORDER — GABAPENTIN 300 MG/1
CAPSULE ORAL
Qty: 30 CAPSULE | Refills: 0 | Status: SHIPPED | OUTPATIENT
Start: 2019-04-30 | End: 2019-05-27 | Stop reason: SDUPTHER

## 2019-05-10 ENCOUNTER — DOCUMENTATION ONLY (OUTPATIENT)
Dept: REHABILITATION | Facility: HOSPITAL | Age: 74
End: 2019-05-10

## 2019-05-10 DIAGNOSIS — M62.81 MUSCLE WEAKNESS: ICD-10-CM

## 2019-05-10 DIAGNOSIS — M54.12 CERVICAL RADICULOPATHY: Primary | ICD-10-CM

## 2019-05-10 NOTE — PROGRESS NOTES
Physical Therapy Discharge Note      Name: Eric Buitrago  Clinic Number: [id number]  Diagnosis:        Encounter Diagnoses   Name Primary?    Cervical radiculopathy Yes    Muscle weakness        Physician: Joe Hernandez MD     Treatment Orders: PT Eval and Treat             Past Medical History:   Diagnosis Date    Allergy      Hyperlipidemia      Hypertension      Hypothyroid      IBS (irritable bowel syndrome)        Medical Diagnosis: cervical DDD, cervical radiculopathy     Precautions: history of LBP  Evaluation date: 3/12/2019  Visit # authorized: 9/20  Authorization period: 12-31-19  Plan of care expiration: 5-6-19  MD Follow up appt: 4-11-19        Subjective      Pt reports: he still unable to stay turned to L due to onset of radicular symptoms   Response to previous treatment: was ok, no problems  Functional change: sleeping better     Location: left arm and neck     Pain Scale: before treatment: 0 with no tingling  currently; after treatment: 0 with no tingling     Objective         Cervical ROM: (measured in degrees sitting) 4-8-19  - flexion -  40                    - extension -  55                        - right side bending -  35        - left side bending -  40      - right rotation - 65  - left rotation - 65        Cervical ROM: (measured in degrees sitting) initial eval  - flexion -  35                    - extension -  55                        - right side bending -  25        - left side bending -  35      - right rotation - 60  - left rotation - 65     Continue with slight scapular instability noted with elevation and abduction in standing with increased winging, lateral rotation and protraction of the L scapula.       Shoulder ROM: full PROM B and active same as IE     Muscle Strength 4-8-19  MMT R L   Elbow flexion 5/5 5/5   Elbow extension 5/5 5/5   Shoulder flexion 5/5 5/5   Shoulder abduction 5/5 5/5   Shoulder external rotation 5/5 5/5   Shoulder internal rotation 5/5 5/5            Upper trap 5/5 5/5   Middle trap 3+/5 3+/5   Lower trap 3/5 3/5   Rhomboids 3+/5 3+/5               Muscle Strength initial eval  MMT R L   Elbow flexion 5/5 5/5   Elbow extension 5/5 5/5   Shoulder flexion 5/5 5/5   Shoulder abduction 5/5 5/5   Shoulder external rotation 5/5 5/5   Shoulder internal rotation 5/5 5/5           Upper trap 5/5 5/5   Middle trap 3/5 3/5   Lower trap 3-/5 3-/5   Rhomboids 3/5 3/5         Endurance is good at IE  fair     Special tests: Level pelvis at IE NC L pelvic positioning      Joint mobility: Mod tightness with mobility, Needs pillow to elevate head to avoid parasthesia and mod tightness L upper trap at IE mod-severe decreased mobility with P/A and side glide, Increased parasthesia with unsupported neck supine      Outcome measures:    FOTO neck disability index score: 66 at IE 63  PT reviewed FOTO scores for Eric Buitrago    FOTO scores were entered into [employer] - see media section.        TREATMENT  Therapeutic exercise: Eric received therapeutic exercises to develop strength, endurance, ROM, flexibility, posture and core stabilization for 10 direct minutes including:   Instructed pt to continue to use cervical roll to sleep to give more neck support with new pillow     Further discussed work out at gym, and machines he can use as adjunct to program, leida for scap stab and instructed pt to continue with prone exercises at least for another month and then can continue gym workout      Chin tuck x 10  Shoulder shrug with ret x 10  Cervical flexion x 10 pain free zone  Cervical extension x 10 small plane pain free zone  Cervical SB R x 10 pain free zone, SB L small plane   Cervical rotation R  x 10 pain free zone  Rot L small plane  Shoulder extension prone x 2/10 with 2#  Horizontal abd prone x 2/10 with 1#  ER sidelying with 2# x 20        Cable cross pulldown with retraction with 7# x 20  (NP)Retraction with pulldown with YTB x 20  Retraction with YTB x 20  ER with  "cable cross x 3# x 10     pect stretch x 5 x 3 planes     Modified push/pull  X 3     Verbal and tactile cueing provided for proper performance and recruitment.     Reviewed work out in gym after meeting with  at gym and will have cable weights to work on, but did not check out fully due to time constraints, but will do so before next visit.       Manual therapy techniques were performed to improve soft tissue mobility, decrease muscle tightness and pain to neck and upper trap L   for 5 minutes.   STM to neck and upper trap L with suboccipital release,       (NP)Kinesiotape to inhibit upper trap with 8" I strip. 8" Y strip to inhibit deltoid and 2 6" X strips to stimulate rhomboids was performed to decrease pain and improve functional use of UE.  Instructed pt in use, care and precautions with tape.  Instructed pt to leave on until Sunday if tape feeling ok with no itching and trim edges as needed     Cervical mechanical traction was performed to decompress spine and decrease muscle tightness and pain.  Traction was performed with a 30 second hold with a load of 26# and 10 second rest with a load of 13# for 15 minutes.       Written Home Exercises Provided: none today  Exercises were reviewed and Donavan was able to demonstrate them prior to the end of the session.  Donavan demonstrated good  understanding of the education provided.      See EMR under Patient Instructions for exercises provided 3/18/2019.        Pt. education:  · Posture reeducation, body mechanics, HEP,   · No spiritual or educational barriers to learning provided  · Pt has no cultural, educational or language barriers to learning provided.     Assessment   Patient demonstrates improvement in range of motion, strength, stabilization and function.    Patient appears independent in the prescribed HEP and ready for discharge after partially achieving the established goals.       GOALS:   Short Term Goals:  2 weeks MET STG's   Increase range of motion " 25%  Increase strength 1/2 muscle grade  Increase postural awareness to normal  Be able to perform HEP with minimal cueing required     Long Term Goals: 4 weeks  Increase range of motion to 75%to 100% of full MET  Increase strength 1 muscle grade 50% improved  Improve scapular stabilization to normal 50% improved   Restore ability to lie down without symptoms 75% improved  Restore ability to look up without increased symptoms 75% improved  Restore ability to reach turn head without increased [id number]% improved  Restore ability to perform ADL's and household activities independently and without symptoms 75% improved  Pt to be independent with HEP to improve ROM and independence with ADL's 75% improved     Anticipated barriers to physical therapy: none  Pt's spiritual, cultural and educational needs considered and pt agreeable to plan of care and goals         Plan   Patient is discharged from physical therapy after partially achieving the established goals.  Thank you for allowing us to assist in the care of your patient.

## 2019-05-27 RX ORDER — GABAPENTIN 300 MG/1
CAPSULE ORAL
Qty: 30 CAPSULE | Refills: 0 | Status: SHIPPED | OUTPATIENT
Start: 2019-05-27 | End: 2019-06-27 | Stop reason: SDUPTHER

## 2019-05-30 ENCOUNTER — PATIENT OUTREACH (OUTPATIENT)
Dept: ADMINISTRATIVE | Facility: HOSPITAL | Age: 74
End: 2019-05-30

## 2019-05-30 NOTE — PROGRESS NOTES
Health Maintenance Due   Topic Date Due    Shingles Vaccine (3 of 3) 01/04/2019     Chart review complete/scrubbed 05/30/2019  Links down 05/30/2019  Future Appointments   Date Time Provider Department Center   6/12/2019 10:20 AM Maddi Gastelum DO [facility]

## 2019-06-06 ENCOUNTER — LAB VISIT (OUTPATIENT)
Dept: LAB | Facility: HOSPITAL | Age: 74
End: 2019-06-06
Attending: INTERNAL MEDICINE
Payer: MEDICARE

## 2019-06-06 DIAGNOSIS — I10 ESSENTIAL HYPERTENSION: ICD-10-CM

## 2019-06-06 DIAGNOSIS — Z12.5 SCREENING FOR PROSTATE CANCER: ICD-10-CM

## 2019-06-06 LAB
ALBUMIN SERPL BCP-MCNC: 4 G/DL (ref 3.5–5.2)
ALP SERPL-CCNC: 64 U/L (ref 55–135)
ALT SERPL W/O P-5'-P-CCNC: 23 U/L (ref 10–44)
ANION GAP SERPL CALC-SCNC: 8 MMOL/L (ref 8–16)
AST SERPL-CCNC: 18 U/L (ref 10–40)
BASOPHILS # BLD AUTO: 0.02 K/UL (ref 0–0.2)
BASOPHILS NFR BLD: 0.3 % (ref 0–1.9)
BILIRUB SERPL-MCNC: 0.6 MG/DL (ref 0.1–1)
BUN SERPL-MCNC: 21 MG/DL (ref 8–23)
CALCIUM SERPL-MCNC: 9.2 MG/DL (ref 8.7–10.5)
CHLORIDE SERPL-SCNC: 106 MMOL/L (ref 95–110)
CHOLEST SERPL-MCNC: 172 MG/DL (ref 120–199)
CHOLEST/HDLC SERPL: 4.4 {RATIO} (ref 2–5)
CO2 SERPL-SCNC: 27 MMOL/L (ref 23–29)
COMPLEXED PSA SERPL-MCNC: 1.3 NG/ML (ref 0–4)
CREAT SERPL-MCNC: 0.9 MG/DL (ref 0.5–1.4)
DIFFERENTIAL METHOD: ABNORMAL
EOSINOPHIL # BLD AUTO: 0.2 K/UL (ref 0–0.5)
EOSINOPHIL NFR BLD: 3.2 % (ref 0–8)
ERYTHROCYTE [DISTWIDTH] IN BLOOD BY AUTOMATED COUNT: 13.5 % (ref 11.5–14.5)
EST. GFR  (AFRICAN AMERICAN): >60 ML/MIN/1.73 M^2
EST. GFR  (NON AFRICAN AMERICAN): >60 ML/MIN/1.73 M^2
GLUCOSE SERPL-MCNC: 98 MG/DL (ref 70–110)
HCT VFR BLD AUTO: 45.4 % (ref 40–54)
HDLC SERPL-MCNC: 39 MG/DL (ref 40–75)
HDLC SERPL: 22.7 % (ref 20–50)
HGB BLD-MCNC: 15.4 G/DL (ref 14–18)
IMM GRANULOCYTES # BLD AUTO: 0.02 K/UL (ref 0–0.04)
IMM GRANULOCYTES NFR BLD AUTO: 0.3 % (ref 0–0.5)
LDLC SERPL CALC-MCNC: 114.6 MG/DL (ref 63–159)
LYMPHOCYTES # BLD AUTO: 1.9 K/UL (ref 1–4.8)
LYMPHOCYTES NFR BLD: 25.1 % (ref 18–48)
MCH RBC QN AUTO: 31.4 PG (ref 27–31)
MCHC RBC AUTO-ENTMCNC: 33.9 G/DL (ref 32–36)
MCV RBC AUTO: 93 FL (ref 82–98)
MONOCYTES # BLD AUTO: 0.6 K/UL (ref 0.3–1)
MONOCYTES NFR BLD: 7.3 % (ref 4–15)
NEUTROPHILS # BLD AUTO: 4.9 K/UL (ref 1.8–7.7)
NEUTROPHILS NFR BLD: 63.8 % (ref 38–73)
NONHDLC SERPL-MCNC: 133 MG/DL
NRBC BLD-RTO: 0 /100 WBC
PLATELET # BLD AUTO: 237 K/UL (ref 150–350)
PMV BLD AUTO: 10.6 FL (ref 9.2–12.9)
POTASSIUM SERPL-SCNC: 4.8 MMOL/L (ref 3.5–5.1)
PROT SERPL-MCNC: 7 G/DL (ref 6–8.4)
RBC # BLD AUTO: 4.9 M/UL (ref 4.6–6.2)
SODIUM SERPL-SCNC: 141 MMOL/L (ref 136–145)
TRIGL SERPL-MCNC: 92 MG/DL (ref 30–150)
TSH SERPL DL<=0.005 MIU/L-ACNC: 2.38 UIU/ML (ref 0.4–4)
WBC # BLD AUTO: 7.58 K/UL (ref 3.9–12.7)

## 2019-06-06 PROCEDURE — 36415 COLL VENOUS BLD VENIPUNCTURE: CPT | Mod: PN

## 2019-06-06 PROCEDURE — 84443 ASSAY THYROID STIM HORMONE: CPT

## 2019-06-06 PROCEDURE — 80053 COMPREHEN METABOLIC PANEL: CPT

## 2019-06-06 PROCEDURE — 85025 COMPLETE CBC W/AUTO DIFF WBC: CPT

## 2019-06-06 PROCEDURE — 80061 LIPID PANEL: CPT

## 2019-06-06 PROCEDURE — 84153 ASSAY OF PSA TOTAL: CPT

## 2019-06-12 ENCOUNTER — OFFICE VISIT (OUTPATIENT)
Dept: FAMILY MEDICINE | Facility: CLINIC | Age: 74
End: 2019-06-12
Payer: MEDICARE

## 2019-06-12 VITALS
OXYGEN SATURATION: 98 % | TEMPERATURE: 98 F | HEART RATE: 91 BPM | DIASTOLIC BLOOD PRESSURE: 66 MMHG | WEIGHT: 177.19 LBS | SYSTOLIC BLOOD PRESSURE: 146 MMHG | BODY MASS INDEX: 24.81 KG/M2 | RESPIRATION RATE: 18 BRPM | HEIGHT: 71 IN

## 2019-06-12 DIAGNOSIS — M19.042 PRIMARY OSTEOARTHRITIS OF BOTH HANDS: ICD-10-CM

## 2019-06-12 DIAGNOSIS — M47.22 OSTEOARTHRITIS OF SPINE WITH RADICULOPATHY, CERVICAL REGION: ICD-10-CM

## 2019-06-12 DIAGNOSIS — I10 ESSENTIAL HYPERTENSION: Primary | ICD-10-CM

## 2019-06-12 DIAGNOSIS — N40.0 BENIGN NON-NODULAR PROSTATIC HYPERPLASIA WITHOUT LOWER URINARY TRACT SYMPTOMS: ICD-10-CM

## 2019-06-12 DIAGNOSIS — E78.5 HYPERLIPIDEMIA, UNSPECIFIED HYPERLIPIDEMIA TYPE: ICD-10-CM

## 2019-06-12 DIAGNOSIS — F51.01 PRIMARY INSOMNIA: ICD-10-CM

## 2019-06-12 DIAGNOSIS — E03.9 HYPOTHYROIDISM, UNSPECIFIED TYPE: ICD-10-CM

## 2019-06-12 DIAGNOSIS — M19.041 PRIMARY OSTEOARTHRITIS OF BOTH HANDS: ICD-10-CM

## 2019-06-12 DIAGNOSIS — M47.816 SPONDYLOSIS OF LUMBAR REGION WITHOUT MYELOPATHY OR RADICULOPATHY: ICD-10-CM

## 2019-06-12 PROCEDURE — 99214 OFFICE O/P EST MOD 30 MIN: CPT | Mod: S$GLB,,, | Performed by: INTERNAL MEDICINE

## 2019-06-12 PROCEDURE — 99214 PR OFFICE/OUTPT VISIT, EST, LEVL IV, 30-39 MIN: ICD-10-PCS | Mod: S$GLB,,, | Performed by: INTERNAL MEDICINE

## 2019-06-12 RX ORDER — LOSARTAN POTASSIUM 25 MG/1
25 TABLET ORAL DAILY
Qty: 90 TABLET | Refills: 3 | Status: SHIPPED | OUTPATIENT
Start: 2019-06-12 | End: 2020-05-11

## 2019-06-12 RX ORDER — TEMAZEPAM 15 MG/1
15 CAPSULE ORAL NIGHTLY PRN
Qty: 30 CAPSULE | Refills: 1 | Status: SHIPPED | OUTPATIENT
Start: 2019-06-12 | End: 2019-07-12

## 2019-06-12 RX ORDER — ROSUVASTATIN CALCIUM 5 MG/1
5 TABLET, COATED ORAL DAILY
Qty: 90 TABLET | Refills: 3 | Status: SHIPPED | OUTPATIENT
Start: 2019-06-12 | End: 2020-06-03

## 2019-06-12 RX ORDER — AMOXICILLIN 500 MG/1
CAPSULE ORAL
Refills: 0 | COMMUNITY
Start: 2019-06-05 | End: 2019-06-12

## 2019-06-12 NOTE — PROGRESS NOTES
Subjective:       Patient ID: Eric Buitrago is a 73 y.o. male.    Medication List with Changes/Refills   New Medications    LOSARTAN (COZAAR) 25 MG TABLET    Take 1 tablet (25 mg total) by mouth once daily.    ROSUVASTATIN (CRESTOR) 5 MG TABLET    Take 1 tablet (5 mg total) by mouth once daily.    TEMAZEPAM (RESTORIL) 15 MG CAP    Take 1 capsule (15 mg total) by mouth nightly as needed.   Current Medications    AZELASTINE (ASTELIN) 137 MCG (0.1 %) NASAL SPRAY    USE 1 SPRAY IN EACH NOSTRIL TWICE DAILY    DOXEPIN (SINEQUAN) 150 MG CAP    TAKE 1 CAPSULE BY MOUTH EVERY DAY    FEXOFENADINE (ALLEGRA) 60 MG TABLET    Take 1 tablet by mouth.    GABAPENTIN (NEURONTIN) 300 MG CAPSULE    TAKE 1 CAPSULE(300 MG) BY MOUTH EVERY EVENING    LEVOTHYROXINE (SYNTHROID) 137 MCG TAB TABLET    TAKE 1 TABLET(137 MCG) BY MOUTH BEFORE BREAKFAST    MELOXICAM (MOBIC) 15 MG TABLET    TAKE 1 TABLET(15 MG) BY MOUTH EVERY DAY    TAMSULOSIN (FLOMAX) 0.4 MG CAP    TAKE ONE CAPSULE BY MOUTH EVERY EVENING   Discontinued Medications    AMOXICILLIN (AMOXIL) 500 MG CAPSULE    TK TWO CS PO TODAY THEN ONE C PO TID TAT    CIPROFLOXACIN HCL (CIPRO) 500 MG TABLET    Take 1 tablet (500 mg total) by mouth 2 (two) times daily.    LISINOPRIL 10 MG TABLET    TAKE 1 TABLET(10 MG) BY MOUTH EVERY DAY    SHINGRIX, PF, 50 MCG/0.5 ML INJECTION    ADM 0.5ML IM UTD       Chief Complaint: Follow-up (6 month follow up with labs prior)  He is here today to f/u on chronic medical issues. He has no complaints today.      He has hypothyroid that is controlled on levothyroxine 137 mcg qday. His last TSH was normal on 6/2019..       He has hypertension and is taking lisinopril 10 mg qday. He denies any CAD. He denies chest pain or shortness of breath.     He has mildly elevated lipids check in 6/2019 were 172/92/39/114. He is not on treatment. He does not take aspirin.      He has BPH which is controlled with flomax. He says this helps with his frequency and nocturia.       He has seasonal allergies controlled with allegra and astelin which he takes daily.  Since starting to take daily he has no congestion and has not had sinus infections.         He has IBS for many years. He was seen by GI about 10 years ago and started on doxepin for spastic colon.  He says this has worked and he no longer has any symptoms.  His most recent colonoscopy on 2/2018 had no polyps but did show mild colonic spasms.      He has insomnia that is controlled with PRN temazepam  15 mg qhs 2-3 times a week. He says it works and denies side effects.       He occasionally has low back pain if he over exerts himself. He will take mobic with good relief.  He is pain free today. Pain worse with bending or lifting.  Better with stretching and rest.      He has continue left shoulder pain with intermittent radiation down left arm. He was seen by pain management and had an MRI on 1/2019 showing Multilevel spondylosis, greatest at C5-6 where there is mild cord flattening with effacement of ventral and dorsal CSF.  No cord signal abnormality.Multilevel foraminal stenosis, greatest at C5-6 where it is moderate-severe bilaterally. Moderate multilevel hypertrophic facet arthropathy.  He was seen by pain and started on gabaepntin 300 mg qhs which has helped with his pain.  If worsens then pain would like to do a cervical steroid injection.  He completed PT without much difference in his pain.      He is very active and enjoys working in his yard. He goes to the gym 3 times a week for one hour and does treadmill and weight training. He does eat healthy.  He has not exercised lately but he is restarting his routine.      Colonoscopy---2/2018 repeat in 10 years  Tdap---9/2017  Influenza vaccine---12/2018  Pneumovax 23----6/2018  Prevnar 13----6/2017  Shingles vaccine----- 10/2011, 11/2018, 1/2019       Review of Systems   Constitutional: Negative for appetite change, fatigue, fever and unexpected weight change.   HENT:  "Negative for congestion, ear pain, hearing loss, sore throat and trouble swallowing.    Eyes: Negative for pain and visual disturbance.   Respiratory: Negative for cough, chest tightness, shortness of breath and wheezing.    Cardiovascular: Negative for chest pain, palpitations and leg swelling.   Gastrointestinal: Negative for abdominal pain, blood in stool, constipation, diarrhea, nausea and vomiting.   Endocrine: Negative for polyuria.   Genitourinary: Negative for dysuria and hematuria.   Musculoskeletal: Positive for arthralgias (left shoulder) and back pain. Negative for myalgias.   Skin: Negative for rash.   Neurological: Negative for dizziness, weakness, numbness and headaches.   Hematological: Does not bruise/bleed easily.   Psychiatric/Behavioral: Positive for sleep disturbance. Negative for dysphoric mood and suicidal ideas. The patient is not nervous/anxious.        Objective:      Vitals:    06/12/19 1023   BP: (!) 146/66   Pulse: 91   Resp: 18   Temp: 97.9 °F (36.6 °C)   TempSrc: Oral   SpO2: 98%   Weight: 80.4 kg (177 lb 3.2 oz)   Height: 5' 11" (1.803 m)     Body mass index is 24.71 kg/m².  Physical Exam    General appearance: No acute distress, cooperative  Eyes: PERRL, EOMI, conjunctiva clear  Ears: normal external ear and pinna, tm clear without drainage, canals clear  Nose: Normal mucosa without drainage  Throat: no exudates or erythema, tonsils not enlarged  Mouth: no sores or lesions, moist mucous membranes  Neck: FROM, soft, supple, no thyromegaly, no bruits  Lymph: no anterior or posterior cervical adenopathy  Heart::  Regular rate and rhythm, no murmur  Lung: Clear to ascultation bilaterally, no wheezing, no rales, no rhonchi, no distress  Abdomen: Soft, nontender, no distention, no hepatosplenomegaly, bowel sounds normal, no guarding, no rebound, no peritoneal signs  Skin: no rashes, no lesions  Extremities: no edema, no cyanosis  Neuro: CN 2-12 intact, 5/5 muscle strength upper and lower " extremity bilaterally, 2+ DTRs UE and LE bilaterally, normal gait, normal sensation  Peripheral pulses: 2+ pedal pulses bilaterally, good perfusion and color  Musculoskeletal: FROM, good strenth, no tenderness  Joint: normal appearance, no swelling, no warmth, no deformity in all joints    Assessment:       1. Essential hypertension    2. Hyperlipidemia, unspecified hyperlipidemia type    3. Hypothyroidism, unspecified type    4. Primary insomnia    5. Benign non-nodular prostatic hyperplasia without lower urinary tract symptoms    6. Primary osteoarthritis of both hands    7. Spondylosis of lumbar region without myelopathy or radiculopathy    8. Osteoarthritis of spine with radiculopathy, cervical region        Plan:       Essential hypertension  Uncontrolled and will stop lisinopril due to recent study showing risk with long term use. Will start losartan 25 mg qday (higher dose). REcheck with nurse in 2 weeks.   -     losartan (COZAAR) 25 MG tablet; Take 1 tablet (25 mg total) by mouth once daily.  Dispense: 90 tablet; Refill: 3    Hyperlipidemia, unspecified hyperlipidemia type  Uncontrolled and will start low dose crestor. Recheck lipids in 3 months.   -     rosuvastatin (CRESTOR) 5 MG tablet; Take 1 tablet (5 mg total) by mouth once daily.  Dispense: 90 tablet; Refill: 3  -     Comprehensive metabolic panel; Future; Expected date: 09/12/2019  -     Lipid panel; Future; Expected date: 09/12/2019    Hypothyroidism, unspecified type  Good control on this dose of levothyroxine.     Primary insomnia  Good control on intermittent use of temazepam. Refill given today. No evidence of abuse by .   -     temazepam (RESTORIL) 15 mg Cap; Take 1 capsule (15 mg total) by mouth nightly as needed.  Dispense: 30 capsule; Refill: 1    Benign non-nodular prostatic hyperplasia without lower urinary tract symptoms  Controlled and normal PSA.     Primary osteoarthritis of both hands  Uncontrolled and okay to do a trial of  glucosamine.     Spondylosis of lumbar region without myelopathy or radiculopathy  Mild and no complaints today.     Osteoarthritis of spine with radiculopathy, cervical region  Improved on gabapentin. If worsens then he will return to pain clinic to consider steroid injection.     Follow up in about 6 months (around 12/12/2019) for chronic medical issues and 2 weeks with nurse for BP check .

## 2019-06-26 ENCOUNTER — CLINICAL SUPPORT (OUTPATIENT)
Dept: FAMILY MEDICINE | Facility: CLINIC | Age: 74
End: 2019-06-26
Payer: MEDICARE

## 2019-06-26 VITALS — SYSTOLIC BLOOD PRESSURE: 124 MMHG | DIASTOLIC BLOOD PRESSURE: 76 MMHG

## 2019-06-26 DIAGNOSIS — I10 ESSENTIAL HYPERTENSION: Primary | ICD-10-CM

## 2019-06-26 NOTE — PROGRESS NOTES
Eric Buitrago 73 y.o. male is here today for Blood Pressure check.   History of HTN yes.    Review of patient's allergies indicates:   Allergen Reactions    No known drug allergies      Creatinine   Date Value Ref Range Status   06/06/2019 0.9 0.5 - 1.4 mg/dL Final     Sodium   Date Value Ref Range Status   06/06/2019 141 136 - 145 mmol/L Final     Potassium   Date Value Ref Range Status   06/06/2019 4.8 3.5 - 5.1 mmol/L Final   ]  Patient verifies taking blood pressure medications on a regular basis at the same time of the day.     Current Outpatient Medications:     azelastine (ASTELIN) 137 mcg (0.1 %) nasal spray, USE 1 SPRAY IN EACH NOSTRIL TWICE DAILY, Disp: 30 mL, Rfl: 6    doxepin (SINEQUAN) 150 MG Cap, TAKE 1 CAPSULE BY MOUTH EVERY DAY, Disp: 90 capsule, Rfl: 2    fexofenadine (ALLEGRA) 60 MG tablet, Take 1 tablet by mouth., Disp: , Rfl:     gabapentin (NEURONTIN) 300 MG capsule, TAKE 1 CAPSULE(300 MG) BY MOUTH EVERY EVENING, Disp: 30 capsule, Rfl: 0    levothyroxine (SYNTHROID) 137 MCG Tab tablet, TAKE 1 TABLET(137 MCG) BY MOUTH BEFORE BREAKFAST, Disp: 90 tablet, Rfl: 3    losartan (COZAAR) 25 MG tablet, Take 1 tablet (25 mg total) by mouth once daily., Disp: 90 tablet, Rfl: 3    meloxicam (MOBIC) 15 MG tablet, TAKE 1 TABLET(15 MG) BY MOUTH EVERY DAY, Disp: 90 tablet, Rfl: 3    rosuvastatin (CRESTOR) 5 MG tablet, Take 1 tablet (5 mg total) by mouth once daily., Disp: 90 tablet, Rfl: 3    tamsulosin (FLOMAX) 0.4 mg Cap, TAKE ONE CAPSULE BY MOUTH EVERY EVENING, Disp: 90 capsule, Rfl: 3    temazepam (RESTORIL) 15 mg Cap, Take 1 capsule (15 mg total) by mouth nightly as needed., Disp: 30 capsule, Rfl: 1  Does patient have record of home blood pressure readings no. .   Last dose of blood pressure medication was taken at 8 pm.  Patient is asymptomatic.   Complains of no complaints.    BP: 124/76 ,   .      Dr. Gastelum notified.

## 2019-06-27 RX ORDER — GABAPENTIN 300 MG/1
CAPSULE ORAL
Qty: 30 CAPSULE | Refills: 6 | Status: SHIPPED | OUTPATIENT
Start: 2019-06-27 | End: 2020-01-27

## 2019-07-25 RX ORDER — LEVOTHYROXINE SODIUM 137 UG/1
TABLET ORAL
Qty: 90 TABLET | Refills: 3 | Status: SHIPPED | OUTPATIENT
Start: 2019-07-25 | End: 2020-08-24

## 2019-09-03 DIAGNOSIS — F51.01 PRIMARY INSOMNIA: ICD-10-CM

## 2019-09-03 RX ORDER — DOXEPIN HYDROCHLORIDE 150 MG/1
CAPSULE ORAL
Qty: 90 CAPSULE | Refills: 2 | Status: SHIPPED | OUTPATIENT
Start: 2019-09-03 | End: 2020-06-03

## 2019-09-11 DIAGNOSIS — N40.0 BENIGN NON-NODULAR PROSTATIC HYPERPLASIA WITHOUT LOWER URINARY TRACT SYMPTOMS: ICD-10-CM

## 2019-09-11 RX ORDER — TAMSULOSIN HYDROCHLORIDE 0.4 MG/1
CAPSULE ORAL
Qty: 90 CAPSULE | Refills: 3 | Status: SHIPPED | OUTPATIENT
Start: 2019-09-11 | End: 2020-07-20

## 2019-09-12 ENCOUNTER — LAB VISIT (OUTPATIENT)
Dept: LAB | Facility: HOSPITAL | Age: 74
End: 2019-09-12
Attending: INTERNAL MEDICINE
Payer: MEDICARE

## 2019-09-12 DIAGNOSIS — E78.5 HYPERLIPIDEMIA, UNSPECIFIED HYPERLIPIDEMIA TYPE: ICD-10-CM

## 2019-09-12 LAB
ALBUMIN SERPL BCP-MCNC: 4.1 G/DL (ref 3.5–5.2)
ALP SERPL-CCNC: 58 U/L (ref 55–135)
ALT SERPL W/O P-5'-P-CCNC: 27 U/L (ref 10–44)
ANION GAP SERPL CALC-SCNC: 7 MMOL/L (ref 8–16)
AST SERPL-CCNC: 20 U/L (ref 10–40)
BILIRUB SERPL-MCNC: 0.6 MG/DL (ref 0.1–1)
BUN SERPL-MCNC: 21 MG/DL (ref 8–23)
CALCIUM SERPL-MCNC: 9.3 MG/DL (ref 8.7–10.5)
CHLORIDE SERPL-SCNC: 107 MMOL/L (ref 95–110)
CHOLEST SERPL-MCNC: 121 MG/DL (ref 120–199)
CHOLEST/HDLC SERPL: 3 {RATIO} (ref 2–5)
CO2 SERPL-SCNC: 27 MMOL/L (ref 23–29)
CREAT SERPL-MCNC: 0.9 MG/DL (ref 0.5–1.4)
EST. GFR  (AFRICAN AMERICAN): >60 ML/MIN/1.73 M^2
EST. GFR  (NON AFRICAN AMERICAN): >60 ML/MIN/1.73 M^2
GLUCOSE SERPL-MCNC: 101 MG/DL (ref 70–110)
HDLC SERPL-MCNC: 40 MG/DL (ref 40–75)
HDLC SERPL: 33.1 % (ref 20–50)
LDLC SERPL CALC-MCNC: 60.6 MG/DL (ref 63–159)
NONHDLC SERPL-MCNC: 81 MG/DL
POTASSIUM SERPL-SCNC: 4.7 MMOL/L (ref 3.5–5.1)
PROT SERPL-MCNC: 7 G/DL (ref 6–8.4)
SODIUM SERPL-SCNC: 141 MMOL/L (ref 136–145)
TRIGL SERPL-MCNC: 102 MG/DL (ref 30–150)

## 2019-09-12 PROCEDURE — 36415 COLL VENOUS BLD VENIPUNCTURE: CPT | Mod: PN

## 2019-09-12 PROCEDURE — 80053 COMPREHEN METABOLIC PANEL: CPT

## 2019-09-12 PROCEDURE — 80061 LIPID PANEL: CPT

## 2019-09-17 ENCOUNTER — TELEPHONE (OUTPATIENT)
Dept: FAMILY MEDICINE | Facility: CLINIC | Age: 74
End: 2019-09-17

## 2019-09-17 NOTE — TELEPHONE ENCOUNTER
Please let him know that his labs look good. Normal liver and kidney. His cholesterol looks great on low dose crestor.     Thanks

## 2019-09-23 DIAGNOSIS — M47.816 SPONDYLOSIS OF LUMBAR REGION WITHOUT MYELOPATHY OR RADICULOPATHY: ICD-10-CM

## 2019-09-23 RX ORDER — MELOXICAM 15 MG/1
TABLET ORAL
Qty: 90 TABLET | Refills: 3 | Status: SHIPPED | OUTPATIENT
Start: 2019-09-23 | End: 2020-09-08

## 2019-11-13 DIAGNOSIS — J01.80 OTHER ACUTE SINUSITIS, RECURRENCE NOT SPECIFIED: ICD-10-CM

## 2019-11-13 DIAGNOSIS — J06.9 UPPER RESPIRATORY TRACT INFECTION, UNSPECIFIED TYPE: ICD-10-CM

## 2019-11-13 RX ORDER — AZELASTINE 1 MG/ML
SPRAY, METERED NASAL
Qty: 30 ML | Refills: 11 | Status: SHIPPED | OUTPATIENT
Start: 2019-11-13 | End: 2020-12-01 | Stop reason: SDUPTHER

## 2019-12-12 ENCOUNTER — OFFICE VISIT (OUTPATIENT)
Dept: FAMILY MEDICINE | Facility: CLINIC | Age: 74
End: 2019-12-12
Payer: MEDICARE

## 2019-12-12 ENCOUNTER — TELEPHONE (OUTPATIENT)
Dept: FAMILY MEDICINE | Facility: CLINIC | Age: 74
End: 2019-12-12

## 2019-12-12 VITALS
BODY MASS INDEX: 28.53 KG/M2 | TEMPERATURE: 98 F | RESPIRATION RATE: 18 BRPM | WEIGHT: 181.75 LBS | DIASTOLIC BLOOD PRESSURE: 70 MMHG | HEIGHT: 67 IN | HEART RATE: 95 BPM | SYSTOLIC BLOOD PRESSURE: 134 MMHG | OXYGEN SATURATION: 98 %

## 2019-12-12 DIAGNOSIS — M19.042 PRIMARY OSTEOARTHRITIS OF BOTH HANDS: ICD-10-CM

## 2019-12-12 DIAGNOSIS — M47.22 OSTEOARTHRITIS OF SPINE WITH RADICULOPATHY, CERVICAL REGION: ICD-10-CM

## 2019-12-12 DIAGNOSIS — E78.5 HYPERLIPIDEMIA, UNSPECIFIED HYPERLIPIDEMIA TYPE: ICD-10-CM

## 2019-12-12 DIAGNOSIS — F51.01 PRIMARY INSOMNIA: ICD-10-CM

## 2019-12-12 DIAGNOSIS — N40.0 BENIGN NON-NODULAR PROSTATIC HYPERPLASIA WITHOUT LOWER URINARY TRACT SYMPTOMS: ICD-10-CM

## 2019-12-12 DIAGNOSIS — M65.342 TRIGGER RING FINGER OF LEFT HAND: ICD-10-CM

## 2019-12-12 DIAGNOSIS — I10 ESSENTIAL HYPERTENSION: Primary | ICD-10-CM

## 2019-12-12 DIAGNOSIS — E03.9 HYPOTHYROIDISM, UNSPECIFIED TYPE: ICD-10-CM

## 2019-12-12 DIAGNOSIS — K58.9 IRRITABLE BOWEL SYNDROME, UNSPECIFIED TYPE: ICD-10-CM

## 2019-12-12 DIAGNOSIS — M19.041 PRIMARY OSTEOARTHRITIS OF BOTH HANDS: ICD-10-CM

## 2019-12-12 PROCEDURE — 99214 OFFICE O/P EST MOD 30 MIN: CPT | Mod: S$GLB,,, | Performed by: INTERNAL MEDICINE

## 2019-12-12 PROCEDURE — 99214 PR OFFICE/OUTPT VISIT, EST, LEVL IV, 30-39 MIN: ICD-10-PCS | Mod: S$GLB,,, | Performed by: INTERNAL MEDICINE

## 2019-12-12 RX ORDER — TEMAZEPAM 15 MG/1
15 CAPSULE ORAL NIGHTLY PRN
COMMUNITY
End: 2019-12-12 | Stop reason: SDUPTHER

## 2019-12-12 RX ORDER — TEMAZEPAM 15 MG/1
15 CAPSULE ORAL NIGHTLY PRN
Qty: 30 CAPSULE | Refills: 1 | Status: SHIPPED | OUTPATIENT
Start: 2019-12-12 | End: 2020-04-08

## 2019-12-12 NOTE — PROGRESS NOTES
Subjective:       Patient ID: Eric Buitrago is a 74 y.o. male.    Medication List with Changes/Refills   Current Medications    AZELASTINE (ASTELIN) 137 MCG (0.1 %) NASAL SPRAY    USE 1 SPRAY IN EACH NOSTRIL TWICE DAILY    DOXEPIN (SINEQUAN) 150 MG CAP    TAKE 1 CAPSULE BY MOUTH EVERY DAY    FEXOFENADINE (ALLEGRA) 60 MG TABLET    Take 1 tablet by mouth.    GABAPENTIN (NEURONTIN) 300 MG CAPSULE    TAKE 1 CAPSULE(300 MG) BY MOUTH EVERY EVENING    LEVOTHYROXINE (SYNTHROID) 137 MCG TAB TABLET    TAKE 1 TABLET(137 MCG) BY MOUTH BEFORE BREAKFAST    LOSARTAN (COZAAR) 25 MG TABLET    Take 1 tablet (25 mg total) by mouth once daily.    MELOXICAM (MOBIC) 15 MG TABLET    TAKE 1 TABLET(15 MG) BY MOUTH EVERY DAY    ROSUVASTATIN (CRESTOR) 5 MG TABLET    Take 1 tablet (5 mg total) by mouth once daily.    TAMSULOSIN (FLOMAX) 0.4 MG CAP    TAKE 1 CAPSULE BY MOUTH EVERY EVENING   Changed and/or Refilled Medications    Modified Medication Previous Medication    TEMAZEPAM (RESTORIL) 15 MG CAP temazepam (RESTORIL) 15 mg Cap       Take 1 capsule (15 mg total) by mouth nightly as needed.    Take 15 mg by mouth nightly as needed.       Chief Complaint: Follow-up (6 month follow up: Flu shot today)  He is here today to f/u on chronic medical issues. He has no complaints today.      He has hypothyroid that is controlled on levothyroxine 137 mcg qday. His last TSH was normal on 6/2019.       He has hypertension and is taking losartan 25 mg qday.  He denies any CAD. He denies chest pain or shortness of breath.     He has hyperlipidemia and is taking crestor 5 mg qday. His last lipids on 9/2019 were 121/102/40/60.       He has BPH which is controlled with flomax. He says this helps with his frequency and nocturia.      He has seasonal allergies controlled with allegra and astelin which he takes daily.  Since starting to take daily he has no congestion and has not had sinus infections.         He has IBS for many years. He was seen by GI  about 10 years ago and started on doxepin for spastic colon.  He says this has worked and he no longer has any symptoms.  His most recent colonoscopy on 2/2018 had no polyps but did show mild colonic spasms.      He has insomnia that is controlled with PRN temazepam 15 mg 1/2 pill qhs 2-3 times a week. He says it works and denies side effects.  Last fill was #30 on 8/20/2019.       He occasionally has low back pain if he over exerts himself. He will take mobic with good relief.  He is pain free today. Pain worse with bending or lifting.  Better with stretching and rest.       He has continue left shoulder pain with intermittent radiation down left arm. He was seen by pain management and had an MRI on 1/2019 showing Multilevel spondylosis, greatest at C5-6 where there is mild cord flattening with effacement of ventral and dorsal CSF.  No cord signal abnormality.Multilevel foraminal stenosis, greatest at C5-6 where it is moderate-severe bilaterally. Moderate multilevel hypertrophic facet arthropathy.  He was seen by pain and started on gabaepntin 300 mg qhs which has helped with his pain.  If worsens then pain would like to do a cervical steroid injection.  He completed PT without much difference in his pain.  He reports he is pain free during the day but at night has increased symptoms. He does feel gabapentin helps.     He has osteoarthritis of both hands left > right. 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 He says years ago he had a steroid injection in his thumb that helped a similar problem.       He is very active and enjoys working in his yard. He goes to the gym 3 times a week for one hour and does treadmill and weight training. He does eat healthy.      Colonoscopy---2/2018 repeat in 10 years  Tdap---9/2017  Influenza vaccine---12/2018---due  Pneumovax 23----6/2018  Prevnar 13----6/2017  Shingles vaccine----- 10/2011, 11/2018,  "1/2019     Review of Systems   Constitutional: Negative for appetite change, fatigue, fever and unexpected weight change.   HENT: Negative for congestion, ear pain, hearing loss, sore throat and trouble swallowing.    Eyes: Negative for pain and visual disturbance.   Respiratory: Negative for cough, chest tightness, shortness of breath and wheezing.    Cardiovascular: Negative for chest pain, palpitations and leg swelling.   Gastrointestinal: Negative for abdominal pain, blood in stool, constipation, diarrhea, nausea and vomiting.   Endocrine: Negative for polyuria.   Genitourinary: Negative for dysuria and hematuria.   Musculoskeletal: Positive for arthralgias (left shoulder at night). Negative for back pain and myalgias.   Skin: Negative for rash.   Neurological: Negative for dizziness, weakness, numbness and headaches.   Hematological: Does not bruise/bleed easily.   Psychiatric/Behavioral: Negative for dysphoric mood, sleep disturbance and suicidal ideas. The patient is not nervous/anxious.        Objective:      Vitals:    12/12/19 1058   BP: 134/70   Pulse: 95   Resp: 18   Temp: 97.9 °F (36.6 °C)   TempSrc: Oral   SpO2: 98%   Weight: 82.5 kg (181 lb 12.3 oz)   Height: 5' 7" (1.702 m)     Body mass index is 28.47 kg/m².  Physical Exam    General appearance: No acute distress, cooperative  Eyes: PERRL, EOMI, conjunctiva clear  Ears: normal external ear and pinna, tm clear without drainage, canals clear  Nose: Normal mucosa without drainage  Throat: no exudates or erythema, tonsils not enlarged  Mouth: no sores or lesions, moist mucous membranes  Neck: FROM, soft, supple, no thyromegaly, no bruits  Lymph: no anterior or posterior cervical adenopathy  Heart::  Regular rate and rhythm, no murmur  Lung: Clear to ascultation bilaterally, no wheezing, no rales, no rhonchi, no distress  Abdomen: Soft, nontender, no distention, no hepatosplenomegaly, bowel sounds normal, no guarding, no rebound, no peritoneal " signs  Skin: no rashes, no lesions  Extremities: no edema, no cyanosis  Neuro: CN 2-12 intact, 5/5 muscle strength upper and lower extremity bilaterally, 2+ DTRs UE and LE bilaterally, normal gai  Peripheral pulses: 2+ pedal pulses bilaterally, good perfusion and color  Musculoskeletal: FROM, good strenth, no tenderness  Joint: normal appearance, no swelling, no warmth, both hands with nodules at DIP and swelling of MCP/DIP joints.  Left hand with some ulnar deviation and tight ligaments on palm, limited ROM on left hand    Assessment:       1. Essential hypertension    2. Hyperlipidemia, unspecified hyperlipidemia type    3. Hypothyroidism, unspecified type    4. Primary insomnia    5. Irritable bowel syndrome, unspecified type    6. Benign non-nodular prostatic hyperplasia without lower urinary tract symptoms    7. Primary osteoarthritis of both hands    8. Trigger ring finger of left hand    9. Osteoarthritis of spine with radiculopathy, cervical region        Plan:       Essential hypertension  Good control on this regimen. He is due for labs prior to his next appt.   -     CBC auto differential; Future; Expected date: 06/01/2020  -     Comprehensive metabolic panel; Future; Expected date: 06/01/2020  -     Lipid panel; Future; Expected date: 06/01/2020  -     TSH; Future; Expected date: 06/01/2020    Hyperlipidemia, unspecified hyperlipidemia type  Good control on crestor and he is tolerating well.     Hypothyroidism, unspecified type  Good control on this dose of levothyroxine.     Primary insomnia  Controlled on temazepam PRN. No evidence of abuse or over use by . Refill given today.   -     temazepam (RESTORIL) 15 mg Cap; Take 1 capsule (15 mg total) by mouth nightly as needed.  Dispense: 30 capsule; Refill: 1    Irritable bowel syndrome, unspecified type  Controlled on doxepin and no active symptoms.     Benign non-nodular prostatic hyperplasia without lower urinary tract symptoms  Good control on  flomax.     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 Referral to orthopedics to see if there is any intervention that could help.   -     Ambulatory referral/consult to Orthopedics; Future; Expected date: 12/12/2019    Osteoarthritis of spine with radiculopathy, cervical region  Controlled on gabapentin and mobic nightly.     Follow up in about 6 months (around 6/12/2020) for chronic medical issues.

## 2019-12-12 NOTE — TELEPHONE ENCOUNTER
PA request received from [facility]  For temazepam  Pt ID    PA initiated via [email or web address]  Key [id number]    Awaiting response

## 2020-01-13 ENCOUNTER — OFFICE VISIT (OUTPATIENT)
Dept: ORTHOPEDICS | Facility: CLINIC | Age: 75
End: 2020-01-13
Payer: MEDICARE

## 2020-01-13 ENCOUNTER — HOSPITAL ENCOUNTER (OUTPATIENT)
Dept: RADIOLOGY | Facility: HOSPITAL | Age: 75
Discharge: HOME | End: 2020-01-13
Attending: NURSE PRACTITIONER
Payer: MEDICARE

## 2020-01-13 VITALS
HEIGHT: 71 IN | HEART RATE: 95 BPM | BODY MASS INDEX: 25.34 KG/M2 | DIASTOLIC BLOOD PRESSURE: 83 MMHG | SYSTOLIC BLOOD PRESSURE: 152 MMHG | WEIGHT: 181 LBS

## 2020-01-13 DIAGNOSIS — M65.342 TRIGGER RING FINGER OF LEFT HAND: ICD-10-CM

## 2020-01-13 DIAGNOSIS — M19.042 ARTHRITIS OF LEFT HAND: ICD-10-CM

## 2020-01-13 DIAGNOSIS — M47.22 OSTEOARTHRITIS OF SPINE WITH RADICULOPATHY, CERVICAL REGION: ICD-10-CM

## 2020-01-13 DIAGNOSIS — M79.642 LEFT HAND PAIN: Primary | ICD-10-CM

## 2020-01-13 DIAGNOSIS — M79.642 LEFT HAND PAIN: ICD-10-CM

## 2020-01-13 PROCEDURE — 1159F PR MEDICATION LIST DOCUMENTED IN MEDICAL RECORD: ICD-10-PCS | Mod: ,,, | Performed by: ORTHOPAEDIC SURGERY

## 2020-01-13 PROCEDURE — 73130 XR HAND COMPLETE 3 VIEW LEFT: ICD-10-PCS | Mod: 26,LT,, | Performed by: RADIOLOGY

## 2020-01-13 PROCEDURE — 99999 PR PBB SHADOW E&M-EST. PATIENT-LVL IV: CPT | Mod: PBBFAC,,, | Performed by: ORTHOPAEDIC SURGERY

## 2020-01-13 PROCEDURE — 99214 OFFICE O/P EST MOD 30 MIN: CPT | Mod: PBBFAC,25,PN | Performed by: ORTHOPAEDIC SURGERY

## 2020-01-13 PROCEDURE — 99999 PR PBB SHADOW E&M-EST. PATIENT-LVL IV: ICD-10-PCS | Mod: PBBFAC,,, | Performed by: ORTHOPAEDIC SURGERY

## 2020-01-13 PROCEDURE — 99203 OFFICE O/P NEW LOW 30 MIN: CPT | Mod: S$PBB,,, | Performed by: ORTHOPAEDIC SURGERY

## 2020-01-13 PROCEDURE — 99203 PR OFFICE/OUTPT VISIT, NEW, LEVL III, 30-44 MIN: ICD-10-PCS | Mod: S$PBB,,, | Performed by: ORTHOPAEDIC SURGERY

## 2020-01-13 PROCEDURE — 1159F MED LIST DOCD IN RCRD: CPT | Mod: ,,, | Performed by: ORTHOPAEDIC SURGERY

## 2020-01-13 PROCEDURE — 1126F AMNT PAIN NOTED NONE PRSNT: CPT | Mod: ,,, | Performed by: ORTHOPAEDIC SURGERY

## 2020-01-13 PROCEDURE — 1126F PR PAIN SEVERITY QUANTIFIED, NO PAIN PRESENT: ICD-10-PCS | Mod: ,,, | Performed by: ORTHOPAEDIC SURGERY

## 2020-01-13 PROCEDURE — 73130 X-RAY EXAM OF HAND: CPT | Mod: TC,PO,LT

## 2020-01-13 PROCEDURE — 73130 X-RAY EXAM OF HAND: CPT | Mod: 26,LT,, | Performed by: RADIOLOGY

## 2020-01-13 NOTE — PROGRESS NOTES
2020    Chief Complaint:  Chief Complaint   Patient presents with    Hand Problem     pt states he is a  but has had trouble bending left ring and middle for fingers for approx 6 mos       HPI:  Eric Buitrago is a 74 y.o. male, who presents to clinic today is having problems bending and straightening his left ring and middle fingers.  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  States that he does have significant stiffness in those fingers but only mild to moderate pain. He is here today for assessment of his hand for treatment options.  Has no other complaints.    PMHX:  Past Medical History:   Diagnosis Date    Allergy     Hyperlipidemia     Hypertension     Hypothyroid     IBS (irritable bowel syndrome)        PSHX:  Past Surgical History:   Procedure Laterality Date    CHOLECYSTECTOMY      COLONOSCOPY  ~    RABITO.    COLONOSCOPY N/A 2018    Procedure: COLONOSCOPY;  Surgeon: Josue Myles Jr., MD;  Location: [facility];  Service: Endoscopy;  Laterality: N/A;       FMHX:  Family History   Problem Relation Age of Onset    Lymphoma Mother     Stroke Father     Breast cancer Sister     Leukemia Brother        SOCHX:  Social History     Tobacco Use    Smoking status: Former Smoker     Last attempt to quit: 1983     Years since quittin.5    Smokeless tobacco: Never Used   Substance Use Topics    Alcohol use: Yes     Comment: occ       ALLERGIES:  No known drug allergies    CURRENT MEDICATIONS:  Current Outpatient Medications on File Prior to Visit   Medication Sig Dispense Refill    azelastine (ASTELIN) 137 mcg (0.1 %) nasal spray USE 1 SPRAY IN EACH NOSTRIL TWICE DAILY 30 mL 11    doxepin (SINEQUAN) 150 MG Cap TAKE 1 CAPSULE BY MOUTH EVERY DAY 90 capsule 2    fexofenadine (ALLEGRA) 60 MG tablet Take 1 tablet by mouth.      gabapentin  "(NEURONTIN) 300 MG capsule TAKE 1 CAPSULE(300 MG) BY MOUTH EVERY EVENING 30 capsule 6    levothyroxine (SYNTHROID) 137 MCG Tab tablet TAKE 1 TABLET(137 MCG) BY MOUTH BEFORE BREAKFAST 90 tablet 3    losartan (COZAAR) 25 MG tablet Take 1 tablet (25 mg total) by mouth once daily. 90 tablet 3    meloxicam (MOBIC) 15 MG tablet TAKE 1 TABLET(15 MG) BY MOUTH EVERY DAY 90 tablet 3    rosuvastatin (CRESTOR) 5 MG tablet Take 1 tablet (5 mg total) by mouth once daily. 90 tablet 3    tamsulosin (FLOMAX) 0.4 mg Cap TAKE 1 CAPSULE BY MOUTH EVERY EVENING 90 capsule 3    temazepam (RESTORIL) 15 mg Cap Take 1 capsule (15 mg total) by mouth nightly as needed. 30 capsule 1     No current facility-administered medications on file prior to visit.        REVIEW OF SYSTEMS:  Review of Systems   Constitutional: Negative for chills and fever.   HENT: Negative for ear pain, nosebleeds and sore throat.    Eyes: Negative for pain and discharge.   Respiratory: Negative for shortness of breath and wheezing.    Cardiovascular: Negative for chest pain and palpitations.   Gastrointestinal: Negative for heartburn, nausea and vomiting.   Genitourinary: Positive for frequency. Negative for dysuria and urgency.   Skin: Negative for rash.   Neurological: Negative for seizures and headaches.   Psychiatric/Behavioral: The patient has insomnia.        GENERAL PHYSICAL EXAM:   BP (!) 152/83   Pulse 95   Ht 5' 11" (1.803 m)   Wt 82.1 kg (181 lb)   BMI 25.24 kg/m²    GEN: well developed, well nourished, no acute distress   HENT: Normocephalic, atraumatic   EYES: No discharge, conjunctiva normal   NECK: Supple, non-tender   PULM: No wheezing, no respiratory distress   CV: RRR   ABD: Soft, non-tender    ORTHO EXAM:   Examination of the left hand reveals that there is an obvious Dupuytren's cord in the palm of the hand.  There are no other skin changes noted.  Palpation produces no areas of specific tenderness. He does have changes consistent with " rheumatoid versus osteoarthritis of all the fingers.  The PIP and DIP joints are mainly involved.  Attempts at flexion and extension reveals that he has limited flexion at the IP joints of any of his fingers the worse being the ring and the middle finger.  PIP motion is 0 to approximately 40-45 degrees for both the middle and ring fingers.  Attempts at composite fist leave him half a cm short of his distal palmar crease. He does have sensation which is grossly intact in the median radial ulnar distribution and capillary refill less than 2 sec in the digits    RADIOLOGY:   X-rays of the left hand were taken in clinic today. He is noted have severe degenerative changes about multiple joints in his hand.  The most significant or of the middle and ring finger PIP joints. All the PIP joints are involved as well as the DIP joint.  He has CMC joint dysfunction as well.    ASSESSMENT:   Bilateral hand arthritis    PLAN:  1.  I will send the patient for a consultation with a rheumatologist is he does have mainly PIP arthritis    2.  I discussed treatment options with the patient including PIP arthroplasty and centralization of the extensor tendons.  States that he does not want to do any of those procedures at this time.    3.  He will follow up with me on a p.r.n. basis

## 2020-01-13 NOTE — LETTER
January 13, 2020      Maddi Gastelum DO  [address] [address]  [address]  [facility] [address]           [facility]  [address] [place] [address]  Phone: [phone]          Patient: Eric Buitrago   MR Number: [id number]   YOB: 1945   Date of Visit: 1/13/2020       Dear Dr. Maddi Gastelum:    Thank you for referring Eric Buitrago to me for evaluation. Attached you will find relevant portions of my assessment and plan of care.    If you have questions, please do not hesitate to call me. I look forward to following Eric Buitrago along with you.    Sincerely,    Paulo Dinh MD    Enclosure  CC:  No Recipients    If you would like to receive this communication electronically, please contact [email or web address] or [phone] to request more information on [employer] Link access.    For providers and/or their staff who would like to refer a patient to [facility], please contact us through our one-stop-shop provider referral line, Dimitris Cota, at [phone].    If you feel you have received this communication in error or would no longer like to receive these types of communications, please e-mail [email or web address]

## 2020-01-13 NOTE — PATIENT INSTRUCTIONS
Rheumatoid Arthritis  You have rheumatoid arthritis (RA). This is a chronic disease that mainly affects the joints. Sometimes, it also affects other parts of the body. RA is an autoimmune disease. This means that the bodys immune system, which normally protects the body, causes harm instead. With RA, the immune system attacks the joints. The reason for this is unknown.  In most cases, RA affects pairs of joints on both sides of the body. These can include joints in both elbows, wrists, hands, knees, feet, or ankles. The disease often starts slowly. Early symptoms include stiffness, muscle aches, weakness, and fatigue. Over time, the joints may begin to hurt. They may also become warm, swollen, or tender. Symptoms may feel worse in the morning after a nights rest and may get better with activity.  With RA, you may have periods of active disease (when symptoms worsen) followed by periods of remission (when symptoms improve or go away). There is no known cure for RA. But medical treatment can slow or stop the progress of the disease. It can also help relieve symptoms. For advanced disease, surgery, such as joint replacement, may be the best option.  Home care  · If you were prescribed a medicine, take it as directed.  · To help control swelling and pain, acetaminophen, ibuprofen, or another NSAID (non-steroidal anti-inflammatory drug) may be recommended. Note: If you have chronic liver or kidney disease or ever had a stomach ulcer or gastrointestinal bleeding, tell your healthcare provider before taking any of these medicines.  · Some persons find relief with heat (hot shower, hot bath, or heating pad). Others prefer cold (ice in a plastic bag, wrapped in a towel). Try both. Then use the method you like best. Use heat or cold for about 20 minutes, a few times a day.  · Exercise is a key part of treatment for RA. It helps reduce pain. It may also improve flexibility. Do your best to be active daily. Move your joints  through their full range of motion each morning. Avoid staying in any one position for long periods of time. Take breaks throughout the day and move around. Also, ask your healthcare provider or physical therapist what exercises are best for you.  · If you are overweight, lose weight. Extra weight puts stress on your joints.  · If you smoke, quit. Smoking raises the risk of other problems linked to RA.  · No herbal product or nutritional supplement has been proven to help RA. But treatments such as acupuncture and massage may help relieve pain.  · Talk to your healthcare provider or occupational therapist about easier ways to perform daily tasks. This may include the use of assistive devices. These are special tools that can help with things like dressing, bathing, cooking, driving, and moving or getting around.  Follow-up care  Follow up with your healthcare provider, or as advised.   When to seek medical advice  Call your healthcare provider right away if any of these occur:  · Increasing weakness, pale color of the skin, fainting  · Chest pain or shortness of breath  · Blood in vomit or stool (black or red color)  · Changes in vision  · Skin ulcers  · Fever of 100.4ºF (38ºC) or higher, or as directed by your healthcare provider  · New joint pain  · New rash  Resources  To learn more about RA, contact:  · Arthritis Foundation, 217.357.5473, www.arthritis.org  · National [place] of Arthritis and Musculoskeletal and Skin Diseases (NIAMS), 368.543.9406, www.niams.nih.gov     Date Last Reviewed: 3/1/2017  © 9737-7590 [employer]. [address] [place] [address], [place] [address]. All rights reserved. This information is not intended as a substitute for professional medical care. Always follow your healthcare professional's instructions.        Understanding Carpometacarpal Osteoarthritis    The base of the thumb where it meets the hand is called the carpometacarpal (CMC) joint. This joint allows the thumb to  move freely in many directions. It also provides strength so the hand can grasp and .  A smooth tissue called cartilage lines and cushions the bones of the CMC joint. Using the thumb puts stress on the joint. Over time, this can lead to the breakdown of the cartilage in the joint. This is known as osteoarthritis. With this condition, bones of the joint may be exposed and rub together. They may become irritated and rough. This keeps the joint from moving smoothly and can lead to pain.     How to say it  BURAK--met--BURAK-stefan      What causes CMC joint osteoarthritis?    This type of osteoarthritis is mainly caused by years of using the hand and thumb. The condition may be more likely if you:  · Regularly do things that put great stress on the thumb joint  · Have had previous thumb injuries  · Have weakened or loose structures in the thumb  · Are a woman who is past menopause  Symptoms of CMC joint osteoarthritis  Symptoms commonly include:  · Thumb pain. It may get worse with pinching or gripping.  · Thumb weakness  · Sounds of grinding or popping in the thumb joint  · Base of the thumb is enlarged   Treatment for CMC joint osteoarthritis  Osteoarthritis is a long-term (chronic) condition. Treatment focuses on managing symptoms. It may include:  · Taking prescription or over-the-counter pain medicines to help reduce pain and swelling  · Using a brace to rest and support the thumb joint  · Using hot or cold therapy to help relieve pain  · Learning and practicing ways to reduce stress on the thumb joint  · Using devices that help protect the joint. These include jar openers, pen , and spring-action scissors.  · Following a plan of physical therapy and exercises. This will help improve the flexibility and strength of the hand and thumb.  · Getting shots of medicine into the joint to help relieve symptoms for a time  If these treatments dont do enough to relieve severe pain, you may need surgery. There are  several different types of surgery. In general, the goal is to relieve pain and help you to be able to use the hand.     When to call your healthcare provider  Call your healthcare provider right away if you have any of these:  · Fever of 100.4°F (38°C) or higher, or as directed  · Symptoms that dont get better, or get worse  · New symptoms   Date Last Reviewed: 3/10/2016  © 5782-9459 [facility]. [address] [place] [address], [place] [address]. All rights reserved. This information is not intended as a substitute for professional medical care. Always follow your healthcare professional's instructions.

## 2020-01-27 RX ORDER — GABAPENTIN 300 MG/1
CAPSULE ORAL
Qty: 30 CAPSULE | Refills: 6 | Status: SHIPPED | OUTPATIENT
Start: 2020-01-27 | End: 2020-08-25

## 2020-04-08 DIAGNOSIS — F51.01 PRIMARY INSOMNIA: ICD-10-CM

## 2020-04-08 RX ORDER — TEMAZEPAM 15 MG/1
15 CAPSULE ORAL NIGHTLY
Qty: 30 CAPSULE | Refills: 2 | Status: SHIPPED | OUTPATIENT
Start: 2020-04-08 | End: 2020-11-17 | Stop reason: SDUPTHER

## 2020-05-11 DIAGNOSIS — I10 ESSENTIAL HYPERTENSION: ICD-10-CM

## 2020-05-11 RX ORDER — LOSARTAN POTASSIUM 25 MG/1
TABLET ORAL
Qty: 90 TABLET | Refills: 3 | Status: SHIPPED | OUTPATIENT
Start: 2020-05-11 | End: 2020-06-25

## 2020-06-12 ENCOUNTER — LAB VISIT (OUTPATIENT)
Dept: LAB | Facility: HOSPITAL | Age: 75
End: 2020-06-12
Attending: INTERNAL MEDICINE
Payer: MEDICARE

## 2020-06-12 DIAGNOSIS — I10 ESSENTIAL HYPERTENSION: ICD-10-CM

## 2020-06-12 LAB
ALBUMIN SERPL BCP-MCNC: 4.2 G/DL (ref 3.5–5.2)
ALP SERPL-CCNC: 60 U/L (ref 55–135)
ALT SERPL W/O P-5'-P-CCNC: 24 U/L (ref 10–44)
ANION GAP SERPL CALC-SCNC: 7 MMOL/L (ref 8–16)
AST SERPL-CCNC: 20 U/L (ref 10–40)
BASOPHILS # BLD AUTO: 0.03 K/UL (ref 0–0.2)
BASOPHILS NFR BLD: 0.5 % (ref 0–1.9)
BILIRUB SERPL-MCNC: 0.8 MG/DL (ref 0.1–1)
BUN SERPL-MCNC: 26 MG/DL (ref 8–23)
CALCIUM SERPL-MCNC: 9.1 MG/DL (ref 8.7–10.5)
CHLORIDE SERPL-SCNC: 108 MMOL/L (ref 95–110)
CHOLEST SERPL-MCNC: 118 MG/DL (ref 120–199)
CHOLEST/HDLC SERPL: 2.7 {RATIO} (ref 2–5)
CO2 SERPL-SCNC: 26 MMOL/L (ref 23–29)
CREAT SERPL-MCNC: 1 MG/DL (ref 0.5–1.4)
DIFFERENTIAL METHOD: NORMAL
EOSINOPHIL # BLD AUTO: 0.4 K/UL (ref 0–0.5)
EOSINOPHIL NFR BLD: 5.9 % (ref 0–8)
ERYTHROCYTE [DISTWIDTH] IN BLOOD BY AUTOMATED COUNT: 13.8 % (ref 11.5–14.5)
EST. GFR  (AFRICAN AMERICAN): >60 ML/MIN/1.73 M^2
EST. GFR  (NON AFRICAN AMERICAN): >60 ML/MIN/1.73 M^2
GLUCOSE SERPL-MCNC: 108 MG/DL (ref 70–110)
HCT VFR BLD AUTO: 47 % (ref 40–54)
HDLC SERPL-MCNC: 43 MG/DL (ref 40–75)
HDLC SERPL: 36.4 % (ref 20–50)
HGB BLD-MCNC: 15.2 G/DL (ref 14–18)
IMM GRANULOCYTES # BLD AUTO: 0.03 K/UL (ref 0–0.04)
IMM GRANULOCYTES NFR BLD AUTO: 0.5 % (ref 0–0.5)
LDLC SERPL CALC-MCNC: 59.8 MG/DL (ref 63–159)
LYMPHOCYTES # BLD AUTO: 2.2 K/UL (ref 1–4.8)
LYMPHOCYTES NFR BLD: 33.2 % (ref 18–48)
MCH RBC QN AUTO: 30.8 PG (ref 27–31)
MCHC RBC AUTO-ENTMCNC: 32.3 G/DL (ref 32–36)
MCV RBC AUTO: 95 FL (ref 82–98)
MONOCYTES # BLD AUTO: 0.6 K/UL (ref 0.3–1)
MONOCYTES NFR BLD: 8.3 % (ref 4–15)
NEUTROPHILS # BLD AUTO: 3.4 K/UL (ref 1.8–7.7)
NEUTROPHILS NFR BLD: 51.6 % (ref 38–73)
NONHDLC SERPL-MCNC: 75 MG/DL
NRBC BLD-RTO: 0 /100 WBC
PLATELET # BLD AUTO: 212 K/UL (ref 150–350)
PMV BLD AUTO: 10.9 FL (ref 9.2–12.9)
POTASSIUM SERPL-SCNC: 4.9 MMOL/L (ref 3.5–5.1)
PROT SERPL-MCNC: 7.2 G/DL (ref 6–8.4)
RBC # BLD AUTO: 4.94 M/UL (ref 4.6–6.2)
SODIUM SERPL-SCNC: 141 MMOL/L (ref 136–145)
TRIGL SERPL-MCNC: 76 MG/DL (ref 30–150)
TSH SERPL DL<=0.005 MIU/L-ACNC: 3.65 UIU/ML (ref 0.4–4)
WBC # BLD AUTO: 6.62 K/UL (ref 3.9–12.7)

## 2020-06-12 PROCEDURE — 84443 ASSAY THYROID STIM HORMONE: CPT

## 2020-06-12 PROCEDURE — 80061 LIPID PANEL: CPT

## 2020-06-12 PROCEDURE — 80053 COMPREHEN METABOLIC PANEL: CPT

## 2020-06-12 PROCEDURE — 36415 COLL VENOUS BLD VENIPUNCTURE: CPT | Mod: PN

## 2020-06-12 PROCEDURE — 85025 COMPLETE CBC W/AUTO DIFF WBC: CPT

## 2020-06-16 ENCOUNTER — OFFICE VISIT (OUTPATIENT)
Dept: FAMILY MEDICINE | Facility: CLINIC | Age: 75
End: 2020-06-16
Payer: MEDICARE

## 2020-06-16 VITALS
HEIGHT: 71 IN | BODY MASS INDEX: 25.18 KG/M2 | SYSTOLIC BLOOD PRESSURE: 140 MMHG | HEART RATE: 96 BPM | WEIGHT: 179.88 LBS | DIASTOLIC BLOOD PRESSURE: 78 MMHG | TEMPERATURE: 98 F | OXYGEN SATURATION: 98 %

## 2020-06-16 DIAGNOSIS — M19.042 PRIMARY OSTEOARTHRITIS OF BOTH HANDS: ICD-10-CM

## 2020-06-16 DIAGNOSIS — K58.9 IRRITABLE BOWEL SYNDROME, UNSPECIFIED TYPE: ICD-10-CM

## 2020-06-16 DIAGNOSIS — E03.9 HYPOTHYROIDISM, UNSPECIFIED TYPE: ICD-10-CM

## 2020-06-16 DIAGNOSIS — F51.01 PRIMARY INSOMNIA: ICD-10-CM

## 2020-06-16 DIAGNOSIS — I10 ESSENTIAL HYPERTENSION: Primary | ICD-10-CM

## 2020-06-16 DIAGNOSIS — M19.90 ARTHRITIS: ICD-10-CM

## 2020-06-16 DIAGNOSIS — M19.041 PRIMARY OSTEOARTHRITIS OF BOTH HANDS: ICD-10-CM

## 2020-06-16 DIAGNOSIS — N40.0 BENIGN NON-NODULAR PROSTATIC HYPERPLASIA WITHOUT LOWER URINARY TRACT SYMPTOMS: ICD-10-CM

## 2020-06-16 DIAGNOSIS — E78.5 HYPERLIPIDEMIA, UNSPECIFIED HYPERLIPIDEMIA TYPE: ICD-10-CM

## 2020-06-16 PROCEDURE — 99214 OFFICE O/P EST MOD 30 MIN: CPT | Mod: S$GLB,,, | Performed by: INTERNAL MEDICINE

## 2020-06-16 PROCEDURE — 99214 PR OFFICE/OUTPT VISIT, EST, LEVL IV, 30-39 MIN: ICD-10-PCS | Mod: S$GLB,,, | Performed by: INTERNAL MEDICINE

## 2020-06-16 RX ORDER — DOXEPIN HYDROCHLORIDE 50 MG/1
150 CAPSULE ORAL NIGHTLY
Qty: 90 CAPSULE | Refills: 6 | Status: SHIPPED | OUTPATIENT
Start: 2020-06-16 | End: 2021-04-09 | Stop reason: SDUPTHER

## 2020-06-16 NOTE — PROGRESS NOTES
Venipuncture performed with 21 gauge butterfly, x's 1 attempt,  to R Basilic vein without success.  Referred to lab.    Pressure dressing applied to site, instructed patient to remove dressing in 10-15 minutes, OK to re-adjust dressing if pressure causing any discomfort, to observe closely for numbness and/or discoloration to hand or fingers, and to notify provider if bleeding persists after applying constant pressure lasting 30 minutes.

## 2020-06-16 NOTE — PROGRESS NOTES
Subjective:       Patient ID: Eric Buitrago is a 74 y.o. male.    Medication List with Changes/Refills   New Medications    DOXEPIN (SINEQUAN) 50 MG CAPSULE    Take 3 capsules (150 mg total) by mouth every evening.   Current Medications    AZELASTINE (ASTELIN) 137 MCG (0.1 %) NASAL SPRAY    USE 1 SPRAY IN EACH NOSTRIL TWICE DAILY    FEXOFENADINE (ALLEGRA) 60 MG TABLET    Take 1 tablet by mouth.    GABAPENTIN (NEURONTIN) 300 MG CAPSULE    TAKE 1 CAPSULE(300 MG) BY MOUTH EVERY EVENING    LEVOTHYROXINE (SYNTHROID) 137 MCG TAB TABLET    TAKE 1 TABLET(137 MCG) BY MOUTH BEFORE BREAKFAST    LOSARTAN (COZAAR) 25 MG TABLET    TAKE 1 TABLET(25 MG) BY MOUTH EVERY DAY    MELOXICAM (MOBIC) 15 MG TABLET    TAKE 1 TABLET(15 MG) BY MOUTH EVERY DAY    ROSUVASTATIN (CRESTOR) 5 MG TABLET    TAKE 1 TABLET(5 MG) BY MOUTH EVERY DAY    TAMSULOSIN (FLOMAX) 0.4 MG CAP    TAKE 1 CAPSULE BY MOUTH EVERY EVENING    TEMAZEPAM (RESTORIL) 15 MG CAP    TAKE 1 CAPSULE(15 MG) BY MOUTH EVERY NIGHT AS NEEDED   Discontinued Medications    DOXEPIN (SINEQUAN) 150 MG CAP    TAKE 1 CAPSULE BY MOUTH EVERY DAY       Chief Complaint: Follow-up  He is here today to f/u on chronic medical issues.       He has hypertension and is taking losartan 25 mg qday.  He denies any CAD. He denies chest pain or shortness of breath. He does not check his BP at home.     He has hyperlipidemia and is taking crestor 5 mg qday. His last lipids on 6/2020 were 118/76/43/59.      He has hypothyroid that is controlled on levothyroxine 137 mcg qday. His last TSH was normal on 6/2020.      He has BPH which is controlled with flomax. He says this helps with his frequency and nocturia.      He has seasonal allergies controlled with allegra and astelin which he takes daily.  Since starting to take daily he has no congestion and has not had sinus infections.         He has IBS for many years. He was seen by GI about 10 years ago and started on doxepin for spastic colon.  He says this  has worked and he no longer has any symptoms.  His most recent colonoscopy on 2/2018 had no polyps but did show mild colonic spasms. He is having difficulty getting his doxepin.      He has insomnia that is controlled with PRN temazepam 15 mg 1/2 pill qhs 2-3 times a week. He says it works and denies side effects.  Last fill was #30 on 4/8/2020.       He occasionally has low back pain if he over exerts himself. He will take mobic with good relief.  He is pain free today. Pain worse with bending or lifting.  Better with stretching and rest.       He has continue left shoulder pain with intermittent radiation down left arm. He was seen by pain management and had an MRI on 1/2019 showing Multilevel spondylosis, greatest at C5-6 where there is mild cord flattening with effacement of ventral and dorsal CSF.  No cord signal abnormality.Multilevel foraminal stenosis, greatest at C5-6 where it is moderate-severe bilaterally. Moderate multilevel hypertrophic facet arthropathy.  He was seen by pain and started on gabaepntin 300 mg qhs which has helped with his pain.  If worsens then pain would like to do a cervical steroid injection.  He completed PT without much difference in his pain.  He reports he is pain free during the day but at night has increased symptoms. He does feel gabapentin helps.      He has osteoarthritis of both hands left > right. 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 He says years ago he had a steroid injection in his thumb that helped a similar problem.  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       He is very active and enjoys working in his yard. He goes to the gym 3 times a week for one hour and does treadmill and weight training. He does eat healthy.      Colonoscopy---2/2018 repeat in 10 years  Tdap---9/2017  Influenza vaccine---12/2019  Pneumovax  "23----6/2018  Prevnar 13----6/2017  Shingles vaccine----- 10/2011, 11/2018, 1/2019     Review of Systems   Constitutional: Negative for appetite change, fatigue, fever and unexpected weight change.   HENT: Negative for congestion, ear pain, hearing loss, sore throat and trouble swallowing.    Eyes: Negative for pain and visual disturbance.   Respiratory: Negative for cough, chest tightness, shortness of breath and wheezing.    Cardiovascular: Negative for chest pain, palpitations and leg swelling.   Gastrointestinal: Negative for abdominal pain, blood in stool, constipation, diarrhea, nausea and vomiting.   Endocrine: Negative for polyuria.   Genitourinary: Negative for dysuria and hematuria.   Musculoskeletal: Positive for arthralgias. Negative for back pain and myalgias.   Skin: Negative for rash.   Neurological: Negative for dizziness, weakness, numbness and headaches.   Hematological: Does not bruise/bleed easily.   Psychiatric/Behavioral: Positive for sleep disturbance. Negative for dysphoric mood and suicidal ideas. The patient is not nervous/anxious.        Objective:      Vitals:    06/16/20 0948   BP: (!) 140/78   Pulse: 96   Temp: 98.2 °F (36.8 °C)   TempSrc: Tympanic   SpO2: 98%   Weight: 81.6 kg (179 lb 14.3 oz)   Height: 5' 11" (1.803 m)     Body mass index is 25.09 kg/m².  Physical Exam    General appearance: No acute distress, cooperative  Eyes: PERRL, EOMI, conjunctiva clear  HEENT: ears normal, nose without rhinorrhea, normal turbinates,mouth no sores or lesions, throat no erythema or pus  Neck: FROM, soft, supple, no thyromegaly, no bruits  Lymph: no anterior or posterior cervical adenopathy  Heart::  Regular rate and rhythm, no murmur  Lung: Clear to ascultation bilaterally, no wheezing, no rales, no rhonchi, no distress  Abdomen: Soft, nontender, no distention, no hepatosplenomegaly, bowel sounds normal, no guarding, no rebound, no peritoneal signs  Skin: no rashes, no lesions  Extremities: no " edema, no cyanosis  Neuro: no focal abnormalities, strength 5/5 b/l UE and LE, 2+ DTRs b/l UE and LE, normal gait  Peripheral pulses: 2+ pedal pulses bilaterally, good perfusion and color  Musculoskeletal: FROM, good strenth, no tenderness  Joint: deformity of DIP and MCP without inflammation or erythema    Assessment:       1. Essential hypertension    2. Hyperlipidemia, unspecified hyperlipidemia type    3. Hypothyroidism, unspecified type    4. Primary insomnia    5. Irritable bowel syndrome, unspecified type    6. Benign non-nodular prostatic hyperplasia without lower urinary tract symptoms    7. Primary osteoarthritis of both hands    8. Arthritis        Plan:       Essential hypertension  Elevated today and he has not been checking at home.  He will f/u in one week with his home BP machine with the nurse.     Hyperlipidemia, unspecified hyperlipidemia type  Good control on crestor.     Hypothyroidism, unspecified type  Good control on this dose of levothyroxine.     Primary insomnia  Controlled on remeron as needed.     Irritable bowel syndrome, unspecified type  Good control on doxepin.  He is having trouble getting doxepin 150 mg qday. Will change to 50 mg 3 tablets qhs.  He will try to wean to 100 mg qhs.   -     doxepin (SINEQUAN) 50 MG capsule; Take 3 capsules (150 mg total) by mouth every evening.  Dispense: 90 capsule; Refill: 6    Benign non-nodular prostatic hyperplasia without lower urinary tract symptoms  Good control on flomax.     Primary osteoarthritis of both hands with Arthritis  Concern for psoriatic arthritis vs rheumatoid arthritis. Will check labs and refer to rheumatology. He had recent hand films.   -     NADER Screen w/Reflex  -     C-Reactive Protein  -     Rheumatoid factor  -     Sedimentation rate  -     ANTI-SSB ANTIBODY  -     CYCLIC CITRUL PEPTIDE ANTIBODY, IGG  -     Ambulatory referral/consult to Rheumatology; Future; Expected date: 06/23/2020    Follow up in about 6 months  (around 12/16/2020) for chronic medical issues and one week with nurse for BP check.

## 2020-06-17 NOTE — PROGRESS NOTES
Venipuncture performed with 25 gauge butterfly, x's 2 attempt,  To right and left anticubital vein, without success. Pt scheduled in [facility] lab at 6/16/2020     Pressure dressing applied to site, instructed patient to remove dressing in 10-15 minutes, OK to re-adjust dressing if pressure causing any discomfort, to observe closely for numbness and/or discoloration to hand or fingers, and to notify provider if bleeding persists after applying constant pressure lasting 30 minutes.

## 2020-06-18 ENCOUNTER — LAB VISIT (OUTPATIENT)
Dept: LAB | Facility: HOSPITAL | Age: 75
End: 2020-06-18
Attending: INTERNAL MEDICINE
Payer: MEDICARE

## 2020-06-18 DIAGNOSIS — M19.90 ARTHRITIS: Primary | ICD-10-CM

## 2020-06-18 DIAGNOSIS — M19.90 ARTHRITIS: ICD-10-CM

## 2020-06-18 LAB
CCP AB SER IA-ACNC: <0.5 U/ML
CRP SERPL-MCNC: 0.6 MG/L (ref 0–8.2)
ERYTHROCYTE [SEDIMENTATION RATE] IN BLOOD BY WESTERGREN METHOD: <2 MM/HR (ref 0–23)
RHEUMATOID FACT SERPL-ACNC: <10 IU/ML (ref 0–15)

## 2020-06-18 PROCEDURE — 85652 RBC SED RATE AUTOMATED: CPT

## 2020-06-18 PROCEDURE — 86200 CCP ANTIBODY: CPT

## 2020-06-18 PROCEDURE — 36415 COLL VENOUS BLD VENIPUNCTURE: CPT | Mod: PN

## 2020-06-18 PROCEDURE — 86431 RHEUMATOID FACTOR QUANT: CPT

## 2020-06-18 PROCEDURE — 86140 C-REACTIVE PROTEIN: CPT

## 2020-06-18 PROCEDURE — 86235 NUCLEAR ANTIGEN ANTIBODY: CPT

## 2020-06-18 PROCEDURE — 86038 ANTINUCLEAR ANTIBODIES: CPT

## 2020-06-19 ENCOUNTER — TELEPHONE (OUTPATIENT)
Dept: FAMILY MEDICINE | Facility: CLINIC | Age: 75
End: 2020-06-19

## 2020-06-19 LAB — ANA SER QL IF: NORMAL

## 2020-06-19 NOTE — TELEPHONE ENCOUNTER
Please let him know that his workup for other causes for his arthritis is so far negative.     Thanks

## 2020-06-24 ENCOUNTER — CLINICAL SUPPORT (OUTPATIENT)
Dept: FAMILY MEDICINE | Facility: CLINIC | Age: 75
End: 2020-06-24
Payer: MEDICARE

## 2020-06-24 ENCOUNTER — TELEPHONE (OUTPATIENT)
Dept: FAMILY MEDICINE | Facility: CLINIC | Age: 75
End: 2020-06-24

## 2020-06-24 VITALS — SYSTOLIC BLOOD PRESSURE: 144 MMHG | HEART RATE: 77 BPM | DIASTOLIC BLOOD PRESSURE: 84 MMHG

## 2020-06-24 DIAGNOSIS — H61.21 IMPACTED CERUMEN OF RIGHT EAR: Primary | ICD-10-CM

## 2020-06-24 LAB
ANTI-SSB ANTIBODY: 0.11 RATIO (ref 0–0.99)
ANTI-SSB INTERPRETATION: NEGATIVE

## 2020-06-24 NOTE — PROGRESS NOTES
Pt here for BP check.    Eric Buitrago 74 y.o. male is here today for Blood Pressure check.   History of HTN yes.    Review of patient's allergies indicates:   Allergen Reactions    No known drug allergies      Creatinine   Date Value Ref Range Status   06/12/2020 1.0 0.5 - 1.4 mg/dL Final     Sodium   Date Value Ref Range Status   06/12/2020 141 136 - 145 mmol/L Final     Potassium   Date Value Ref Range Status   06/12/2020 4.9 3.5 - 5.1 mmol/L Final   ]  Patient verifies taking blood pressure medications on a regular basis at the same time of the day.     Current Outpatient Medications:     azelastine (ASTELIN) 137 mcg (0.1 %) nasal spray, USE 1 SPRAY IN EACH NOSTRIL TWICE DAILY, Disp: 30 mL, Rfl: 11    doxepin (SINEQUAN) 50 MG capsule, Take 3 capsules (150 mg total) by mouth every evening., Disp: 90 capsule, Rfl: 6    fexofenadine (ALLEGRA) 60 MG tablet, Take 1 tablet by mouth., Disp: , Rfl:     gabapentin (NEURONTIN) 300 MG capsule, TAKE 1 CAPSULE(300 MG) BY MOUTH EVERY EVENING, Disp: 30 capsule, Rfl: 6    levothyroxine (SYNTHROID) 137 MCG Tab tablet, TAKE 1 TABLET(137 MCG) BY MOUTH BEFORE BREAKFAST, Disp: 90 tablet, Rfl: 3    losartan (COZAAR) 25 MG tablet, TAKE 1 TABLET(25 MG) BY MOUTH EVERY DAY, Disp: 90 tablet, Rfl: 3    meloxicam (MOBIC) 15 MG tablet, TAKE 1 TABLET(15 MG) BY MOUTH EVERY DAY, Disp: 90 tablet, Rfl: 3    rosuvastatin (CRESTOR) 5 MG tablet, TAKE 1 TABLET(5 MG) BY MOUTH EVERY DAY, Disp: 90 tablet, Rfl: 3    tamsulosin (FLOMAX) 0.4 mg Cap, TAKE 1 CAPSULE BY MOUTH EVERY EVENING, Disp: 90 capsule, Rfl: 3    temazepam (RESTORIL) 15 mg Cap, TAKE 1 CAPSULE(15 MG) BY MOUTH EVERY NIGHT AS NEEDED, Disp: 30 capsule, Rfl: 2  Does patient have record of home blood pressure readings no.   Last dose of blood pressure medication was taken at 10:00 pm last night   Patient is asymptomatic.         ,   .    Blood pressure reading after 15 minutes was 142/88 Pulse 77     Repeat BP after 15 min  144/84     notified.

## 2020-06-24 NOTE — TELEPHONE ENCOUNTER
Pt here today for BP check. /88. Pulse     Repeat BP after 15 min. 144/84   Pt takes Losartan 25 mg daily at night .  Pls advise.--lp

## 2020-06-25 RX ORDER — LOSARTAN POTASSIUM 50 MG/1
50 TABLET ORAL DAILY
Qty: 90 TABLET | Refills: 3 | Status: SHIPPED | OUTPATIENT
Start: 2020-06-25 | End: 2021-07-10

## 2020-06-25 NOTE — TELEPHONE ENCOUNTER
Please have him increase the losartan to 50 mg (2 of the 25 mg tablets until gone and I will send a rx for the higher dose to the pharm).     Then recheck BP with nurse in 2 weeks.  Please schedule.     Thanks

## 2020-07-08 ENCOUNTER — TELEPHONE (OUTPATIENT)
Dept: FAMILY MEDICINE | Facility: CLINIC | Age: 75
End: 2020-07-08

## 2020-07-09 ENCOUNTER — CLINICAL SUPPORT (OUTPATIENT)
Dept: FAMILY MEDICINE | Facility: CLINIC | Age: 75
End: 2020-07-09
Payer: MEDICARE

## 2020-07-09 VITALS — DIASTOLIC BLOOD PRESSURE: 78 MMHG | SYSTOLIC BLOOD PRESSURE: 162 MMHG

## 2020-07-09 DIAGNOSIS — I10 ESSENTIAL HYPERTENSION: Primary | ICD-10-CM

## 2020-07-09 NOTE — PROGRESS NOTES
/72       Blood pressure reading after 15 minutes was 162/78, Pulse 88.  Dr. Gasetlum notified.  Instructions were given to monitor blood pressure at home twice daily and drop readings off to the office on Wednesday.  Patient verbalized understanding.    Eric Buitrago 75 y.o. male is here today for Blood Pressure check.   History of HTN yes.    Review of patient's allergies indicates:   Allergen Reactions    No known drug allergies      Creatinine   Date Value Ref Range Status   06/12/2020 1.0 0.5 - 1.4 mg/dL Final     Sodium   Date Value Ref Range Status   06/12/2020 141 136 - 145 mmol/L Final     Potassium   Date Value Ref Range Status   06/12/2020 4.9 3.5 - 5.1 mmol/L Final   ]  Patient verifies taking blood pressure medications on a regular basis at the same time of the day.     Current Outpatient Medications:     azelastine (ASTELIN) 137 mcg (0.1 %) nasal spray, USE 1 SPRAY IN EACH NOSTRIL TWICE DAILY, Disp: 30 mL, Rfl: 11    doxepin (SINEQUAN) 50 MG capsule, Take 3 capsules (150 mg total) by mouth every evening., Disp: 90 capsule, Rfl: 6    fexofenadine (ALLEGRA) 60 MG tablet, Take 1 tablet by mouth., Disp: , Rfl:     gabapentin (NEURONTIN) 300 MG capsule, TAKE 1 CAPSULE(300 MG) BY MOUTH EVERY EVENING, Disp: 30 capsule, Rfl: 6    levothyroxine (SYNTHROID) 137 MCG Tab tablet, TAKE 1 TABLET(137 MCG) BY MOUTH BEFORE BREAKFAST, Disp: 90 tablet, Rfl: 3    losartan (COZAAR) 50 MG tablet, Take 1 tablet (50 mg total) by mouth once daily., Disp: 90 tablet, Rfl: 3    meloxicam (MOBIC) 15 MG tablet, TAKE 1 TABLET(15 MG) BY MOUTH EVERY DAY, Disp: 90 tablet, Rfl: 3    rosuvastatin (CRESTOR) 5 MG tablet, TAKE 1 TABLET(5 MG) BY MOUTH EVERY DAY, Disp: 90 tablet, Rfl: 3    tamsulosin (FLOMAX) 0.4 mg Cap, TAKE 1 CAPSULE BY MOUTH EVERY EVENING, Disp: 90 capsule, Rfl: 3    temazepam (RESTORIL) 15 mg Cap, TAKE 1 CAPSULE(15 MG) BY MOUTH EVERY NIGHT AS NEEDED, Disp: 30 capsule, Rfl: 2  Does patient have record of  home blood pressure readings no. Readings have been averaging n/a.   Last dose of blood pressure medication was taken at 2200 last pm.  Patient is asymptomatic.   Complains of n/a.

## 2020-07-15 ENCOUNTER — TELEPHONE (OUTPATIENT)
Dept: FAMILY MEDICINE | Facility: CLINIC | Age: 75
End: 2020-07-15

## 2020-07-15 NOTE — TELEPHONE ENCOUNTER
Pt brought in blood pressure readings;    7/9  9pm 153/77 P 68  7/10 12:20 128/67 P 73  7/10 10p 142/76 P 73   7/11 5:30 125/72 P 75  7/12 noon 115/69 P 74  7/12 9  131/74 P 75   7/13 1:30 132/74 P 73  7/13 9:30 148/75 P 66  7/14 1  134/70 P 74  7/14 9  141/81 P 66

## 2020-07-16 RX ORDER — AMLODIPINE BESYLATE 5 MG/1
5 TABLET ORAL DAILY
Qty: 90 TABLET | Refills: 3 | Status: SHIPPED | OUTPATIENT
Start: 2020-07-16 | End: 2021-04-12

## 2020-07-16 NOTE — TELEPHONE ENCOUNTER
Please advise patient to continue to use losartan at 50 mg a day and will add amlodipine 5 mg qday.     Call with BP readings in one week.     Thanks

## 2020-07-17 NOTE — TELEPHONE ENCOUNTER
----- Message from Elvira Joseph sent at 7/17/2020 11:50 AM CDT -----  Contact: [phone]/Self  Patient is requesting to speak with nurse regarding his medication change. Please advise.

## 2020-07-23 ENCOUNTER — TELEPHONE (OUTPATIENT)
Dept: FAMILY MEDICINE | Facility: CLINIC | Age: 75
End: 2020-07-23

## 2020-07-23 NOTE — TELEPHONE ENCOUNTER
----- Message from Lu Solorzano sent at 7/23/2020 10:42 AM CDT -----  Regarding: results of his readings  Contact: Eric rodriguez  Type: Needs Medical Advice  Who Called:  Eric rodriguez  Best Call Back Number: [phone]  Additional Information: Pls call pt back regarding the readings of his blood pressure

## 2020-10-08 ENCOUNTER — OFFICE VISIT (OUTPATIENT)
Dept: HOME HEALTH SERVICES | Facility: CLINIC | Age: 75
End: 2020-10-08
Payer: MEDICARE

## 2020-10-08 VITALS
HEIGHT: 71 IN | OXYGEN SATURATION: 98 % | DIASTOLIC BLOOD PRESSURE: 80 MMHG | HEART RATE: 83 BPM | BODY MASS INDEX: 24.5 KG/M2 | SYSTOLIC BLOOD PRESSURE: 146 MMHG | TEMPERATURE: 98 F | WEIGHT: 175 LBS

## 2020-10-08 DIAGNOSIS — Z00.00 ENCOUNTER FOR PREVENTIVE HEALTH EXAMINATION: Primary | ICD-10-CM

## 2020-10-08 DIAGNOSIS — E03.9 HYPOTHYROIDISM, UNSPECIFIED TYPE: ICD-10-CM

## 2020-10-08 DIAGNOSIS — E78.5 HYPERLIPIDEMIA, UNSPECIFIED HYPERLIPIDEMIA TYPE: ICD-10-CM

## 2020-10-08 DIAGNOSIS — I10 ESSENTIAL HYPERTENSION: ICD-10-CM

## 2020-10-08 DIAGNOSIS — M47.816 SPONDYLOSIS OF LUMBAR REGION WITHOUT MYELOPATHY OR RADICULOPATHY: ICD-10-CM

## 2020-10-08 DIAGNOSIS — F51.01 PRIMARY INSOMNIA: ICD-10-CM

## 2020-10-08 PROCEDURE — G0439 PPPS, SUBSEQ VISIT: HCPCS | Mod: S$GLB,,, | Performed by: NURSE PRACTITIONER

## 2020-10-08 PROCEDURE — G0439 PR MEDICARE ANNUAL WELLNESS SUBSEQUENT VISIT: ICD-10-PCS | Mod: S$GLB,,, | Performed by: NURSE PRACTITIONER

## 2020-10-09 NOTE — PATIENT INSTRUCTIONS
Counseling and Referral of Other Preventative  (Italic type indicates deductible and co-insurance are waived)    Patient Name: Eric Buitrago  Today's Date: 10/9/2020    Health Maintenance       Date Due Completion Date    Influenza Vaccine (1) 08/01/2020 12/12/2018    Lipid Panel 06/12/2021 6/12/2020    Colorectal Cancer Screening 02/21/2025 2/21/2018    TETANUS VACCINE 09/12/2027 9/12/2017        Orders Placed This Encounter   Procedures    Ambulatory referral/consult to Ophthalmology    Ambulatory referral/consult to Optometry     The following information is provided to all patients.  This information is to help you find resources for any of the problems found today that may be affecting your health:                Living healthy guide: www.[facility].[place].gov      Understanding Diabetes: www.diabetes.org      Eating healthy: www.cdc.gov/healthyweight      CDC home safety checklist: www.cdc.gov/steadi/patient.html      Agency on Aging: www.goea.[place].[facility]      Alcoholics anonymous (AA): www.aa.org      Physical Activity: www.adrienne.nih.gov/sf0ywts      Tobacco use: www.quitwithusla.org

## 2020-10-09 NOTE — PROGRESS NOTES
"  Eric Buitrago presented for a  Medicare AWV and comprehensive Health Risk Assessment today. The following components were reviewed and updated:    · Medical history  · Family History  · Social history  · Allergies and Current Medications  · Health Risk Assessment  · Health Maintenance  · Care Team     ** See Completed Assessments for Annual Wellness Visit within the encounter summary.**         The following assessments were completed:  · Living Situation  · CAGE  · Depression Screening  · Timed Get Up and Go  · Whisper Test  · Cognitive Function Screening  · Nutrition Screening  · ADL Screening  · PAQ Screening        Vitals:    10/08/20 1122   BP: (!) 146/80   Pulse: 83   Temp: 97.9 °F (36.6 °C)   SpO2: 98%   Weight: 79.4 kg (175 lb)   Height: 5' 11" (1.803 m)     Body mass index is 24.41 kg/m².  Physical Exam  Constitutional:       Appearance: Normal appearance.   HENT:      Head: Normocephalic and atraumatic.      Nose: Nose normal.   Eyes:      Extraocular Movements: Extraocular movements intact.      Pupils: Pupils are equal, round, and reactive to light.   Neck:      Musculoskeletal: Normal range of motion and neck supple.   Cardiovascular:      Rate and Rhythm: Normal rate and regular rhythm.      Pulses: Normal pulses.      Heart sounds: Normal heart sounds.   Pulmonary:      Effort: Pulmonary effort is normal.      Breath sounds: Normal breath sounds.   Neurological:      General: No focal deficit present.      Mental Status: He is alert and oriented to person, place, and time.               Diagnoses and health risks identified today and associated recommendations/orders:    1. Encounter for preventive health examination  AWV completed    2. Essential hypertension  Chronic and stable. Continue current treatment. Follow with PCP.     Ambulatory referral/consult to Ophthalmology; Future  - Ambulatory referral/consult to Optometry; Future    3. Hypothyroidism, unspecified type  Chronic and stable. Continue " current treatment. Follow with PCP.    4. Hyperlipidemia, unspecified hyperlipidemia type  Chronic and stable. Continue current treatment. Follow with PCP.    5. Primary insomnia  Chronic and stable. Continue current treatment. Follow with PCP.      6. Spondylosis of lumbar region without myelopathy or radiculopathy  Chronic and stable. Continue current treatment. Follow with PCP.    Provided Eric with a 5-10 year written screening schedule and personal prevention plan. Recommendations were developed using the USPSTF age appropriate recommendations. Education, counseling, and referrals were provided as needed. After Visit Summary printed and given to patient which includes a list of additional screenings\tests needed.    Fu in 1 yr for HUBERT Dias NP   I offered to discuss end of life issues, including information on how to make advance directives that the patient could use to name someone who would make medical decisions on their behalf if they became too ill to make themselves.    ___Patient declined  _X_Patient is interested, I provided paper work and offered to discuss.

## 2020-11-17 DIAGNOSIS — F51.01 PRIMARY INSOMNIA: ICD-10-CM

## 2020-11-17 RX ORDER — TEMAZEPAM 15 MG/1
15 CAPSULE ORAL NIGHTLY
Qty: 30 CAPSULE | Refills: 2 | Status: CANCELLED | OUTPATIENT
Start: 2020-11-17

## 2020-11-19 RX ORDER — TEMAZEPAM 15 MG/1
15 CAPSULE ORAL NIGHTLY
Qty: 30 CAPSULE | Refills: 2 | Status: SHIPPED | OUTPATIENT
Start: 2020-11-19 | End: 2021-04-09 | Stop reason: SDUPTHER

## 2020-12-01 DIAGNOSIS — J06.9 UPPER RESPIRATORY TRACT INFECTION, UNSPECIFIED TYPE: ICD-10-CM

## 2020-12-01 DIAGNOSIS — J01.80 OTHER ACUTE SINUSITIS, RECURRENCE NOT SPECIFIED: ICD-10-CM

## 2020-12-01 RX ORDER — AZELASTINE 1 MG/ML
1 SPRAY, METERED NASAL 2 TIMES DAILY
Qty: 30 ML | Refills: 11 | Status: SHIPPED | OUTPATIENT
Start: 2020-12-01 | End: 2022-01-25

## 2020-12-11 ENCOUNTER — TELEPHONE (OUTPATIENT)
Dept: OPHTHALMOLOGY | Facility: CLINIC | Age: 75
End: 2020-12-11

## 2020-12-11 NOTE — TELEPHONE ENCOUNTER
Left message to inform that Dr Pride will be out on Monday 12/14 and will not be able to see him. I let him know that I rescheduled him with Dr Dudley on 2/11 at 9:30am. Told him to let me know if he has a problem with this appt.

## 2021-01-09 ENCOUNTER — IMMUNIZATION (OUTPATIENT)
Dept: FAMILY MEDICINE | Facility: CLINIC | Age: 76
End: 2021-01-09
Payer: MEDICARE

## 2021-01-09 DIAGNOSIS — Z23 NEED FOR VACCINATION: ICD-10-CM

## 2021-01-09 PROCEDURE — 91300 COVID-19, MRNA, LNP-S, PF, 30 MCG/0.3 ML DOSE VACCINE: CPT | Mod: PBBFAC,PO

## 2021-01-30 ENCOUNTER — IMMUNIZATION (OUTPATIENT)
Dept: FAMILY MEDICINE | Facility: CLINIC | Age: 76
End: 2021-01-30
Payer: MEDICARE

## 2021-01-30 DIAGNOSIS — Z23 NEED FOR VACCINATION: Primary | ICD-10-CM

## 2021-01-30 PROCEDURE — 0002A COVID-19, MRNA, LNP-S, PF, 30 MCG/0.3 ML DOSE VACCINE: CPT | Mod: PBBFAC,PO

## 2021-01-30 PROCEDURE — 91300 COVID-19, MRNA, LNP-S, PF, 30 MCG/0.3 ML DOSE VACCINE: CPT | Mod: PBBFAC,PO

## 2021-02-11 ENCOUNTER — OFFICE VISIT (OUTPATIENT)
Dept: OPTOMETRY | Facility: CLINIC | Age: 76
End: 2021-02-11
Payer: COMMERCIAL

## 2021-02-11 DIAGNOSIS — H52.7 REFRACTIVE ERROR: Primary | ICD-10-CM

## 2021-02-11 DIAGNOSIS — I10 ESSENTIAL HYPERTENSION: ICD-10-CM

## 2021-02-11 DIAGNOSIS — H40.013 OPEN ANGLE WITH BORDERLINE FINDINGS AND LOW GLAUCOMA RISK IN BOTH EYES: ICD-10-CM

## 2021-02-11 DIAGNOSIS — H25.13 NUCLEAR SCLEROSIS OF BOTH EYES: ICD-10-CM

## 2021-02-11 PROCEDURE — 92015 PR REFRACTION: ICD-10-PCS | Mod: S$GLB,,, | Performed by: OPTOMETRIST

## 2021-02-11 PROCEDURE — 92133 POSTERIOR SEGMENT OCT OPTIC NERVE(OCULAR COHERENCE TOMOGRAPHY) - OU - BOTH EYES: ICD-10-PCS | Mod: 26,S$GLB,, | Performed by: OPTOMETRIST

## 2021-02-11 PROCEDURE — 92004 PR EYE EXAM, NEW PATIENT,COMPREHESV: ICD-10-PCS | Mod: S$GLB,,, | Performed by: OPTOMETRIST

## 2021-02-11 PROCEDURE — 99999 PR PBB SHADOW E&M-EST. PATIENT-LVL III: CPT | Mod: PBBFAC,,, | Performed by: OPTOMETRIST

## 2021-02-11 PROCEDURE — 92133 CPTRZD OPH DX IMG PST SGM ON: CPT | Mod: 26,S$GLB,, | Performed by: OPTOMETRIST

## 2021-02-11 PROCEDURE — 92004 COMPRE OPH EXAM NEW PT 1/>: CPT | Mod: S$GLB,,, | Performed by: OPTOMETRIST

## 2021-02-11 PROCEDURE — 92015 DETERMINE REFRACTIVE STATE: CPT | Mod: S$GLB,,, | Performed by: OPTOMETRIST

## 2021-02-11 PROCEDURE — 99999 PR PBB SHADOW E&M-EST. PATIENT-LVL III: ICD-10-PCS | Mod: PBBFAC,,, | Performed by: OPTOMETRIST

## 2021-03-23 RX ORDER — GABAPENTIN 300 MG/1
CAPSULE ORAL
Qty: 30 CAPSULE | Refills: 0 | Status: SHIPPED | OUTPATIENT
Start: 2021-03-23 | End: 2021-04-09 | Stop reason: SDUPTHER

## 2021-04-09 ENCOUNTER — OFFICE VISIT (OUTPATIENT)
Dept: FAMILY MEDICINE | Facility: CLINIC | Age: 76
End: 2021-04-09
Payer: MEDICARE

## 2021-04-09 VITALS
HEART RATE: 82 BPM | DIASTOLIC BLOOD PRESSURE: 78 MMHG | HEIGHT: 71 IN | WEIGHT: 179.44 LBS | BODY MASS INDEX: 25.12 KG/M2 | TEMPERATURE: 99 F | OXYGEN SATURATION: 95 % | SYSTOLIC BLOOD PRESSURE: 140 MMHG

## 2021-04-09 DIAGNOSIS — F51.01 PRIMARY INSOMNIA: ICD-10-CM

## 2021-04-09 DIAGNOSIS — K58.9 IRRITABLE BOWEL SYNDROME, UNSPECIFIED TYPE: ICD-10-CM

## 2021-04-09 DIAGNOSIS — J01.00 ACUTE MAXILLARY SINUSITIS, RECURRENCE NOT SPECIFIED: Primary | ICD-10-CM

## 2021-04-09 DIAGNOSIS — I10 ESSENTIAL HYPERTENSION: ICD-10-CM

## 2021-04-09 DIAGNOSIS — M47.816 SPONDYLOSIS OF LUMBAR REGION WITHOUT MYELOPATHY OR RADICULOPATHY: ICD-10-CM

## 2021-04-09 PROCEDURE — 99214 OFFICE O/P EST MOD 30 MIN: CPT | Mod: S$GLB,,, | Performed by: NURSE PRACTITIONER

## 2021-04-09 PROCEDURE — 99214 PR OFFICE/OUTPT VISIT, EST, LEVL IV, 30-39 MIN: ICD-10-PCS | Mod: S$GLB,,, | Performed by: NURSE PRACTITIONER

## 2021-04-09 RX ORDER — AMOXICILLIN AND CLAVULANATE POTASSIUM 875; 125 MG/1; MG/1
1 TABLET, FILM COATED ORAL 2 TIMES DAILY
Qty: 20 TABLET | Refills: 0 | Status: SHIPPED | OUTPATIENT
Start: 2021-04-09 | End: 2021-04-19

## 2021-04-09 RX ORDER — GABAPENTIN 300 MG/1
300 CAPSULE ORAL NIGHTLY
Qty: 90 CAPSULE | Refills: 2 | Status: SHIPPED | OUTPATIENT
Start: 2021-04-09 | End: 2022-01-19

## 2021-04-09 RX ORDER — DOXEPIN HYDROCHLORIDE 50 MG/1
150 CAPSULE ORAL NIGHTLY
Qty: 90 CAPSULE | Refills: 2 | Status: SHIPPED | OUTPATIENT
Start: 2021-04-09 | End: 2021-06-23 | Stop reason: SDUPTHER

## 2021-04-09 RX ORDER — TEMAZEPAM 15 MG/1
15 CAPSULE ORAL NIGHTLY
Qty: 30 CAPSULE | Refills: 0 | Status: SHIPPED | OUTPATIENT
Start: 2021-04-09 | End: 2021-06-23

## 2021-06-09 ENCOUNTER — PATIENT OUTREACH (OUTPATIENT)
Dept: ADMINISTRATIVE | Facility: HOSPITAL | Age: 76
End: 2021-06-09

## 2021-06-22 DIAGNOSIS — F51.01 PRIMARY INSOMNIA: ICD-10-CM

## 2021-06-23 ENCOUNTER — OFFICE VISIT (OUTPATIENT)
Dept: FAMILY MEDICINE | Facility: CLINIC | Age: 76
End: 2021-06-23
Payer: MEDICARE

## 2021-06-23 VITALS
HEART RATE: 92 BPM | WEIGHT: 180.56 LBS | BODY MASS INDEX: 25.28 KG/M2 | OXYGEN SATURATION: 97 % | HEIGHT: 71 IN | DIASTOLIC BLOOD PRESSURE: 78 MMHG | RESPIRATION RATE: 14 BRPM | TEMPERATURE: 98 F | SYSTOLIC BLOOD PRESSURE: 138 MMHG

## 2021-06-23 DIAGNOSIS — N40.0 BENIGN NON-NODULAR PROSTATIC HYPERPLASIA WITHOUT LOWER URINARY TRACT SYMPTOMS: ICD-10-CM

## 2021-06-23 DIAGNOSIS — E03.9 HYPOTHYROIDISM, UNSPECIFIED TYPE: ICD-10-CM

## 2021-06-23 DIAGNOSIS — Z12.5 SCREENING FOR PROSTATE CANCER: ICD-10-CM

## 2021-06-23 DIAGNOSIS — M47.816 SPONDYLOSIS OF LUMBAR REGION WITHOUT MYELOPATHY OR RADICULOPATHY: ICD-10-CM

## 2021-06-23 DIAGNOSIS — F51.01 PRIMARY INSOMNIA: ICD-10-CM

## 2021-06-23 DIAGNOSIS — M19.041 PRIMARY OSTEOARTHRITIS OF BOTH HANDS: ICD-10-CM

## 2021-06-23 DIAGNOSIS — I10 ESSENTIAL HYPERTENSION: Primary | ICD-10-CM

## 2021-06-23 DIAGNOSIS — M19.042 PRIMARY OSTEOARTHRITIS OF BOTH HANDS: ICD-10-CM

## 2021-06-23 DIAGNOSIS — M47.22 OSTEOARTHRITIS OF SPINE WITH RADICULOPATHY, CERVICAL REGION: ICD-10-CM

## 2021-06-23 DIAGNOSIS — K58.9 IRRITABLE BOWEL SYNDROME, UNSPECIFIED TYPE: ICD-10-CM

## 2021-06-23 DIAGNOSIS — E78.5 HYPERLIPIDEMIA, UNSPECIFIED HYPERLIPIDEMIA TYPE: ICD-10-CM

## 2021-06-23 PROCEDURE — 99214 OFFICE O/P EST MOD 30 MIN: CPT | Mod: S$GLB,,, | Performed by: INTERNAL MEDICINE

## 2021-06-23 PROCEDURE — 99214 PR OFFICE/OUTPT VISIT, EST, LEVL IV, 30-39 MIN: ICD-10-PCS | Mod: S$GLB,,, | Performed by: INTERNAL MEDICINE

## 2021-06-23 RX ORDER — TEMAZEPAM 15 MG/1
15 CAPSULE ORAL NIGHTLY
Qty: 30 CAPSULE | Refills: 5 | Status: SHIPPED | OUTPATIENT
Start: 2021-06-23 | End: 2021-12-28 | Stop reason: SDUPTHER

## 2021-06-23 RX ORDER — DOXEPIN HYDROCHLORIDE 50 MG/1
100 CAPSULE ORAL NIGHTLY
Qty: 180 CAPSULE | Refills: 3 | Status: SHIPPED | OUTPATIENT
Start: 2021-06-23 | End: 2022-06-07 | Stop reason: SDUPTHER

## 2021-06-24 ENCOUNTER — LAB VISIT (OUTPATIENT)
Dept: LAB | Facility: HOSPITAL | Age: 76
End: 2021-06-24
Attending: INTERNAL MEDICINE
Payer: MEDICARE

## 2021-06-24 DIAGNOSIS — Z12.5 SCREENING FOR PROSTATE CANCER: ICD-10-CM

## 2021-06-24 DIAGNOSIS — I10 ESSENTIAL HYPERTENSION: ICD-10-CM

## 2021-06-24 LAB
ALBUMIN SERPL BCP-MCNC: 4.2 G/DL (ref 3.5–5.2)
ALP SERPL-CCNC: 61 U/L (ref 55–135)
ALT SERPL W/O P-5'-P-CCNC: 26 U/L (ref 10–44)
ANION GAP SERPL CALC-SCNC: 9 MMOL/L (ref 8–16)
AST SERPL-CCNC: 20 U/L (ref 10–40)
BASOPHILS # BLD AUTO: 0.03 K/UL (ref 0–0.2)
BASOPHILS NFR BLD: 0.5 % (ref 0–1.9)
BILIRUB SERPL-MCNC: 0.8 MG/DL (ref 0.1–1)
BUN SERPL-MCNC: 19 MG/DL (ref 8–23)
CALCIUM SERPL-MCNC: 9.4 MG/DL (ref 8.7–10.5)
CHLORIDE SERPL-SCNC: 107 MMOL/L (ref 95–110)
CHOLEST SERPL-MCNC: 109 MG/DL (ref 120–199)
CHOLEST/HDLC SERPL: 2.7 {RATIO} (ref 2–5)
CO2 SERPL-SCNC: 25 MMOL/L (ref 23–29)
COMPLEXED PSA SERPL-MCNC: 0.95 NG/ML (ref 0–4)
CREAT SERPL-MCNC: 0.9 MG/DL (ref 0.5–1.4)
DIFFERENTIAL METHOD: NORMAL
EOSINOPHIL # BLD AUTO: 0.4 K/UL (ref 0–0.5)
EOSINOPHIL NFR BLD: 5.7 % (ref 0–8)
ERYTHROCYTE [DISTWIDTH] IN BLOOD BY AUTOMATED COUNT: 13.7 % (ref 11.5–14.5)
EST. GFR  (AFRICAN AMERICAN): >60 ML/MIN/1.73 M^2
EST. GFR  (NON AFRICAN AMERICAN): >60 ML/MIN/1.73 M^2
GLUCOSE SERPL-MCNC: 106 MG/DL (ref 70–110)
HCT VFR BLD AUTO: 43.7 % (ref 40–54)
HDLC SERPL-MCNC: 41 MG/DL (ref 40–75)
HDLC SERPL: 37.6 % (ref 20–50)
HGB BLD-MCNC: 14.5 G/DL (ref 14–18)
IMM GRANULOCYTES # BLD AUTO: 0.02 K/UL (ref 0–0.04)
IMM GRANULOCYTES NFR BLD AUTO: 0.3 % (ref 0–0.5)
LDLC SERPL CALC-MCNC: 56.4 MG/DL (ref 63–159)
LYMPHOCYTES # BLD AUTO: 1.9 K/UL (ref 1–4.8)
LYMPHOCYTES NFR BLD: 30.5 % (ref 18–48)
MCH RBC QN AUTO: 30.6 PG (ref 27–31)
MCHC RBC AUTO-ENTMCNC: 33.2 G/DL (ref 32–36)
MCV RBC AUTO: 92 FL (ref 82–98)
MONOCYTES # BLD AUTO: 0.4 K/UL (ref 0.3–1)
MONOCYTES NFR BLD: 6.7 % (ref 4–15)
NEUTROPHILS # BLD AUTO: 3.4 K/UL (ref 1.8–7.7)
NEUTROPHILS NFR BLD: 56.3 % (ref 38–73)
NONHDLC SERPL-MCNC: 68 MG/DL
NRBC BLD-RTO: 0 /100 WBC
PLATELET # BLD AUTO: 213 K/UL (ref 150–450)
PMV BLD AUTO: 11 FL (ref 9.2–12.9)
POTASSIUM SERPL-SCNC: 4.7 MMOL/L (ref 3.5–5.1)
PROT SERPL-MCNC: 7.2 G/DL (ref 6–8.4)
RBC # BLD AUTO: 4.74 M/UL (ref 4.6–6.2)
SODIUM SERPL-SCNC: 141 MMOL/L (ref 136–145)
TRIGL SERPL-MCNC: 58 MG/DL (ref 30–150)
TSH SERPL DL<=0.005 MIU/L-ACNC: 2.53 UIU/ML (ref 0.4–4)
WBC # BLD AUTO: 6.1 K/UL (ref 3.9–12.7)

## 2021-06-24 PROCEDURE — 36415 COLL VENOUS BLD VENIPUNCTURE: CPT | Mod: PN | Performed by: INTERNAL MEDICINE

## 2021-06-24 PROCEDURE — 85025 COMPLETE CBC W/AUTO DIFF WBC: CPT | Performed by: INTERNAL MEDICINE

## 2021-06-24 PROCEDURE — 84443 ASSAY THYROID STIM HORMONE: CPT | Performed by: INTERNAL MEDICINE

## 2021-06-24 PROCEDURE — 80053 COMPREHEN METABOLIC PANEL: CPT | Performed by: INTERNAL MEDICINE

## 2021-06-24 PROCEDURE — 84153 ASSAY OF PSA TOTAL: CPT | Performed by: INTERNAL MEDICINE

## 2021-06-24 PROCEDURE — 80061 LIPID PANEL: CPT | Performed by: INTERNAL MEDICINE

## 2021-06-25 ENCOUNTER — PATIENT MESSAGE (OUTPATIENT)
Dept: FAMILY MEDICINE | Facility: CLINIC | Age: 76
End: 2021-06-25

## 2021-09-20 ENCOUNTER — PES CALL (OUTPATIENT)
Dept: ADMINISTRATIVE | Facility: CLINIC | Age: 76
End: 2021-09-20

## 2021-09-23 ENCOUNTER — PES CALL (OUTPATIENT)
Dept: ADMINISTRATIVE | Facility: CLINIC | Age: 76
End: 2021-09-23

## 2021-09-28 ENCOUNTER — IMMUNIZATION (OUTPATIENT)
Dept: FAMILY MEDICINE | Facility: CLINIC | Age: 76
End: 2021-09-28
Payer: MEDICARE

## 2021-09-28 DIAGNOSIS — Z23 NEED FOR VACCINATION: Primary | ICD-10-CM

## 2021-09-28 PROCEDURE — 91300 COVID-19, MRNA, LNP-S, PF, 30 MCG/0.3 ML DOSE VACCINE: CPT | Mod: PBBFAC,PO

## 2021-09-28 PROCEDURE — 0003A COVID-19, MRNA, LNP-S, PF, 30 MCG/0.3 ML DOSE VACCINE: CPT | Mod: PBBFAC | Performed by: FAMILY MEDICINE

## 2021-10-06 ENCOUNTER — LAB VISIT (OUTPATIENT)
Dept: FAMILY MEDICINE | Facility: CLINIC | Age: 76
End: 2021-10-06
Payer: MEDICARE

## 2021-10-06 DIAGNOSIS — Z71.84 TRAVEL ADVICE ENCOUNTER: Primary | ICD-10-CM

## 2021-10-06 PROCEDURE — U0005 INFEC AGEN DETEC AMPLI PROBE: HCPCS | Performed by: FAMILY MEDICINE

## 2021-10-06 PROCEDURE — U0003 INFECTIOUS AGENT DETECTION BY NUCLEIC ACID (DNA OR RNA); SEVERE ACUTE RESPIRATORY SYNDROME CORONAVIRUS 2 (SARS-COV-2) (CORONAVIRUS DISEASE [COVID-19]), AMPLIFIED PROBE TECHNIQUE, MAKING USE OF HIGH THROUGHPUT TECHNOLOGIES AS DESCRIBED BY CMS-2020-01-R: HCPCS | Performed by: FAMILY MEDICINE

## 2021-10-07 ENCOUNTER — PATIENT MESSAGE (OUTPATIENT)
Dept: HEMATOLOGY/ONCOLOGY | Facility: CLINIC | Age: 76
End: 2021-10-07

## 2021-10-07 LAB — SARS-COV-2 RNA RESP QL NAA+PROBE: NOT DETECTED

## 2021-11-04 ENCOUNTER — OFFICE VISIT (OUTPATIENT)
Dept: HOME HEALTH SERVICES | Facility: CLINIC | Age: 76
End: 2021-11-04
Payer: MEDICARE

## 2021-11-04 VITALS
HEIGHT: 71 IN | DIASTOLIC BLOOD PRESSURE: 81 MMHG | HEART RATE: 78 BPM | SYSTOLIC BLOOD PRESSURE: 160 MMHG | BODY MASS INDEX: 25.2 KG/M2 | WEIGHT: 180 LBS

## 2021-11-04 DIAGNOSIS — N40.0 BENIGN PROSTATIC HYPERPLASIA, UNSPECIFIED WHETHER LOWER URINARY TRACT SYMPTOMS PRESENT: ICD-10-CM

## 2021-11-04 DIAGNOSIS — Z00.00 ENCOUNTER FOR PREVENTIVE HEALTH EXAMINATION: Primary | ICD-10-CM

## 2021-11-04 DIAGNOSIS — I10 ESSENTIAL HYPERTENSION: ICD-10-CM

## 2021-11-04 DIAGNOSIS — H91.90 HEARING LOSS, UNSPECIFIED HEARING LOSS TYPE, UNSPECIFIED LATERALITY: ICD-10-CM

## 2021-11-04 DIAGNOSIS — E03.9 HYPOTHYROIDISM, UNSPECIFIED TYPE: ICD-10-CM

## 2021-11-04 DIAGNOSIS — E78.5 HYPERLIPIDEMIA, UNSPECIFIED HYPERLIPIDEMIA TYPE: ICD-10-CM

## 2021-11-04 PROCEDURE — G0439 PR MEDICARE ANNUAL WELLNESS SUBSEQUENT VISIT: ICD-10-PCS | Mod: S$GLB,,, | Performed by: NURSE PRACTITIONER

## 2021-11-04 PROCEDURE — G0439 PPPS, SUBSEQ VISIT: HCPCS | Mod: S$GLB,,, | Performed by: NURSE PRACTITIONER

## 2021-12-21 DIAGNOSIS — E78.5 HYPERLIPIDEMIA, UNSPECIFIED HYPERLIPIDEMIA TYPE: ICD-10-CM

## 2021-12-28 ENCOUNTER — OFFICE VISIT (OUTPATIENT)
Dept: FAMILY MEDICINE | Facility: CLINIC | Age: 76
End: 2021-12-28
Payer: MEDICARE

## 2021-12-28 VITALS
DIASTOLIC BLOOD PRESSURE: 78 MMHG | WEIGHT: 180.75 LBS | TEMPERATURE: 98 F | SYSTOLIC BLOOD PRESSURE: 140 MMHG | BODY MASS INDEX: 25.31 KG/M2 | HEART RATE: 81 BPM | RESPIRATION RATE: 20 BRPM | OXYGEN SATURATION: 98 % | HEIGHT: 71 IN

## 2021-12-28 DIAGNOSIS — M19.041 PRIMARY OSTEOARTHRITIS OF BOTH HANDS: ICD-10-CM

## 2021-12-28 DIAGNOSIS — F51.01 PRIMARY INSOMNIA: ICD-10-CM

## 2021-12-28 DIAGNOSIS — K58.9 IRRITABLE BOWEL SYNDROME, UNSPECIFIED TYPE: ICD-10-CM

## 2021-12-28 DIAGNOSIS — E03.9 HYPOTHYROIDISM, UNSPECIFIED TYPE: ICD-10-CM

## 2021-12-28 DIAGNOSIS — N40.0 BENIGN NON-NODULAR PROSTATIC HYPERPLASIA WITHOUT LOWER URINARY TRACT SYMPTOMS: ICD-10-CM

## 2021-12-28 DIAGNOSIS — M19.042 PRIMARY OSTEOARTHRITIS OF BOTH HANDS: ICD-10-CM

## 2021-12-28 DIAGNOSIS — Z23 NEED FOR IMMUNIZATION AGAINST INFLUENZA: ICD-10-CM

## 2021-12-28 DIAGNOSIS — I10 ESSENTIAL HYPERTENSION: Primary | ICD-10-CM

## 2021-12-28 DIAGNOSIS — E78.5 HYPERLIPIDEMIA, UNSPECIFIED HYPERLIPIDEMIA TYPE: ICD-10-CM

## 2021-12-28 DIAGNOSIS — M47.22 OSTEOARTHRITIS OF SPINE WITH RADICULOPATHY, CERVICAL REGION: ICD-10-CM

## 2021-12-28 PROCEDURE — 99214 OFFICE O/P EST MOD 30 MIN: CPT | Mod: S$GLB,,, | Performed by: INTERNAL MEDICINE

## 2021-12-28 PROCEDURE — 90694 VACC AIIV4 NO PRSRV 0.5ML IM: CPT | Mod: S$GLB,,, | Performed by: INTERNAL MEDICINE

## 2021-12-28 PROCEDURE — 93010 ELECTROCARDIOGRAM REPORT: CPT | Mod: S$PBB,,, | Performed by: INTERNAL MEDICINE

## 2021-12-28 PROCEDURE — G0008 ADMIN INFLUENZA VIRUS VAC: HCPCS | Mod: S$GLB,,, | Performed by: INTERNAL MEDICINE

## 2021-12-28 PROCEDURE — G0008 FLU VACCINE - QUADRIVALENT - ADJUVANTED: ICD-10-PCS | Mod: S$GLB,,, | Performed by: INTERNAL MEDICINE

## 2021-12-28 PROCEDURE — 90694 FLU VACCINE - QUADRIVALENT - ADJUVANTED: ICD-10-PCS | Mod: S$GLB,,, | Performed by: INTERNAL MEDICINE

## 2021-12-28 PROCEDURE — 93005 EKG 12-LEAD: ICD-10-PCS | Mod: S$GLB,,, | Performed by: INTERNAL MEDICINE

## 2021-12-28 PROCEDURE — 93005 ELECTROCARDIOGRAM TRACING: CPT | Mod: S$GLB,,, | Performed by: INTERNAL MEDICINE

## 2021-12-28 PROCEDURE — 99214 PR OFFICE/OUTPT VISIT, EST, LEVL IV, 30-39 MIN: ICD-10-PCS | Mod: S$GLB,,, | Performed by: INTERNAL MEDICINE

## 2021-12-28 PROCEDURE — 93010 EKG 12-LEAD: ICD-10-PCS | Mod: S$PBB,,, | Performed by: INTERNAL MEDICINE

## 2021-12-28 RX ORDER — TEMAZEPAM 15 MG/1
15 CAPSULE ORAL NIGHTLY
Qty: 90 CAPSULE | Refills: 1 | Status: SHIPPED | OUTPATIENT
Start: 2021-12-28 | End: 2021-12-28 | Stop reason: SDUPTHER

## 2021-12-28 RX ORDER — TEMAZEPAM 15 MG/1
15 CAPSULE ORAL NIGHTLY
Qty: 90 CAPSULE | Refills: 1 | Status: SHIPPED | OUTPATIENT
Start: 2021-12-28 | End: 2022-06-27 | Stop reason: SDUPTHER

## 2021-12-29 RX ORDER — ROSUVASTATIN CALCIUM 5 MG/1
TABLET, COATED ORAL
Qty: 90 TABLET | Refills: 1 | Status: SHIPPED | OUTPATIENT
Start: 2021-12-29 | End: 2022-03-21

## 2022-01-05 ENCOUNTER — CLINICAL SUPPORT (OUTPATIENT)
Dept: FAMILY MEDICINE | Facility: CLINIC | Age: 77
End: 2022-01-05
Payer: MEDICARE

## 2022-01-05 ENCOUNTER — TELEPHONE (OUTPATIENT)
Dept: FAMILY MEDICINE | Facility: CLINIC | Age: 77
End: 2022-01-05

## 2022-01-05 VITALS — SYSTOLIC BLOOD PRESSURE: 135 MMHG | DIASTOLIC BLOOD PRESSURE: 67 MMHG | HEART RATE: 75 BPM

## 2022-01-05 DIAGNOSIS — Z01.30 BP CHECK: Primary | ICD-10-CM

## 2022-01-05 NOTE — PROGRESS NOTES
BP: 135/67 , Pulse: 75        Eric Buitrago 76 y.o. male is here today for Blood Pressure check.   History of HTN yes.    Review of patient's allergies indicates:   Allergen Reactions    No known drug allergies      Creatinine   Date Value Ref Range Status   06/24/2021 0.9 0.5 - 1.4 mg/dL Final     Sodium   Date Value Ref Range Status   06/24/2021 141 136 - 145 mmol/L Final     Potassium   Date Value Ref Range Status   06/24/2021 4.7 3.5 - 5.1 mmol/L Final   ]  Patient verifies taking blood pressure medications on a regular basis at the same time of the day.     Current Outpatient Medications:     amLODIPine (NORVASC) 5 MG tablet, TAKE 1 TABLET(5 MG) BY MOUTH EVERY DAY, Disp: 90 tablet, Rfl: 3    azelastine (ASTELIN) 137 mcg (0.1 %) nasal spray, 1 spray (137 mcg total) by Nasal route 2 (two) times daily., Disp: 30 mL, Rfl: 11    doxepin (SINEQUAN) 50 MG capsule, Take 2 capsules (100 mg total) by mouth every evening., Disp: 180 capsule, Rfl: 3    fexofenadine (ALLEGRA) 60 MG tablet, Take 1 tablet by mouth. , Disp: , Rfl:     gabapentin (NEURONTIN) 300 MG capsule, Take 1 capsule (300 mg total) by mouth every evening., Disp: 90 capsule, Rfl: 2    levothyroxine (SYNTHROID) 137 MCG Tab tablet, TAKE 1 TABLET(137 MCG) BY MOUTH BEFORE BREAKFAST, Disp: 90 tablet, Rfl: 3    losartan (COZAAR) 50 MG tablet, TAKE 1 TABLET(50 MG) BY MOUTH EVERY DAY, Disp: 90 tablet, Rfl: 3    meloxicam (MOBIC) 15 MG tablet, TAKE 1 TABLET(15 MG) BY MOUTH EVERY DAY, Disp: 90 tablet, Rfl: 3    rosuvastatin (CRESTOR) 5 MG tablet, TAKE 1 TABLET(5 MG) BY MOUTH EVERY DAY, Disp: 90 tablet, Rfl: 1    tamsulosin (FLOMAX) 0.4 mg Cap, TAKE 1 CAPSULE BY MOUTH EVERY EVENING, Disp: 90 capsule, Rfl: 3    temazepam (RESTORIL) 15 mg Cap, Take 1 capsule (15 mg total) by mouth every night at bedtime., Disp: 90 capsule, Rfl: 1  Does patient have record of home blood pressure readings yes. Readings were sent to provider for review.   Last dose of blood  pressure medication was taken this am.  Patient is asymptomatic.   Complains of No complaints.

## 2022-01-05 NOTE — TELEPHONE ENCOUNTER
We called pt today for a BP check. Reading was 135/67 P75. Pt has a list of reading's 127/60 P78, 140/65 P71, 135/82 P83, 135/87 P89, 130/71 P79, 121/65 P69, 127/64 P70, 133/60 P70, 147/69 P63, 132/69 P66, 126/61 P72, 130/72 P66.

## 2022-01-14 DIAGNOSIS — J06.9 UPPER RESPIRATORY TRACT INFECTION, UNSPECIFIED TYPE: ICD-10-CM

## 2022-01-14 DIAGNOSIS — J01.80 OTHER ACUTE SINUSITIS, RECURRENCE NOT SPECIFIED: ICD-10-CM

## 2022-01-18 DIAGNOSIS — J01.80 OTHER ACUTE SINUSITIS, RECURRENCE NOT SPECIFIED: ICD-10-CM

## 2022-01-18 DIAGNOSIS — J06.9 UPPER RESPIRATORY TRACT INFECTION, UNSPECIFIED TYPE: ICD-10-CM

## 2022-01-18 NOTE — TELEPHONE ENCOUNTER
No new care gaps identified.  Powered by [employer]. Reference number: [id number].   1/18/2022 12:07:14 PM CST

## 2022-01-24 ENCOUNTER — TELEPHONE (OUTPATIENT)
Dept: FAMILY MEDICINE | Facility: CLINIC | Age: 77
End: 2022-01-24
Payer: MEDICARE

## 2022-01-24 NOTE — TELEPHONE ENCOUNTER
----- Message from Barbara Phan sent at 1/24/2022  2:01 PM CST -----  Regarding: refill  Contact: Patient/[phone] (home)  Type:  RX Refill Request    Who Called:  Patient/[phone] (home)     Refill or New Rx:  refill  RX Name and Strength:  azelastine (ASTELIN) 137 mcg (0.1 %) nasal spray      How is the patient currently taking it? (ex. 1XDay):  as needed    Preferred Pharmacy with phone number:    [id number] [address] [place] [address] AT [facility]  [address] [place] [id number]  Phone: [phone] Fax: [phone]      Local or Mail Order:  local  Ordering Provider:  same    Additional Information:  Pharmacy said they have been sending in refills. Please call patient when completed.

## 2022-01-25 NOTE — TELEPHONE ENCOUNTER
Quick DC. Duplicate Request-  med pended in previous encounter, awaiting assessment    Refill Authorization Note   Eric Buitrago  is requesting a refill authorization.  Brief Assessment and Rationale for Refill:  Quick Discontinue  Medication Therapy Plan:       Medication Reconciliation Completed:  No      Comments:   Pended Medication(s)       Requested Prescriptions     Pending Prescriptions Disp Refills    azelastine (ASTELIN) 137 mcg (0.1 %) nasal spray [Pharmacy Med Name: AZELASTINE 0.1%(137MCG) NASAL-200SP] 30 mL 11     Sig: INSTILL 1 SPRAY IN EACH NOSTRIL TWICE DAILY        Duplicate Pended Encounter(s)/ Last Prescribed Details: (includes pharmacy & prescriber details)   Ordering Encounter Report    Associated Reports   View Encounter                Note composed:12:33 PM 01/25/2022

## 2022-01-25 NOTE — TELEPHONE ENCOUNTER
No new care gaps identified.  Powered by Real Time [employer] by [employer]. Reference number: [id number].   1/25/2022 12:19:40 PM CST

## 2022-01-25 NOTE — TELEPHONE ENCOUNTER
Refill Authorization Note   Eric Buitrago  is requesting a refill authorization.  Brief Assessment and Rationale for Refill:  Approve     Medication Therapy Plan:       Medication Reconciliation Completed: No   Comments:   --->Care Gap information included below if applicable.       Requested Prescriptions   Pending Prescriptions Disp Refills    azelastine (ASTELIN) 137 mcg (0.1 %) nasal spray [Pharmacy Med Name: AZELASTINE 0.1%(137MCG) NASAL-200SP] 90 mL 3     Si spray (137 mcg total) by Nasal route 2 (two) times daily.       Ear, Nose, and Throat: Nasal Preparations - Antiallergy Passed - 2022 12:19 PM        Passed - Patient is at least 18 years old        Passed - Valid encounter within last 15 months     Recent Visits  Date Type Provider Dept   21 Office Visit Maddi Gastelum DO [facility]   21 Office Visit Maddi Gastelum DO [facility]   20 Office Visit Maddi Gastelum DO [facility]   Showing recent visits within past 720 days and meeting all other requirements  Future Appointments  No visits were found meeting these conditions.  Showing future appointments within next 150 days and meeting all other requirements      Future Appointments              In 4 months [facility], [facility] [place]    In 5 months Maddi Gastelum DO [place] - Family Medicine, [facility]                    Appointments  past 12m or future 3m with PCP    Date Provider   Last Visit   2021 Maddi Gastelum DO   Next Visit   2022 Maddi Gastelum DO   ED visits in past 90 days: 0     Note composed:12:27 PM 2022

## 2022-01-26 RX ORDER — AZELASTINE 1 MG/ML
1 SPRAY, METERED NASAL 2 TIMES DAILY
Qty: 90 ML | Refills: 3 | Status: SHIPPED | OUTPATIENT
Start: 2022-01-26 | End: 2023-03-14

## 2022-01-26 RX ORDER — AZELASTINE 1 MG/ML
SPRAY, METERED NASAL
Qty: 30 ML | Refills: 11 | OUTPATIENT
Start: 2022-01-26

## 2022-02-08 ENCOUNTER — PATIENT OUTREACH (OUTPATIENT)
Dept: ADMINISTRATIVE | Facility: HOSPITAL | Age: 77
End: 2022-02-08
Payer: MEDICARE

## 2022-03-29 DIAGNOSIS — N40.0 BENIGN NON-NODULAR PROSTATIC HYPERPLASIA WITHOUT LOWER URINARY TRACT SYMPTOMS: ICD-10-CM

## 2022-03-29 NOTE — TELEPHONE ENCOUNTER
Care Due:                  Date            Visit Type   Department     Provider  --------------------------------------------------------------------------------                                EP -                              PRIMARY      [facility]  Last Visit: 12-      CARE ([facility])   MEDICINE       Maddi Gastelum                              EP -                              PRIMARY      [facility]  Next Visit: 06-      CARE ([facility])   [facility]       Maddi Gastelum                                                            Last  Test          Frequency    Reason                     Performed    Due Date  --------------------------------------------------------------------------------    CMP.........  12 months..  losartan, rosuvastatin...  06- 06-    Lipid Panel.  12 months..  rosuvastatin.............  06- 06-    Powered by [employer]. Reference number: [id number].   3/29/2022 8:05:27 AM CDT

## 2022-03-30 RX ORDER — TAMSULOSIN HYDROCHLORIDE 0.4 MG/1
CAPSULE ORAL
Qty: 90 CAPSULE | Refills: 2 | Status: SHIPPED | OUTPATIENT
Start: 2022-03-30 | End: 2023-01-30

## 2022-03-30 NOTE — TELEPHONE ENCOUNTER
Provider Staff:     Action is required for this patient.   Please see care gap opportunities below in Care Due Message.     Thanks!  [facility]     Appointments      Date Provider   Last Visit   12/28/2021 Maddi Gastelum DO   Next Visit   6/28/2022 Maddi Gastelum DO     Note composed:2:20 PM 03/30/2022

## 2022-06-07 DIAGNOSIS — K58.9 IRRITABLE BOWEL SYNDROME, UNSPECIFIED TYPE: ICD-10-CM

## 2022-06-07 RX ORDER — DOXEPIN HYDROCHLORIDE 50 MG/1
100 CAPSULE ORAL NIGHTLY
Qty: 180 CAPSULE | Refills: 3 | Status: SHIPPED | OUTPATIENT
Start: 2022-06-07 | End: 2023-04-10

## 2022-06-07 NOTE — TELEPHONE ENCOUNTER
Care Due:                  Date            Visit Type   Department     Provider  --------------------------------------------------------------------------------                                EP -                              PRIMARY      [facility]  Last Visit: 12-      CARE ([facility])   MEDICINE       Maddi Gastelum                              EP -                              PRIMARY      [facility]  Next Visit: 06-      CARE ([facility])   [facility]       Maddi Gastelum                                                            Last  Test          Frequency    Reason                     Performed    Due Date  --------------------------------------------------------------------------------    CMP.........  12 months..  losartan, rosuvastatin...  06- 06-    Lipid Panel.  12 months..  rosuvastatin.............  06- 06-    [facility] Embedded Care Gaps. Reference number: [id number]. 6/07/2022   10:11:27 AM CDT

## 2022-06-20 NOTE — TELEPHONE ENCOUNTER
Refill Authorization Note   Eric Buitrago  is requesting a refill authorization.  Brief Assessment and Rationale for Refill:  Approve     Medication Therapy Plan:       Medication Reconciliation Completed: No   Comments:   --->Care Gap information included below if applicable.   Orders Placed This Encounter    tamsulosin (FLOMAX) 0.4 mg Cap      Requested Prescriptions   Signed Prescriptions Disp Refills    tamsulosin (FLOMAX) 0.4 mg Cap 90 capsule 2     Sig: TAKE 1 CAPSULE BY MOUTH EVERY EVENING       Urology: Alpha-Adrenergic Blocker Passed - 3/29/2022  8:05 AM        Passed - Patient is at least 18 years old        Passed - Prostate Cancer is not on problem list        Passed - BP > 90/50 mmH     BP Readings from Last 3 Encounters:   01/05/22 135/67   12/28/21 (!) 140/78   11/04/21 (!) 160/81               Passed - Valid encounter within last 15 months     Recent Visits  Date Type Provider Dept   12/28/21 Office Visit Maddi Gastelum DO [facility]   06/23/21 Office Visit Maddi Gastelum DO [facility]   06/16/20 Office Visit Maddi Gastelum DO [facility]   Showing recent visits within past 720 days and meeting all other requirements  Future Appointments  No visits were found meeting these conditions.  Showing future appointments within next 150 days and meeting all other requirements      Future Appointments              In 2 months LAB, [facility] [place], [place]    In 3 months Maddi Gastelum DO [place] - [facility], [facility]                Passed - Matches previous order       Previous Authorizing Provider: Maddi Gastelum DO (tamsulosin (FLOMAX) 0.4 mg Cap)  Previous Pharmacy: [id number] [address] [facility]            Passed - No ED/Hospital visits since last PCP visit     Last PCP Visit: 12/28/2021 Last Admission: 2/21/2018 Last ED Visit:               Appointments  past 12m or future 3m with PCP    Date  Provider   Last Visit   12/28/2021 Maddi Gastelum, DO   Next Visit   6/28/2022 Maddi Gastelum DO   ED visits in past 90 days: 0     Note composed:2:20 PM 03/30/2022          no fever and no chills.

## 2022-06-21 ENCOUNTER — LAB VISIT (OUTPATIENT)
Dept: LAB | Facility: HOSPITAL | Age: 77
End: 2022-06-21
Attending: INTERNAL MEDICINE
Payer: MEDICARE

## 2022-06-21 DIAGNOSIS — I10 ESSENTIAL HYPERTENSION: ICD-10-CM

## 2022-06-21 LAB
ALBUMIN SERPL BCP-MCNC: 3.9 G/DL (ref 3.5–5.2)
ALP SERPL-CCNC: 52 U/L (ref 55–135)
ALT SERPL W/O P-5'-P-CCNC: 20 U/L (ref 10–44)
ANION GAP SERPL CALC-SCNC: 10 MMOL/L (ref 8–16)
AST SERPL-CCNC: 16 U/L (ref 10–40)
BASOPHILS # BLD AUTO: 0.03 K/UL (ref 0–0.2)
BASOPHILS NFR BLD: 0.5 % (ref 0–1.9)
BILIRUB SERPL-MCNC: 0.8 MG/DL (ref 0.1–1)
BUN SERPL-MCNC: 22 MG/DL (ref 8–23)
CALCIUM SERPL-MCNC: 8.9 MG/DL (ref 8.7–10.5)
CHLORIDE SERPL-SCNC: 110 MMOL/L (ref 95–110)
CHOLEST SERPL-MCNC: 106 MG/DL (ref 120–199)
CHOLEST/HDLC SERPL: 2.8 {RATIO} (ref 2–5)
CO2 SERPL-SCNC: 21 MMOL/L (ref 23–29)
CREAT SERPL-MCNC: 0.9 MG/DL (ref 0.5–1.4)
DIFFERENTIAL METHOD: ABNORMAL
EOSINOPHIL # BLD AUTO: 0.4 K/UL (ref 0–0.5)
EOSINOPHIL NFR BLD: 6.2 % (ref 0–8)
ERYTHROCYTE [DISTWIDTH] IN BLOOD BY AUTOMATED COUNT: 13.2 % (ref 11.5–14.5)
EST. GFR  (AFRICAN AMERICAN): >60 ML/MIN/1.73 M^2
EST. GFR  (NON AFRICAN AMERICAN): >60 ML/MIN/1.73 M^2
GLUCOSE SERPL-MCNC: 106 MG/DL (ref 70–110)
HCT VFR BLD AUTO: 41.6 % (ref 40–54)
HDLC SERPL-MCNC: 38 MG/DL (ref 40–75)
HDLC SERPL: 35.8 % (ref 20–50)
HGB BLD-MCNC: 14 G/DL (ref 14–18)
IMM GRANULOCYTES # BLD AUTO: 0.01 K/UL (ref 0–0.04)
IMM GRANULOCYTES NFR BLD AUTO: 0.2 % (ref 0–0.5)
LDLC SERPL CALC-MCNC: 52.8 MG/DL (ref 63–159)
LYMPHOCYTES # BLD AUTO: 1.7 K/UL (ref 1–4.8)
LYMPHOCYTES NFR BLD: 27.9 % (ref 18–48)
MCH RBC QN AUTO: 30.9 PG (ref 27–31)
MCHC RBC AUTO-ENTMCNC: 33.7 G/DL (ref 32–36)
MCV RBC AUTO: 92 FL (ref 82–98)
MONOCYTES # BLD AUTO: 0.4 K/UL (ref 0.3–1)
MONOCYTES NFR BLD: 7 % (ref 4–15)
NEUTROPHILS # BLD AUTO: 3.6 K/UL (ref 1.8–7.7)
NEUTROPHILS NFR BLD: 58.2 % (ref 38–73)
NONHDLC SERPL-MCNC: 68 MG/DL
NRBC BLD-RTO: 0 /100 WBC
PLATELET # BLD AUTO: 219 K/UL (ref 150–450)
PMV BLD AUTO: 10.8 FL (ref 9.2–12.9)
POTASSIUM SERPL-SCNC: 4.3 MMOL/L (ref 3.5–5.1)
PROT SERPL-MCNC: 6.6 G/DL (ref 6–8.4)
RBC # BLD AUTO: 4.53 M/UL (ref 4.6–6.2)
SODIUM SERPL-SCNC: 141 MMOL/L (ref 136–145)
TRIGL SERPL-MCNC: 76 MG/DL (ref 30–150)
TSH SERPL DL<=0.005 MIU/L-ACNC: 2.17 UIU/ML (ref 0.4–4)
WBC # BLD AUTO: 6.17 K/UL (ref 3.9–12.7)

## 2022-06-21 PROCEDURE — 85025 COMPLETE CBC W/AUTO DIFF WBC: CPT | Performed by: INTERNAL MEDICINE

## 2022-06-21 PROCEDURE — 80053 COMPREHEN METABOLIC PANEL: CPT | Performed by: INTERNAL MEDICINE

## 2022-06-21 PROCEDURE — 36415 COLL VENOUS BLD VENIPUNCTURE: CPT | Mod: PN | Performed by: INTERNAL MEDICINE

## 2022-06-21 PROCEDURE — 84443 ASSAY THYROID STIM HORMONE: CPT | Performed by: INTERNAL MEDICINE

## 2022-06-21 PROCEDURE — 80061 LIPID PANEL: CPT | Performed by: INTERNAL MEDICINE

## 2022-06-22 ENCOUNTER — PATIENT MESSAGE (OUTPATIENT)
Dept: FAMILY MEDICINE | Facility: CLINIC | Age: 77
End: 2022-06-22
Payer: MEDICARE

## 2022-06-27 DIAGNOSIS — F51.01 PRIMARY INSOMNIA: ICD-10-CM

## 2022-06-27 RX ORDER — TEMAZEPAM 15 MG/1
15 CAPSULE ORAL NIGHTLY
Qty: 90 CAPSULE | Refills: 1 | Status: CANCELLED | OUTPATIENT
Start: 2022-06-27

## 2022-06-27 NOTE — TELEPHONE ENCOUNTER
No new care gaps identified.  [facility] Embedded Care Gaps. Reference number: [id number]. 6/27/2022   3:12:18 PM CDT

## 2022-06-27 NOTE — TELEPHONE ENCOUNTER
No new care gaps identified.  [facility] Embedded Care Gaps. Reference number: [id number]. 6/27/2022   9:36:14 AM ANDRET

## 2022-06-28 ENCOUNTER — OFFICE VISIT (OUTPATIENT)
Dept: FAMILY MEDICINE | Facility: CLINIC | Age: 77
End: 2022-06-28
Payer: MEDICARE

## 2022-06-28 VITALS
SYSTOLIC BLOOD PRESSURE: 138 MMHG | HEIGHT: 71 IN | OXYGEN SATURATION: 97 % | HEART RATE: 72 BPM | RESPIRATION RATE: 20 BRPM | WEIGHT: 180.5 LBS | BODY MASS INDEX: 25.27 KG/M2 | DIASTOLIC BLOOD PRESSURE: 78 MMHG | TEMPERATURE: 98 F

## 2022-06-28 DIAGNOSIS — M19.042 PRIMARY OSTEOARTHRITIS OF BOTH HANDS: ICD-10-CM

## 2022-06-28 DIAGNOSIS — E78.5 HYPERLIPIDEMIA, UNSPECIFIED HYPERLIPIDEMIA TYPE: ICD-10-CM

## 2022-06-28 DIAGNOSIS — F51.01 PRIMARY INSOMNIA: ICD-10-CM

## 2022-06-28 DIAGNOSIS — B35.1 ONYCHOMYCOSIS: ICD-10-CM

## 2022-06-28 DIAGNOSIS — E03.9 HYPOTHYROIDISM, UNSPECIFIED TYPE: ICD-10-CM

## 2022-06-28 DIAGNOSIS — N40.0 BENIGN NON-NODULAR PROSTATIC HYPERPLASIA WITHOUT LOWER URINARY TRACT SYMPTOMS: ICD-10-CM

## 2022-06-28 DIAGNOSIS — M19.041 PRIMARY OSTEOARTHRITIS OF BOTH HANDS: ICD-10-CM

## 2022-06-28 DIAGNOSIS — M47.22 OSTEOARTHRITIS OF SPINE WITH RADICULOPATHY, CERVICAL REGION: ICD-10-CM

## 2022-06-28 DIAGNOSIS — I10 ESSENTIAL HYPERTENSION: Primary | ICD-10-CM

## 2022-06-28 DIAGNOSIS — K58.9 IRRITABLE BOWEL SYNDROME, UNSPECIFIED TYPE: ICD-10-CM

## 2022-06-28 PROCEDURE — 3075F SYST BP GE 130 - 139MM HG: CPT | Mod: CPTII,S$GLB,, | Performed by: INTERNAL MEDICINE

## 2022-06-28 PROCEDURE — 3288F FALL RISK ASSESSMENT DOCD: CPT | Mod: CPTII,S$GLB,, | Performed by: INTERNAL MEDICINE

## 2022-06-28 PROCEDURE — 1160F RVW MEDS BY RX/DR IN RCRD: CPT | Mod: CPTII,S$GLB,, | Performed by: INTERNAL MEDICINE

## 2022-06-28 PROCEDURE — 1126F AMNT PAIN NOTED NONE PRSNT: CPT | Mod: CPTII,S$GLB,, | Performed by: INTERNAL MEDICINE

## 2022-06-28 PROCEDURE — 99214 PR OFFICE/OUTPT VISIT, EST, LEVL IV, 30-39 MIN: ICD-10-PCS | Mod: S$GLB,,, | Performed by: INTERNAL MEDICINE

## 2022-06-28 PROCEDURE — 1159F MED LIST DOCD IN RCRD: CPT | Mod: CPTII,S$GLB,, | Performed by: INTERNAL MEDICINE

## 2022-06-28 PROCEDURE — 3078F PR MOST RECENT DIASTOLIC BLOOD PRESSURE < 80 MM HG: ICD-10-PCS | Mod: CPTII,S$GLB,, | Performed by: INTERNAL MEDICINE

## 2022-06-28 PROCEDURE — 3078F DIAST BP <80 MM HG: CPT | Mod: CPTII,S$GLB,, | Performed by: INTERNAL MEDICINE

## 2022-06-28 PROCEDURE — 1126F PR PAIN SEVERITY QUANTIFIED, NO PAIN PRESENT: ICD-10-PCS | Mod: CPTII,S$GLB,, | Performed by: INTERNAL MEDICINE

## 2022-06-28 PROCEDURE — 3288F PR FALLS RISK ASSESSMENT DOCUMENTED: ICD-10-PCS | Mod: CPTII,S$GLB,, | Performed by: INTERNAL MEDICINE

## 2022-06-28 PROCEDURE — 1160F PR REVIEW ALL MEDS BY PRESCRIBER/CLIN PHARMACIST DOCUMENTED: ICD-10-PCS | Mod: CPTII,S$GLB,, | Performed by: INTERNAL MEDICINE

## 2022-06-28 PROCEDURE — 1101F PT FALLS ASSESS-DOCD LE1/YR: CPT | Mod: CPTII,S$GLB,, | Performed by: INTERNAL MEDICINE

## 2022-06-28 PROCEDURE — 3075F PR MOST RECENT SYSTOLIC BLOOD PRESS GE 130-139MM HG: ICD-10-PCS | Mod: CPTII,S$GLB,, | Performed by: INTERNAL MEDICINE

## 2022-06-28 PROCEDURE — 99214 OFFICE O/P EST MOD 30 MIN: CPT | Mod: S$GLB,,, | Performed by: INTERNAL MEDICINE

## 2022-06-28 PROCEDURE — 1159F PR MEDICATION LIST DOCUMENTED IN MEDICAL RECORD: ICD-10-PCS | Mod: CPTII,S$GLB,, | Performed by: INTERNAL MEDICINE

## 2022-06-28 PROCEDURE — 1101F PR PT FALLS ASSESS DOC 0-1 FALLS W/OUT INJ PAST YR: ICD-10-PCS | Mod: CPTII,S$GLB,, | Performed by: INTERNAL MEDICINE

## 2022-06-28 RX ORDER — TEMAZEPAM 15 MG/1
15 CAPSULE ORAL NIGHTLY
Qty: 90 CAPSULE | Refills: 1 | Status: SHIPPED | OUTPATIENT
Start: 2022-06-28 | End: 2022-12-12 | Stop reason: SDUPTHER

## 2022-06-28 RX ORDER — TERBINAFINE HYDROCHLORIDE 250 MG/1
250 TABLET ORAL DAILY
Qty: 90 TABLET | Refills: 0 | Status: SHIPPED | OUTPATIENT
Start: 2022-06-28 | End: 2022-07-28

## 2022-06-28 NOTE — PROGRESS NOTES
Subjective:       Patient ID: Eric Buitrago is a 76 y.o. male.    Medication List with Changes/Refills   New Medications    TERBINAFINE HCL (LAMISIL) 250 MG TABLET    Take 1 tablet (250 mg total) by mouth once daily.   Current Medications    AMLODIPINE (NORVASC) 5 MG TABLET    TAKE 1 TABLET(5 MG) BY MOUTH EVERY DAY    AZELASTINE (ASTELIN) 137 MCG (0.1 %) NASAL SPRAY    1 spray (137 mcg total) by Nasal route 2 (two) times daily.    DOXEPIN (SINEQUAN) 50 MG CAPSULE    Take 2 capsules (100 mg total) by mouth every evening.    FEXOFENADINE (ALLEGRA) 60 MG TABLET    Take 1 tablet by mouth.     GABAPENTIN (NEURONTIN) 300 MG CAPSULE    TAKE 1 CAPSULE(300 MG) BY MOUTH EVERY EVENING    LEVOTHYROXINE (SYNTHROID) 137 MCG TAB TABLET    TAKE 1 TABLET(137 MCG) BY MOUTH BEFORE BREAKFAST    LOSARTAN (COZAAR) 50 MG TABLET    TAKE 1 TABLET(50 MG) BY MOUTH EVERY DAY    MELOXICAM (MOBIC) 15 MG TABLET    TAKE 1 TABLET(15 MG) BY MOUTH EVERY DAY    ROSUVASTATIN (CRESTOR) 5 MG TABLET    TAKE 1 TABLET(5 MG) BY MOUTH EVERY DAY    TAMSULOSIN (FLOMAX) 0.4 MG CAP    TAKE 1 CAPSULE BY MOUTH EVERY EVENING    TEMAZEPAM (RESTORIL) 15 MG CAP    Take 1 capsule (15 mg total) by mouth every night at bedtime.       Chief Complaint: Follow-up  He is here today to f/u on chronic medical issues.      He complains of thickening and yellowing of the first nail on his right foot and would like treatment.      He has hypertension and is taking losartan 50 mg qday and amlodipine 5 mg daily.  He denies any CAD. He denies chest pain or shortness of breath. He does not check his BP at home.     He has hyperlipidemia and is taking crestor 5 mg qday. His last lipids on 6/2022 were 106/76/38/52.      He has hypothyroid that is controlled on levothyroxine 137 mcg qday. His last TSH was normal on 6/2022.      He has BPH which is controlled with flomax. He says this helps with his frequency and nocturia. PSA on 6/2021 was 0.95.       He has seasonal allergies  controlled with allegra as needed and astelin which he takes daily.  He denies any active symptoms.         He has IBS for many years. He was seen by GI about 10 years ago and started on doxepin for spastic colon.  He says this has worked and he no longer has any symptoms.  His most recent colonoscopy on 2/2018 had no polyps but did show mild colonic spasms. He decreased his dose of doxepin to 100 mg daily and feels his symptoms are controlled on this dose.      He has insomnia that is controlled with PRN temazepam 15 mg 1/2 pill qhs 2-3 times a week. He says it works and denies side effects.  Last fill was #90 on 6/28/2022.         He occasionally has low back pain if he over exerts himself. He will take mobic with good relief.  He is pain free today. Pain worse with bending or lifting.  Better with stretching and rest.       He has shoulder pain with intermittent radiation down left arm. He was seen by pain management and had an MRI on 1/2019 showing Multilevel spondylosis, greatest at C5-6 where there is mild cord flattening with effacement of ventral and dorsal CSF.  No cord signal abnormality.Multilevel foraminal stenosis, greatest at C5-6 where it is moderate-severe bilaterally. Moderate multilevel hypertrophic facet arthropathy.  He was seen by pain and started on gabaepntin 300 mg qhs which has helped with his pain.  If worsens then pain would like to do a cervical steroid injection.  He completed PT without much difference in his pain.  He reports gabapentin completely controls his pain.       He has osteoarthritis of both hands left > right. 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 He says years ago he had a steroid injection in his thumb that helped a similar problem. Rheumatologic workup was negative on 6/2021.        He lives with his wife and feels safe at home. He is very active and enjoys working in his yard. He goes to the gym 3  "times a week for one hour and does treadmill and weight training. He does eat healthy. No falls or balance issues.      Colonoscopy---2/2018 repeat in 10 years  Tdap---9/2017  Influenza vaccine---12/2021  Pneumovax 23----6/2018  Prevnar 13----6/2017  Shingles vaccine----- 10/2011, 11/2018, 1/2019   Covid vaccine---UTD x 3 doses       Review of Systems   Constitutional: Negative for appetite change, fatigue, fever and unexpected weight change.   HENT: Negative for congestion, ear pain, hearing loss, sore throat and trouble swallowing.    Eyes: Negative for pain and visual disturbance.   Respiratory: Negative for cough, chest tightness, shortness of breath and wheezing.    Cardiovascular: Negative for chest pain, palpitations and leg swelling.   Gastrointestinal: Negative for abdominal pain, blood in stool, constipation, diarrhea, nausea and vomiting.   Endocrine: Negative for polyuria.   Genitourinary: Negative for dysuria and hematuria.   Musculoskeletal: Positive for arthralgias. Negative for back pain and myalgias.   Skin: Negative for rash.   Neurological: Negative for dizziness, weakness, numbness and headaches.   Hematological: Does not bruise/bleed easily.   Psychiatric/Behavioral: Positive for sleep disturbance. Negative for dysphoric mood and suicidal ideas. The patient is not nervous/anxious.        Objective:      Vitals:    06/28/22 0939   BP: 138/78   Pulse: 72   Resp: 20   Temp: 98.4 °F (36.9 °C)   SpO2: 97%   Weight: 81.9 kg (180 lb 8.2 oz)   Height: 5' 11" (1.803 m)     Body mass index is 25.18 kg/m².  Physical Exam    General appearance: No acute distress, cooperative  Eyes: PERRL, EOMI, conjunctiva clear  Ears: normal external ear and pinna, tm clear without drainage, canals clear  Nose: Normal mucosa without drainage  Throat: no exudates or erythema, tonsils not enlarged  Mouth: no sores or lesions, moist mucous membranes  Neck: FROM, soft, supple, no thyromegaly, no bruits  Lymph: no anterior or " posterior cervical adenopathy  Heart::  Regular rate and rhythm, no murmur  Lung: Clear to ascultation bilaterally, no wheezing, no rales, no rhonchi, no distress  Abdomen: Soft, nontender, no distention, no hepatosplenomegaly, bowel sounds normal, no guarding, no rebound, no peritoneal signs  Skin: no rashes, no lesions, first toe with thickened yellow nail   Extremities: no edema, no cyanosis  Neuro: CN 2-12 intact, 5/5 muscle strength upper and lower extremity bilaterally, 2+ DTRs UE and LE bilaterally, normal gait  Peripheral pulses: 2+ pedal pulses bilaterally, good perfusion and color  Musculoskeletal: FROM, good strenth, no tenderness  Joint: normal appearance, no swelling, no warmth, deformity in bilateral hands     Assessment:       1. Essential hypertension    2. Hyperlipidemia, unspecified hyperlipidemia type    3. Hypothyroidism, unspecified type    4. Irritable bowel syndrome, unspecified type    5. Benign non-nodular prostatic hyperplasia without lower urinary tract symptoms    6. Onychomycosis    7. Primary insomnia    8. Primary osteoarthritis of both hands    9. Osteoarthritis of spine with radiculopathy, cervical region        Plan:       Essential hypertension  Well controlled and continue current regimen.     Hyperlipidemia, unspecified hyperlipidemia type  Good control on low dose crestor.     Hypothyroidism, unspecified type  Good control on levothyroxine.    Irritable bowel syndrome, unspecified type  Stable on doxepin 100 mg qhs for many years.     Benign non-nodular prostatic hyperplasia without lower urinary tract symptoms  Well controlled and continue current regimen.     Onychomycosis  Will start treatment for 12 weeks. Will check LFTs in 4 weeks on medication.   -     terbinafine HCL (LAMISIL) 250 mg tablet; Take 1 tablet (250 mg total) by mouth once daily.  Dispense: 90 tablet; Refill: 0  -     Comprehensive Metabolic Panel; Future; Expected date: 06/28/2022    Primary insomnia  Good  control on temazepam.      Primary osteoarthritis of both hands  Stable and controlled on mobic.    Osteoarthritis of spine with radiculopathy, cervical region  Controlled on gabapentin nightly and mobic.     Follow up in about 6 months (around 12/28/2022) for chronic medical issues.

## 2022-06-29 ENCOUNTER — IMMUNIZATION (OUTPATIENT)
Dept: FAMILY MEDICINE | Facility: CLINIC | Age: 77
End: 2022-06-29
Payer: MEDICARE

## 2022-06-29 ENCOUNTER — OFFICE VISIT (OUTPATIENT)
Dept: OPTOMETRY | Facility: CLINIC | Age: 77
End: 2022-06-29
Payer: COMMERCIAL

## 2022-06-29 DIAGNOSIS — Z23 NEED FOR VACCINATION: Primary | ICD-10-CM

## 2022-06-29 DIAGNOSIS — H52.7 REFRACTIVE ERROR: Primary | ICD-10-CM

## 2022-06-29 DIAGNOSIS — H25.13 NUCLEAR SCLEROSIS OF BOTH EYES: ICD-10-CM

## 2022-06-29 DIAGNOSIS — H40.013 OPEN ANGLE WITH BORDERLINE FINDINGS AND LOW GLAUCOMA RISK IN BOTH EYES: ICD-10-CM

## 2022-06-29 PROCEDURE — 92014 COMPRE OPH EXAM EST PT 1/>: CPT | Mod: S$GLB,,, | Performed by: OPTOMETRIST

## 2022-06-29 PROCEDURE — 92015 DETERMINE REFRACTIVE STATE: CPT | Mod: S$GLB,,, | Performed by: OPTOMETRIST

## 2022-06-29 PROCEDURE — 92015 PR REFRACTION: ICD-10-PCS | Mod: S$GLB,,, | Performed by: OPTOMETRIST

## 2022-06-29 PROCEDURE — 0054A COVID-19, MRNA, LNP-S, PF, 30 MCG/0.3 ML DOSE VACCINE (PFIZER): CPT | Mod: PBBFAC | Performed by: FAMILY MEDICINE

## 2022-06-29 PROCEDURE — 99999 PR PBB SHADOW E&M-EST. PATIENT-LVL III: ICD-10-PCS | Mod: PBBFAC,,, | Performed by: OPTOMETRIST

## 2022-06-29 PROCEDURE — 92014 PR EYE EXAM, EST PATIENT,COMPREHESV: ICD-10-PCS | Mod: S$GLB,,, | Performed by: OPTOMETRIST

## 2022-06-29 PROCEDURE — 99999 PR PBB SHADOW E&M-EST. PATIENT-LVL III: CPT | Mod: PBBFAC,,, | Performed by: OPTOMETRIST

## 2022-06-29 RX ORDER — LOSARTAN POTASSIUM 50 MG/1
TABLET ORAL
Qty: 90 TABLET | Refills: 3 | Status: SHIPPED | OUTPATIENT
Start: 2022-06-29 | End: 2023-06-24

## 2022-06-29 RX ORDER — AMLODIPINE BESYLATE 5 MG/1
TABLET ORAL
Qty: 90 TABLET | Refills: 3 | Status: SHIPPED | OUTPATIENT
Start: 2022-06-29 | End: 2023-06-24

## 2022-06-29 NOTE — TELEPHONE ENCOUNTER
No new care gaps identified.  [facility] Embedded Care Gaps. Reference number: [id number]. 6/29/2022   3:24:27 AM CDT

## 2022-06-29 NOTE — TELEPHONE ENCOUNTER
Refill Decision Note   Eric Barronsara  is requesting a refill authorization.  Brief Assessment and Rationale for Refill:  Approve     Medication Therapy Plan:       Medication Reconciliation Completed: No   Comments:     No Care Gaps recommended.     Note composed:12:25 PM 06/29/2022

## 2022-06-29 NOTE — PROGRESS NOTES
HPI     Dle- 02/11/2021    Pt here for routine eye exam. Pt sts no changes to va. Denies   flashes/floaters/eye pain. Gtts: clear eye prn after working in the yard.       Last edited by Brandi Puente MA on 6/29/2022 10:43 AM. (History)            Assessment /Plan     For exam results, see Encounter Report.    Refractive error    Nuclear sclerosis of both eyes    Open angle with borderline findings and low glaucoma risk in both eyes      1. Spectacle Rx given, discussed different options for glasses. RTC 1 year routine eye exam.  2. Educated pt on presence of cataracts and effects on vision. No surgery at this time. Recheck in one year.  3.  Low risk based on CD, iop normal, no fam history OCT of rnfl normal, RTC yearly.

## 2022-09-11 DIAGNOSIS — M47.816 SPONDYLOSIS OF LUMBAR REGION WITHOUT MYELOPATHY OR RADICULOPATHY: ICD-10-CM

## 2022-09-14 RX ORDER — MELOXICAM 15 MG/1
TABLET ORAL
Qty: 90 TABLET | Refills: 3 | OUTPATIENT
Start: 2022-09-14

## 2022-11-03 ENCOUNTER — TELEPHONE (OUTPATIENT)
Dept: FAMILY MEDICINE | Facility: CLINIC | Age: 77
End: 2022-11-03
Payer: MEDICARE

## 2022-11-18 ENCOUNTER — TELEPHONE (OUTPATIENT)
Dept: FAMILY MEDICINE | Facility: CLINIC | Age: 77
End: 2022-11-18
Payer: MEDICARE

## 2022-11-18 NOTE — TELEPHONE ENCOUNTER
----- Message from Dang Waters sent at 11/18/2022  1:19 PM CST -----  Who Called: patient    What is the reqeust in detail: Requesting call back to schedule flu shot on mon-wed around mid afternoon. Please advise.     Can the clinic reply by [facility]? No    Best Call Back Number: [phone]    Additional Information:

## 2022-11-23 ENCOUNTER — CLINICAL SUPPORT (OUTPATIENT)
Dept: FAMILY MEDICINE | Facility: CLINIC | Age: 77
End: 2022-11-23
Payer: MEDICARE

## 2022-11-23 PROCEDURE — 90694 FLU VACCINE - QUADRIVALENT - ADJUVANTED: ICD-10-PCS | Mod: S$GLB,,, | Performed by: INTERNAL MEDICINE

## 2022-11-23 PROCEDURE — 90694 VACC AIIV4 NO PRSRV 0.5ML IM: CPT | Mod: S$GLB,,, | Performed by: INTERNAL MEDICINE

## 2022-11-23 PROCEDURE — G0008 FLU VACCINE - QUADRIVALENT - ADJUVANTED: ICD-10-PCS | Mod: S$GLB,,, | Performed by: INTERNAL MEDICINE

## 2022-11-23 PROCEDURE — G0008 ADMIN INFLUENZA VIRUS VAC: HCPCS | Mod: S$GLB,,, | Performed by: INTERNAL MEDICINE

## 2022-12-07 ENCOUNTER — IMMUNIZATION (OUTPATIENT)
Dept: FAMILY MEDICINE | Facility: CLINIC | Age: 77
End: 2022-12-07
Payer: MEDICARE

## 2022-12-07 DIAGNOSIS — Z23 NEED FOR VACCINATION: Primary | ICD-10-CM

## 2022-12-07 PROCEDURE — 0124A COVID-19, MRNA, LNP-S, BIVALENT BOOSTER, PF, 30 MCG/0.3 ML DOSE: CPT | Mod: PBBFAC | Performed by: FAMILY MEDICINE

## 2022-12-07 PROCEDURE — 91312 COVID-19, MRNA, LNP-S, BIVALENT BOOSTER, PF, 30 MCG/0.3 ML DOSE: CPT | Mod: S$GLB,,, | Performed by: FAMILY MEDICINE

## 2022-12-07 PROCEDURE — 91312 COVID-19, MRNA, LNP-S, BIVALENT BOOSTER, PF, 30 MCG/0.3 ML DOSE: ICD-10-PCS | Mod: S$GLB,,, | Performed by: FAMILY MEDICINE

## 2022-12-12 DIAGNOSIS — F51.01 PRIMARY INSOMNIA: ICD-10-CM

## 2022-12-12 RX ORDER — TEMAZEPAM 15 MG/1
15 CAPSULE ORAL NIGHTLY
Qty: 90 CAPSULE | Refills: 1 | Status: SHIPPED | OUTPATIENT
Start: 2022-12-12 | End: 2023-06-12 | Stop reason: SDUPTHER

## 2022-12-12 NOTE — TELEPHONE ENCOUNTER
No new care gaps identified.  [facility] Embedded Care Gaps. Reference number: [id number]. 12/12/2022   10:20:46 AM CST

## 2023-01-05 ENCOUNTER — OFFICE VISIT (OUTPATIENT)
Dept: FAMILY MEDICINE | Facility: CLINIC | Age: 78
End: 2023-01-05
Payer: MEDICARE

## 2023-01-05 VITALS
OXYGEN SATURATION: 97 % | HEIGHT: 71 IN | TEMPERATURE: 98 F | DIASTOLIC BLOOD PRESSURE: 70 MMHG | SYSTOLIC BLOOD PRESSURE: 132 MMHG | RESPIRATION RATE: 20 BRPM | BODY MASS INDEX: 25.03 KG/M2 | HEART RATE: 89 BPM | WEIGHT: 178.81 LBS

## 2023-01-05 DIAGNOSIS — I10 ESSENTIAL HYPERTENSION: Primary | ICD-10-CM

## 2023-01-05 DIAGNOSIS — F51.01 PRIMARY INSOMNIA: ICD-10-CM

## 2023-01-05 DIAGNOSIS — E78.5 HYPERLIPIDEMIA, UNSPECIFIED HYPERLIPIDEMIA TYPE: ICD-10-CM

## 2023-01-05 DIAGNOSIS — M19.042 PRIMARY OSTEOARTHRITIS OF BOTH HANDS: ICD-10-CM

## 2023-01-05 DIAGNOSIS — Z12.5 SCREENING FOR PROSTATE CANCER: ICD-10-CM

## 2023-01-05 DIAGNOSIS — M47.22 OSTEOARTHRITIS OF SPINE WITH RADICULOPATHY, CERVICAL REGION: ICD-10-CM

## 2023-01-05 DIAGNOSIS — K58.9 IRRITABLE BOWEL SYNDROME, UNSPECIFIED TYPE: ICD-10-CM

## 2023-01-05 DIAGNOSIS — M19.041 PRIMARY OSTEOARTHRITIS OF BOTH HANDS: ICD-10-CM

## 2023-01-05 DIAGNOSIS — N40.0 BENIGN NON-NODULAR PROSTATIC HYPERPLASIA WITHOUT LOWER URINARY TRACT SYMPTOMS: ICD-10-CM

## 2023-01-05 DIAGNOSIS — E03.9 HYPOTHYROIDISM, UNSPECIFIED TYPE: ICD-10-CM

## 2023-01-05 DIAGNOSIS — Z12.83 SCREENING EXAM FOR SKIN CANCER: ICD-10-CM

## 2023-01-05 PROCEDURE — 3078F PR MOST RECENT DIASTOLIC BLOOD PRESSURE < 80 MM HG: ICD-10-PCS | Mod: CPTII,S$GLB,, | Performed by: INTERNAL MEDICINE

## 2023-01-05 PROCEDURE — 3075F SYST BP GE 130 - 139MM HG: CPT | Mod: CPTII,S$GLB,, | Performed by: INTERNAL MEDICINE

## 2023-01-05 PROCEDURE — 3078F DIAST BP <80 MM HG: CPT | Mod: CPTII,S$GLB,, | Performed by: INTERNAL MEDICINE

## 2023-01-05 PROCEDURE — 1101F PT FALLS ASSESS-DOCD LE1/YR: CPT | Mod: CPTII,S$GLB,, | Performed by: INTERNAL MEDICINE

## 2023-01-05 PROCEDURE — 1126F PR PAIN SEVERITY QUANTIFIED, NO PAIN PRESENT: ICD-10-PCS | Mod: CPTII,S$GLB,, | Performed by: INTERNAL MEDICINE

## 2023-01-05 PROCEDURE — 1126F AMNT PAIN NOTED NONE PRSNT: CPT | Mod: CPTII,S$GLB,, | Performed by: INTERNAL MEDICINE

## 2023-01-05 PROCEDURE — 99214 OFFICE O/P EST MOD 30 MIN: CPT | Mod: S$GLB,,, | Performed by: INTERNAL MEDICINE

## 2023-01-05 PROCEDURE — 3075F PR MOST RECENT SYSTOLIC BLOOD PRESS GE 130-139MM HG: ICD-10-PCS | Mod: CPTII,S$GLB,, | Performed by: INTERNAL MEDICINE

## 2023-01-05 PROCEDURE — 1160F RVW MEDS BY RX/DR IN RCRD: CPT | Mod: CPTII,S$GLB,, | Performed by: INTERNAL MEDICINE

## 2023-01-05 PROCEDURE — 3288F FALL RISK ASSESSMENT DOCD: CPT | Mod: CPTII,S$GLB,, | Performed by: INTERNAL MEDICINE

## 2023-01-05 PROCEDURE — 1101F PR PT FALLS ASSESS DOC 0-1 FALLS W/OUT INJ PAST YR: ICD-10-PCS | Mod: CPTII,S$GLB,, | Performed by: INTERNAL MEDICINE

## 2023-01-05 PROCEDURE — 1159F MED LIST DOCD IN RCRD: CPT | Mod: CPTII,S$GLB,, | Performed by: INTERNAL MEDICINE

## 2023-01-05 PROCEDURE — 3288F PR FALLS RISK ASSESSMENT DOCUMENTED: ICD-10-PCS | Mod: CPTII,S$GLB,, | Performed by: INTERNAL MEDICINE

## 2023-01-05 PROCEDURE — 99214 PR OFFICE/OUTPT VISIT, EST, LEVL IV, 30-39 MIN: ICD-10-PCS | Mod: S$GLB,,, | Performed by: INTERNAL MEDICINE

## 2023-01-05 PROCEDURE — 1159F PR MEDICATION LIST DOCUMENTED IN MEDICAL RECORD: ICD-10-PCS | Mod: CPTII,S$GLB,, | Performed by: INTERNAL MEDICINE

## 2023-01-05 PROCEDURE — 1160F PR REVIEW ALL MEDS BY PRESCRIBER/CLIN PHARMACIST DOCUMENTED: ICD-10-PCS | Mod: CPTII,S$GLB,, | Performed by: INTERNAL MEDICINE

## 2023-01-05 NOTE — PROGRESS NOTES
Subjective:       Patient ID: Eric Buitrago is a 77 y.o. male.    Medication List with Changes/Refills   Current Medications    AMLODIPINE (NORVASC) 5 MG TABLET    TAKE 1 TABLET(5 MG) BY MOUTH EVERY DAY    AZELASTINE (ASTELIN) 137 MCG (0.1 %) NASAL SPRAY    1 spray (137 mcg total) by Nasal route 2 (two) times daily.    DOXEPIN (SINEQUAN) 50 MG CAPSULE    Take 2 capsules (100 mg total) by mouth every evening.    FEXOFENADINE (ALLEGRA) 60 MG TABLET    Take 1 tablet by mouth.     GABAPENTIN (NEURONTIN) 300 MG CAPSULE    TAKE 1 CAPSULE(300 MG) BY MOUTH EVERY EVENING    LEVOTHYROXINE (SYNTHROID) 137 MCG TAB TABLET    Take 1 tablet (137 mcg total) by mouth before breakfast.    LOSARTAN (COZAAR) 50 MG TABLET    TAKE 1 TABLET(50 MG) BY MOUTH EVERY DAY    MELOXICAM (MOBIC) 15 MG TABLET    TAKE 1 TABLET(15 MG) BY MOUTH EVERY DAY    ROSUVASTATIN (CRESTOR) 5 MG TABLET    TAKE 1 TABLET(5 MG) BY MOUTH EVERY DAY    TAMSULOSIN (FLOMAX) 0.4 MG CAP    TAKE 1 CAPSULE BY MOUTH EVERY EVENING    TEMAZEPAM (RESTORIL) 15 MG CAP    Take 1 capsule (15 mg total) by mouth every night at bedtime.       Chief Complaint: Follow-up  He is here today to f/u on chronic medical issues.        He has hypertension and is taking losartan 50 mg qday and amlodipine 5 mg daily.  He denies any CAD. He denies chest pain or shortness of breath. He does not check his BP at home.     He has hyperlipidemia and is taking crestor 5 mg qday. His last lipids on 6/2022 were 106/76/38/52.      He has hypothyroid that is controlled on levothyroxine 137 mcg qday. His last TSH was normal on 6/2022.      He has BPH which is controlled with flomax. He says this helps with his frequency and nocturia. PSA on 6/2021 was 0.95.       He has seasonal allergies controlled with allegra as needed and astelin which he takes daily.  He denies any active symptoms.         He has IBS for many years. He was seen by GI about 10 years ago and started on doxepin for spastic colon.  He  says this has worked and he no longer has any symptoms.  His most recent colonoscopy on 2/2018 had no polyps but did show mild colonic spasms. He continues on doxepin to 100 mg daily and feels his symptoms are controlled on this dose.      He has insomnia that is controlled with PRN temazepam 15 mg 1/2 pill qhs 3 times a week. He says it works and denies side effects.  Last fill was #90 on 6/28/2022.         He occasionally has low back pain if he over exerts himself. He will take mobic with good relief.  He is pain free today. Pain worse with bending or lifting.  Better with stretching and rest.       He has shoulder pain with intermittent radiation down left arm. He was seen by pain management and had an MRI on 1/2019 showing Multilevel spondylosis, greatest at C5-6 where there is mild cord flattening with effacement of ventral and dorsal CSF.  No cord signal abnormality.Multilevel foraminal stenosis, greatest at C5-6 where it is moderate-severe bilaterally. Moderate multilevel hypertrophic facet arthropathy.  He was seen by pain and started on gabaepntin 300 mg qhs which has helped with his pain.  If worsens then pain would like to do a cervical steroid injection.  He completed PT without much difference in his pain.  He reports gabapentin completely controls his pain.       He has osteoarthritis of both hands left > right. 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 He says years ago he had a steroid injection in his thumb that helped a similar problem. Rheumatologic workup was negative on 6/2021.        He lives with his wife and feels safe at home. He is very active and enjoys working in his yard. He goes to the gym 3 times a week for one hour and does treadmill and weight training. He does eat healthy. No falls or balance issues.      Colonoscopy---2/2018 repeat in 10 years  Tdap---9/2017  Influenza vaccine---11/2022  Pneumovax  "23----6/2018  Prevnar 13----6/2017  Shingles vaccine----- 11/2018, 1/2019   Covid vaccine---5 doses       Review of Systems   Constitutional:  Negative for appetite change, fatigue, fever and unexpected weight change.   HENT:  Negative for congestion, ear pain, hearing loss, sore throat and trouble swallowing.    Eyes:  Negative for pain and visual disturbance.   Respiratory:  Negative for cough, chest tightness, shortness of breath and wheezing.    Cardiovascular:  Negative for chest pain, palpitations and leg swelling.   Gastrointestinal:  Negative for abdominal pain, blood in stool, constipation, diarrhea, nausea and vomiting.   Endocrine: Negative for polyuria.   Genitourinary:  Negative for dysuria and hematuria.   Musculoskeletal:  Negative for arthralgias, back pain and myalgias.   Skin:  Negative for rash.   Neurological:  Negative for dizziness, weakness, numbness and headaches.   Hematological:  Does not bruise/bleed easily.   Psychiatric/Behavioral:  Positive for sleep disturbance. Negative for dysphoric mood and suicidal ideas. The patient is not nervous/anxious.      Objective:      Vitals:    01/05/23 0940   BP: 132/70   Pulse: 89   Resp: 20   Temp: 97.9 °F (36.6 °C)   SpO2: 97%   Weight: 81.1 kg (178 lb 12.7 oz)   Height: 5' 11" (1.803 m)     Body mass index is 24.94 kg/m².  Physical Exam    General appearance: No acute distress, cooperative  Eyes: PERRL, EOMI, conjunctiva clear  Ears: normal external ear and pinna, tm clear without drainage, canals clear  Nose: Normal mucosa without drainage  Throat: no exudates or erythema, tonsils not enlarged  Mouth: no sores or lesions, moist mucous membranes  Neck: FROM, soft, supple, no thyromegaly, no bruits  Lymph: no anterior or posterior cervical adenopathy  Heart::  Regular rate and rhythm, no murmur  Lung: Clear to ascultation bilaterally, no wheezing, no rales, no rhonchi, no distress  Abdomen: Soft, nontender, no distention, no hepatosplenomegaly, bowel " sounds normal, no guarding, no rebound, no peritoneal signs  Skin: no rashes, multiple skin lesions   Extremities: no edema, no cyanosis  Neuro: CN 2-12 intact, 5/5 muscle strength upper and lower extremity bilaterally, 2+ DTRs UE and LE bilaterally, normal gait  Peripheral pulses: 2+ pedal pulses bilaterally, good perfusion and color  Musculoskeletal: FROM, good strenth, no tenderness  Joint: normal appearance, no swelling, no warmth,deformity with contractures in hands    Assessment:       1. Essential hypertension    2. Hyperlipidemia, unspecified hyperlipidemia type    3. Hypothyroidism, unspecified type    4. Irritable bowel syndrome, unspecified type    5. Benign non-nodular prostatic hyperplasia without lower urinary tract symptoms    6. Primary insomnia    7. Primary osteoarthritis of both hands    8. Osteoarthritis of spine with radiculopathy, cervical region    9. Screening exam for skin cancer    10. Screening for prostate cancer        Plan:       Essential hypertension  Well controlled and continue current regimen.   -     CBC Auto Differential; Future; Expected date: 01/05/2023  -     Comprehensive Metabolic Panel; Future; Expected date: 01/05/2023  -     Lipid Panel; Future; Expected date: 01/05/2023  -     TSH; Future; Expected date: 01/05/2023    Hyperlipidemia, unspecified hyperlipidemia type  Good control on crestor.    Hypothyroidism, unspecified type  Good control on this dose of levothyroxine.     Irritable bowel syndrome, unspecified type  STable on doxepin. Discussed further decreasing his dose but he is hesitant because it works so well.  Okay to continue on 100 mg daily.    Benign non-nodular prostatic hyperplasia without lower urinary tract symptoms  Good control on flomax.    Primary insomnia  Stable and uses temazepam three times a week at low dose. No evidence of abuse by .    Primary osteoarthritis of both hands  He still has function in hands and uses mobic daily.    Osteoarthritis  of spine with radiculopathy, cervical region  Good control on mobic with gabpaentin.    Screening exam for skin cancer  -     Ambulatory referral/consult to Dermatology; Future; Expected date: 01/12/2023    Screening for prostate cancer  -     PSA, Screening; Future; Expected date: 01/05/2023    Follow up in about 6 months (around 7/5/2023) for chronic medical issues.

## 2023-03-22 ENCOUNTER — TELEPHONE (OUTPATIENT)
Dept: FAMILY MEDICINE | Facility: CLINIC | Age: 78
End: 2023-03-22
Payer: MEDICARE

## 2023-03-22 RX ORDER — AMOXICILLIN AND CLAVULANATE POTASSIUM 875; 125 MG/1; MG/1
1 TABLET, FILM COATED ORAL 2 TIMES DAILY
Qty: 20 TABLET | Refills: 0 | Status: SHIPPED | OUTPATIENT
Start: 2023-03-22 | End: 2024-01-11

## 2023-03-22 NOTE — TELEPHONE ENCOUNTER
Pt states he has had a cough producing yellow mucous, stuffy nose, lack of energy, and chills since 3/18/23.  He is in [place] visiting his sister and wanted to know if you could call him in an abx to help him get over this.  Please advise.  Thanks.

## 2023-03-22 NOTE — TELEPHONE ENCOUNTER
----- Message from Moisés Maddox sent at 3/22/2023  2:30 PM CDT -----  Type: Needs Medical Advice  Who Called:  Patient  Symptoms (please be specific):  Sinus infection, yellow mucous  How long has patient had these symptoms:   A few days    Pharmacy name and phone #:    [facility]  [address], [place] [address]    [phone]    Best Call Back Number: [phone]  Additional Information: Please call to advise

## 2023-06-12 DIAGNOSIS — F51.01 PRIMARY INSOMNIA: ICD-10-CM

## 2023-06-12 RX ORDER — TEMAZEPAM 15 MG/1
15 CAPSULE ORAL NIGHTLY
Qty: 90 CAPSULE | Refills: 1 | Status: CANCELLED | OUTPATIENT
Start: 2023-06-12

## 2023-06-12 NOTE — TELEPHONE ENCOUNTER
No care due was identified.  [facility] Embedded Care Due Messages. Reference number: [id number].   6/12/2023 5:25:40 PM CDT

## 2023-06-13 RX ORDER — TEMAZEPAM 15 MG/1
15 CAPSULE ORAL NIGHTLY
Qty: 90 CAPSULE | Refills: 1 | Status: SHIPPED | OUTPATIENT
Start: 2023-06-13 | End: 2023-12-14 | Stop reason: SDUPTHER

## 2023-06-24 RX ORDER — LOSARTAN POTASSIUM 50 MG/1
TABLET ORAL
Qty: 90 TABLET | Refills: 3 | Status: SHIPPED | OUTPATIENT
Start: 2023-06-24

## 2023-06-24 RX ORDER — AMLODIPINE BESYLATE 5 MG/1
TABLET ORAL
Qty: 90 TABLET | Refills: 1 | Status: SHIPPED | OUTPATIENT
Start: 2023-06-24 | End: 2023-07-24

## 2023-06-24 NOTE — TELEPHONE ENCOUNTER
Refill Routing Note   Medication(s) are not appropriate for processing by [facility] for the following reason(s):      Required labs outdated    [facility] action(s):  Approve  Defer Care Due:  None identified          Appointments  past 12m or future 3m with PCP    Date Provider   Last Visit   1/5/2023 Maddi Gastelum, DO   Next Visit   7/11/2023 Maddi Gastelum, DO   ED visits in past 90 days: 0        Note composed:6:59 AM 06/24/2023

## 2023-06-24 NOTE — TELEPHONE ENCOUNTER
No care due was identified.  [facility] Embedded Care Due Messages. Reference number: [id number].   6/24/2023 3:22:49 AM CDT

## 2023-07-07 ENCOUNTER — LAB VISIT (OUTPATIENT)
Dept: LAB | Facility: HOSPITAL | Age: 78
End: 2023-07-07
Attending: INTERNAL MEDICINE
Payer: MEDICARE

## 2023-07-07 DIAGNOSIS — Z12.5 SCREENING FOR PROSTATE CANCER: ICD-10-CM

## 2023-07-07 DIAGNOSIS — I10 ESSENTIAL HYPERTENSION: ICD-10-CM

## 2023-07-07 LAB
ALBUMIN SERPL BCP-MCNC: 4 G/DL (ref 3.5–5.2)
ALP SERPL-CCNC: 63 U/L (ref 55–135)
ALT SERPL W/O P-5'-P-CCNC: 30 U/L (ref 10–44)
ANION GAP SERPL CALC-SCNC: 11 MMOL/L (ref 8–16)
AST SERPL-CCNC: 23 U/L (ref 10–40)
BASOPHILS # BLD AUTO: 0.04 K/UL (ref 0–0.2)
BASOPHILS NFR BLD: 0.6 % (ref 0–1.9)
BILIRUB SERPL-MCNC: 0.7 MG/DL (ref 0.1–1)
BUN SERPL-MCNC: 18 MG/DL (ref 8–23)
CALCIUM SERPL-MCNC: 9.2 MG/DL (ref 8.7–10.5)
CHLORIDE SERPL-SCNC: 106 MMOL/L (ref 95–110)
CHOLEST SERPL-MCNC: 128 MG/DL (ref 120–199)
CHOLEST/HDLC SERPL: 3.1 {RATIO} (ref 2–5)
CO2 SERPL-SCNC: 25 MMOL/L (ref 23–29)
COMPLEXED PSA SERPL-MCNC: 1.2 NG/ML (ref 0–4)
CREAT SERPL-MCNC: 1 MG/DL (ref 0.5–1.4)
DIFFERENTIAL METHOD: ABNORMAL
EOSINOPHIL # BLD AUTO: 0.3 K/UL (ref 0–0.5)
EOSINOPHIL NFR BLD: 4.8 % (ref 0–8)
ERYTHROCYTE [DISTWIDTH] IN BLOOD BY AUTOMATED COUNT: 14.2 % (ref 11.5–14.5)
EST. GFR  (NO RACE VARIABLE): >60 ML/MIN/1.73 M^2
GLUCOSE SERPL-MCNC: 97 MG/DL (ref 70–110)
HCT VFR BLD AUTO: 44.2 % (ref 40–54)
HDLC SERPL-MCNC: 41 MG/DL (ref 40–75)
HDLC SERPL: 32 % (ref 20–50)
HGB BLD-MCNC: 15.5 G/DL (ref 14–18)
IMM GRANULOCYTES # BLD AUTO: 0.02 K/UL (ref 0–0.04)
IMM GRANULOCYTES NFR BLD AUTO: 0.3 % (ref 0–0.5)
LDLC SERPL CALC-MCNC: 68.2 MG/DL (ref 63–159)
LYMPHOCYTES # BLD AUTO: 1.9 K/UL (ref 1–4.8)
LYMPHOCYTES NFR BLD: 29.4 % (ref 18–48)
MCH RBC QN AUTO: 32 PG (ref 27–31)
MCHC RBC AUTO-ENTMCNC: 35.1 G/DL (ref 32–36)
MCV RBC AUTO: 91 FL (ref 82–98)
MONOCYTES # BLD AUTO: 0.5 K/UL (ref 0.3–1)
MONOCYTES NFR BLD: 7.7 % (ref 4–15)
NEUTROPHILS # BLD AUTO: 3.7 K/UL (ref 1.8–7.7)
NEUTROPHILS NFR BLD: 57.2 % (ref 38–73)
NONHDLC SERPL-MCNC: 87 MG/DL
NRBC BLD-RTO: 0 /100 WBC
PLATELET # BLD AUTO: 237 K/UL (ref 150–450)
PMV BLD AUTO: 10.7 FL (ref 9.2–12.9)
POTASSIUM SERPL-SCNC: 4.8 MMOL/L (ref 3.5–5.1)
PROT SERPL-MCNC: 7 G/DL (ref 6–8.4)
RBC # BLD AUTO: 4.84 M/UL (ref 4.6–6.2)
SODIUM SERPL-SCNC: 142 MMOL/L (ref 136–145)
TRIGL SERPL-MCNC: 94 MG/DL (ref 30–150)
TSH SERPL DL<=0.005 MIU/L-ACNC: 3.42 UIU/ML (ref 0.4–4)
WBC # BLD AUTO: 6.5 K/UL (ref 3.9–12.7)

## 2023-07-07 PROCEDURE — 80053 COMPREHEN METABOLIC PANEL: CPT | Performed by: INTERNAL MEDICINE

## 2023-07-07 PROCEDURE — 80061 LIPID PANEL: CPT | Performed by: INTERNAL MEDICINE

## 2023-07-07 PROCEDURE — 85025 COMPLETE CBC W/AUTO DIFF WBC: CPT | Performed by: INTERNAL MEDICINE

## 2023-07-07 PROCEDURE — 84443 ASSAY THYROID STIM HORMONE: CPT | Performed by: INTERNAL MEDICINE

## 2023-07-07 PROCEDURE — 36415 COLL VENOUS BLD VENIPUNCTURE: CPT | Mod: PN | Performed by: INTERNAL MEDICINE

## 2023-07-07 PROCEDURE — 84153 ASSAY OF PSA TOTAL: CPT | Performed by: INTERNAL MEDICINE

## 2023-07-11 ENCOUNTER — OFFICE VISIT (OUTPATIENT)
Dept: FAMILY MEDICINE | Facility: CLINIC | Age: 78
End: 2023-07-11
Payer: MEDICARE

## 2023-07-11 VITALS
WEIGHT: 173.75 LBS | BODY MASS INDEX: 24.32 KG/M2 | HEIGHT: 71 IN | TEMPERATURE: 98 F | DIASTOLIC BLOOD PRESSURE: 74 MMHG | SYSTOLIC BLOOD PRESSURE: 142 MMHG | HEART RATE: 91 BPM | OXYGEN SATURATION: 98 %

## 2023-07-11 DIAGNOSIS — F51.01 PRIMARY INSOMNIA: ICD-10-CM

## 2023-07-11 DIAGNOSIS — N40.0 BENIGN NON-NODULAR PROSTATIC HYPERPLASIA WITHOUT LOWER URINARY TRACT SYMPTOMS: ICD-10-CM

## 2023-07-11 DIAGNOSIS — M19.042 PRIMARY OSTEOARTHRITIS OF BOTH HANDS: ICD-10-CM

## 2023-07-11 DIAGNOSIS — K58.9 IRRITABLE BOWEL SYNDROME, UNSPECIFIED TYPE: ICD-10-CM

## 2023-07-11 DIAGNOSIS — I10 ESSENTIAL HYPERTENSION: Primary | ICD-10-CM

## 2023-07-11 DIAGNOSIS — M19.041 PRIMARY OSTEOARTHRITIS OF BOTH HANDS: ICD-10-CM

## 2023-07-11 DIAGNOSIS — M76.62 TENDONITIS, ACHILLES, LEFT: ICD-10-CM

## 2023-07-11 DIAGNOSIS — E78.5 HYPERLIPIDEMIA, UNSPECIFIED HYPERLIPIDEMIA TYPE: ICD-10-CM

## 2023-07-11 DIAGNOSIS — E03.9 HYPOTHYROIDISM, UNSPECIFIED TYPE: ICD-10-CM

## 2023-07-11 DIAGNOSIS — M47.22 OSTEOARTHRITIS OF SPINE WITH RADICULOPATHY, CERVICAL REGION: ICD-10-CM

## 2023-07-11 PROCEDURE — 1160F RVW MEDS BY RX/DR IN RCRD: CPT | Mod: CPTII,S$GLB,, | Performed by: INTERNAL MEDICINE

## 2023-07-11 PROCEDURE — 3077F SYST BP >= 140 MM HG: CPT | Mod: CPTII,S$GLB,, | Performed by: INTERNAL MEDICINE

## 2023-07-11 PROCEDURE — 3077F PR MOST RECENT SYSTOLIC BLOOD PRESSURE >= 140 MM HG: ICD-10-PCS | Mod: CPTII,S$GLB,, | Performed by: INTERNAL MEDICINE

## 2023-07-11 PROCEDURE — 1126F PR PAIN SEVERITY QUANTIFIED, NO PAIN PRESENT: ICD-10-PCS | Mod: CPTII,S$GLB,, | Performed by: INTERNAL MEDICINE

## 2023-07-11 PROCEDURE — 1159F PR MEDICATION LIST DOCUMENTED IN MEDICAL RECORD: ICD-10-PCS | Mod: CPTII,S$GLB,, | Performed by: INTERNAL MEDICINE

## 2023-07-11 PROCEDURE — 1101F PT FALLS ASSESS-DOCD LE1/YR: CPT | Mod: CPTII,S$GLB,, | Performed by: INTERNAL MEDICINE

## 2023-07-11 PROCEDURE — 1160F PR REVIEW ALL MEDS BY PRESCRIBER/CLIN PHARMACIST DOCUMENTED: ICD-10-PCS | Mod: CPTII,S$GLB,, | Performed by: INTERNAL MEDICINE

## 2023-07-11 PROCEDURE — 3288F FALL RISK ASSESSMENT DOCD: CPT | Mod: CPTII,S$GLB,, | Performed by: INTERNAL MEDICINE

## 2023-07-11 PROCEDURE — 1159F MED LIST DOCD IN RCRD: CPT | Mod: CPTII,S$GLB,, | Performed by: INTERNAL MEDICINE

## 2023-07-11 PROCEDURE — 99214 PR OFFICE/OUTPT VISIT, EST, LEVL IV, 30-39 MIN: ICD-10-PCS | Mod: S$GLB,,, | Performed by: INTERNAL MEDICINE

## 2023-07-11 PROCEDURE — 1126F AMNT PAIN NOTED NONE PRSNT: CPT | Mod: CPTII,S$GLB,, | Performed by: INTERNAL MEDICINE

## 2023-07-11 PROCEDURE — 3078F DIAST BP <80 MM HG: CPT | Mod: CPTII,S$GLB,, | Performed by: INTERNAL MEDICINE

## 2023-07-11 PROCEDURE — 3288F PR FALLS RISK ASSESSMENT DOCUMENTED: ICD-10-PCS | Mod: CPTII,S$GLB,, | Performed by: INTERNAL MEDICINE

## 2023-07-11 PROCEDURE — 99214 OFFICE O/P EST MOD 30 MIN: CPT | Mod: S$GLB,,, | Performed by: INTERNAL MEDICINE

## 2023-07-11 PROCEDURE — 3078F PR MOST RECENT DIASTOLIC BLOOD PRESSURE < 80 MM HG: ICD-10-PCS | Mod: CPTII,S$GLB,, | Performed by: INTERNAL MEDICINE

## 2023-07-11 PROCEDURE — 1101F PR PT FALLS ASSESS DOC 0-1 FALLS W/OUT INJ PAST YR: ICD-10-PCS | Mod: CPTII,S$GLB,, | Performed by: INTERNAL MEDICINE

## 2023-07-11 RX ORDER — DICLOFENAC SODIUM 10 MG/G
2 GEL TOPICAL 2 TIMES DAILY
Qty: 100 G | Refills: 4 | Status: SHIPPED | OUTPATIENT
Start: 2023-07-11

## 2023-07-11 RX ORDER — GABAPENTIN 300 MG/1
300 CAPSULE ORAL NIGHTLY
Qty: 90 CAPSULE | Refills: 2 | Status: SHIPPED | OUTPATIENT
Start: 2023-07-11

## 2023-07-11 NOTE — PROGRESS NOTES
Subjective:       Patient ID: Eric Buitrago is a 78 y.o. male.    Medication List with Changes/Refills   New Medications    DICLOFENAC SODIUM (VOLTAREN) 1 % GEL    Apply 2 g topically 2 (two) times daily.   Current Medications    AMLODIPINE (NORVASC) 5 MG TABLET    TAKE 1 TABLET(5 MG) BY MOUTH EVERY DAY    AMOXICILLIN-CLAVULANATE 875-125MG (AUGMENTIN) 875-125 MG PER TABLET    Take 1 tablet by mouth 2 (two) times daily.    AZELASTINE (ASTELIN) 137 MCG (0.1 %) NASAL SPRAY    USE 1 SPRAY NASALLY TWICE DAILY    DOXEPIN (SINEQUAN) 50 MG CAPSULE    TAKE TWO CAPSULES BY MOUTH ONCE IN THE EVENING.    FEXOFENADINE (ALLEGRA) 60 MG TABLET    Take 1 tablet by mouth.     GABAPENTIN (NEURONTIN) 300 MG CAPSULE    Take 1 capsule (300 mg total) by mouth every evening.    LEVOTHYROXINE (SYNTHROID) 137 MCG TAB TABLET    Take 1 tablet (137 mcg total) by mouth before breakfast.    LOSARTAN (COZAAR) 50 MG TABLET    TAKE 1 TABLET(50 MG) BY MOUTH EVERY DAY    MELOXICAM (MOBIC) 15 MG TABLET    TAKE 1 TABLET(15 MG) BY MOUTH EVERY DAY    ROSUVASTATIN (CRESTOR) 5 MG TABLET    TAKE 1 TABLET(5 MG) BY MOUTH EVERY DAY    TAMSULOSIN (FLOMAX) 0.4 MG CAP    TAKE 1 CAPSULE BY MOUTH EVERY EVENING    TEMAZEPAM (RESTORIL) 15 MG CAP    Take 1 capsule (15 mg total) by mouth every night at bedtime.       Chief Complaint: Follow-up  He is here today to f/u on chronic medical issues.      He had covid in March and is doing much better.  He had acute symptoms for about 5 days and then 2-3 weeks of fatigue. This has all resolved.       He has hypertension and is taking losartan 50 mg qday and amlodipine 5 mg daily.  He denies any CAD. He denies chest pain or shortness of breath. He does not check his BP at home.     He has hyperlipidemia and is taking crestor 5 mg qday. His lipids on 7/2023 were 128/94/41/68.      He has hypothyroid that is controlled on levothyroxine 137 mcg qday. His last TSH was normal on 7/2023.       He has BPH which is controlled with  flomax. He says this helps with his frequency and nocturia. PSA on 7/2023 was 1.2.       He has seasonal allergies controlled with allegra as needed and astelin which he takes daily.  He denies any active symptoms.         He has IBS for many years. He was seen by GI about 10 years ago and started on doxepin for spastic colon.  He says this has worked and he no longer has any symptoms.  His most recent colonoscopy on 2/2018 had no polyps but did show mild colonic spasms. He continues on doxepin to 100 mg daily and feels his symptoms are controlled on this dose.      He has insomnia that is controlled with PRN temazepam 15 mg 1/2 pill to 1 pill nightly.  He says it works and denies side effects.          He occasionally has low back pain if he over exerts himself. He will take mobic with good relief.  He is pain free today. Pain worse with bending or lifting.  Better with stretching and rest.       He has shoulder pain with intermittent radiation down left arm. He was seen by pain management and had an MRI on 1/2019 showing Multilevel spondylosis, greatest at C5-6 where there is mild cord flattening with effacement of ventral and dorsal CSF.  No cord signal abnormality.Multilevel foraminal stenosis, greatest at C5-6 where it is moderate-severe bilaterally. Moderate multilevel hypertrophic facet arthropathy.  He was seen by pain and started on gabaepntin 300 mg qhs which has helped with his pain.  If worsens then pain would like to do a cervical steroid injection.  He completed PT without much difference in his pain.  He reports gabapentin 300 mg nightly completely controls his pain.       He has osteoarthritis of both hands left > right. 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 He says years ago he had a steroid injection in his thumb that helped a similar problem. Rheumatologic workup was negative on 6/2021.       He wore new boots during his trip  "to [place] in March and for the last 3 month has pain in his left achilles tendon. Worse after doing his exercising and physical activity. Better with rest and ice. No redness or warmth. No swelling.      He lives with his wife and feels safe at home. He is very active and enjoys working in his yard. He goes to the gym 3 times a week for one hour and does treadmill and weight training. He does eat healthy. No falls or balance issues.      Colonoscopy---2/2018 repeat in 10 years  Tdap---9/2017  Influenza vaccine---11/2022  Pneumovax 23----6/2018  Prevnar 13----6/2017  Shingles vaccine----- 11/2018, 1/2019   Covid vaccine---5 doses       Review of Systems   Constitutional:  Negative for appetite change, fatigue, fever and unexpected weight change.   HENT:  Negative for congestion, ear pain, hearing loss, sore throat and trouble swallowing.    Eyes:  Negative for pain and visual disturbance.   Respiratory:  Negative for cough, chest tightness, shortness of breath and wheezing.    Cardiovascular:  Negative for chest pain, palpitations and leg swelling.   Gastrointestinal:  Negative for abdominal pain, blood in stool, constipation, diarrhea, nausea and vomiting.   Endocrine: Negative for polyuria.   Genitourinary:  Negative for dysuria and hematuria.   Musculoskeletal:  Positive for arthralgias. Negative for back pain and myalgias.   Skin:  Negative for rash.   Neurological:  Negative for dizziness, weakness, numbness and headaches.   Hematological:  Does not bruise/bleed easily.   Psychiatric/Behavioral:  Negative for dysphoric mood, sleep disturbance and suicidal ideas. The patient is not nervous/anxious.      Objective:      Vitals:    07/11/23 1024   BP: (!) 142/74   BP Location: Left arm   Patient Position: Sitting   BP Method: Medium (Manual)   Pulse: 91   Temp: 97.7 °F (36.5 °C)   SpO2: 98%   Weight: 78.8 kg (173 lb 11.6 oz)   Height: 5' 11" (1.803 m)     Body mass index is 24.23 kg/m².  Physical Exam    General " appearance: No acute distress, cooperative  Eyes: PERRL, EOMI, conjunctiva clear  Ears: normal external ear and pinna, tm clear without drainage, canals clear  Nose: Normal mucosa without drainage  Throat: no exudates or erythema, tonsils not enlarged  Mouth: no sores or lesions, moist mucous membranes  Neck: FROM, soft, supple, no thyromegaly, no bruits  Lymph: no anterior or posterior cervical adenopathy  Heart::  Regular rate and rhythm, no murmur  Lung: Clear to ascultation bilaterally, no wheezing, no rales, no rhonchi, no distress  Abdomen: Soft, nontender, no distention, no hepatosplenomegaly, bowel sounds normal, no guarding, no rebound, no peritoneal signs  Skin: no rashes, no lesions  Extremities: no edema, no cyanosis  Neuro: CN 2-12 intact, 5/5 muscle strength upper and lower extremity bilaterally, 2+ DTRs UE and LE bilaterally, normal gait  Peripheral pulses: 2+ pedal pulses bilaterally, good perfusion and color  Musculoskeletal: FROM, good strenth, no tenderness  Joint: normal appearance, no swelling, no warmth, deformity of MCP joints of bilateral hands, tenderness on palpation of achilles tendon (no erythema, swelling or warmth)    Assessment:       1. Essential hypertension    2. Hyperlipidemia, unspecified hyperlipidemia type    3. Hypothyroidism, unspecified type    4. Irritable bowel syndrome, unspecified type    5. Benign non-nodular prostatic hyperplasia without lower urinary tract symptoms    6. Primary insomnia    7. Tendonitis, Achilles, left    8. Primary osteoarthritis of both hands    9. Osteoarthritis of spine with radiculopathy, cervical region        Plan:       Essential hypertension  Uncontrolled and advised to check BP daily. He will f/u with nurse in one month. If elevated then will increase his losartan.     Hyperlipidemia, unspecified hyperlipidemia type  Good control on crestor    Hypothyroidism, unspecified type  Good control on this dose of levothyroxine    Irritable bowel  syndrome, unspecified type  STable on doxepin    Benign non-nodular prostatic hyperplasia without lower urinary tract symptoms  Good control on flomax    Primary insomnia  Good control temazepam. No evidence of abuse by     Tendonitis, Achilles, left  Given reassurance. Advised to ice ankle nightly and with exercise. Given topical diclofenac gel to use bid.  Advised to use kinesiology to tape ankle.   -     diclofenac sodium (VOLTAREN) 1 % Gel; Apply 2 g topically 2 (two) times daily.  Dispense: 100 g; Refill: 4    Primary osteoarthritis of both hands  Stable and continue to use mobic as needed    Osteoarthritis of spine with radiculopathy, cervical region  Good control on gabapentin.     Follow up in about 6 months (around 1/11/2024) for chronic medical issues.

## 2023-07-11 NOTE — TELEPHONE ENCOUNTER
No care due was identified.  [facility] Embedded Care Due Messages. Reference number: [id number].   7/11/2023 3:22:51 AM CDT

## 2023-07-19 ENCOUNTER — CLINICAL SUPPORT (OUTPATIENT)
Dept: FAMILY MEDICINE | Facility: CLINIC | Age: 78
End: 2023-07-19
Payer: MEDICARE

## 2023-07-19 ENCOUNTER — TELEPHONE (OUTPATIENT)
Dept: FAMILY MEDICINE | Facility: CLINIC | Age: 78
End: 2023-07-19

## 2023-07-19 NOTE — TELEPHONE ENCOUNTER
"Contacted pt to obtain his home bp log. Pt reports being asymptomatic, denies complaints of pain. Pt reports taking his blood pressure medication at the same time every day, at approximately 9:00pm. Pt reports taking his blood pressure in between 9:30am-10:30am. Pt home blood pressure log is as follows:    7/12/23- BP:118/59 Pulse: 73   8:00pm BP:148/68 Pulse:65  7/13/23- BP:125/63 Pulse: 76    8:00pm BP:142/65 Pulse:64  7/14/23- BP:129/69 Pulse: 74  7/15/23- BP:127/60 Pulse: 72  7/16/23- BP:135/70 Pulse: 73  7/17/23- BP:128/63 Pulse: 75  7/18/23- BP:114/66 Pulse: 71  7/19/23- BP:121/69 Pulse: 70    Pt reports that he took his blood pressure at night Wednesday and Thursday but stopped monitoring night time blood pressures as they were "higher".    Pt is inquiring on if he should change the time he takes his bp medication from night to morning d/t higher night-time bp's? Please advise.   "

## 2023-07-19 NOTE — PROGRESS NOTES
Contacted pt to obtain his home bp log. Pt reports being asymptomatic, denies complaints of pain. Pt reports taking his blood pressure medication at the same time every day, at approximately 9:00pm. Pt reports taking his blood pressure in between 9:30am-10:30am. Pt home blood pressure log is as follows:    7/12/23- BP:118/59 Pulse: 73       8:00pm BP: 148/68 Pulse: 65  7/13/23- BP:125/63 Pulse: 76        8:00pm BP: 142/65 Pulse: 64  7/14/23- BP:129/69 Pulse: 74  7/15/23- BP:127/60 Pulse: 72  7/16/23- BP:135/70 Pulse: 73  7/17/23- BP:128/63 Pulse: 75  7/18/23- BP:114/66 Pulse: 71  7/19/23- BP:121/69 Pulse: 70    Pt reports that he took his blood pressure at night Wednesday and Thursday but stopped monitoring night time blood pressures as they were trending high.   Notified Dr. Maddi Gastelum.

## 2023-07-24 RX ORDER — AMLODIPINE BESYLATE 5 MG/1
TABLET ORAL
Qty: 90 TABLET | Refills: 1
Start: 2023-07-24 | End: 2023-11-14

## 2023-07-24 NOTE — TELEPHONE ENCOUNTER
Please have him try taking amlodipine 5 mg in the am and 1/2 a pill 2.5 mg in early evening.  Then he can recheck BP bid and nurse visit in about 10 days    Thanks

## 2023-07-26 NOTE — TELEPHONE ENCOUNTER
----- Message from Lesvia Bose sent at 7/26/2023 10:23 AM CDT -----  Contact: Patient  Type:  Patient Returning Call    Who Called:  Patient  Who Left Message for Patient:    Doesn't know  Does the patient know what this is regarding?:  Instructions    Would the patient rather a call back or a response via [facility]?   Call back  Best Call Back Number:  [phone]    Additional Information: States he is returning a missed call about medication instructions - please call to advise - thank you

## 2023-08-08 ENCOUNTER — TELEPHONE (OUTPATIENT)
Dept: FAMILY MEDICINE | Facility: CLINIC | Age: 78
End: 2023-08-08

## 2023-08-08 ENCOUNTER — CLINICAL SUPPORT (OUTPATIENT)
Dept: FAMILY MEDICINE | Facility: CLINIC | Age: 78
End: 2023-08-08
Payer: MEDICARE

## 2023-08-08 DIAGNOSIS — I10 ESSENTIAL HYPERTENSION: Primary | ICD-10-CM

## 2023-08-08 NOTE — TELEPHONE ENCOUNTER
BARRY    Pt dropped off his home bp log and left before being seen. Contacted pt as a telephone nurse visit to obtain needed information.     Pt reports being asymptomatic and denies pain; he agrees that he takes his bp medication at nearly the same time daily. He is currently taking amlodipine one 5mg tablet and losartan 50mg tablet once daily every morning in between 9:30-10:000AM, and one half of a tablet of amlodipine (equaling 2.5mg) early evening in between 6:30-7:00PM. His last dose of BP medication was this morning at approx. 9:30am. Average bp of upper 120's/low 70's, with average pulse being in the upper 60's.     Pt home bp log placed on your desk for review if needed.

## 2023-08-08 NOTE — PROGRESS NOTES
Pt dropped off his home bp log and left before being seen. Contacted pt as a telephone nurse visit to obtain needed information. Pt reports being asymptomatic and denies pain; he agrees that he takes his bp medication at nearly the same time daily. He is currently taking amlodipine one 5mg tablet and losartan 50mg tablet once daily every morning in between 9:30-10:000AM, and one half of a tablet of amlodipine (equaling 2.5mg) early evening in between 6:30-7:00PM. His last dose of BP medication was this morning at approx. 9:30am. Average bp of upper 120's/low 70's, with average pulse being in the upper 60's.     Dr. Gastelum notified.

## 2023-08-09 VITALS — SYSTOLIC BLOOD PRESSURE: 132 MMHG | DIASTOLIC BLOOD PRESSURE: 72 MMHG

## 2023-09-02 DIAGNOSIS — M47.816 SPONDYLOSIS OF LUMBAR REGION WITHOUT MYELOPATHY OR RADICULOPATHY: ICD-10-CM

## 2023-09-02 NOTE — TELEPHONE ENCOUNTER
No care due was identified.  [facility] Embedded Care Due Messages. Reference number: [id number].   9/02/2023 3:23:55 AM CDT

## 2023-09-03 RX ORDER — LEVOTHYROXINE SODIUM 137 UG/1
137 TABLET ORAL
Qty: 90 TABLET | Refills: 3 | Status: SHIPPED | OUTPATIENT
Start: 2023-09-03

## 2023-09-03 NOTE — TELEPHONE ENCOUNTER
Refill Routing Note   Medication(s) are not appropriate for processing by [facility] for the following reason(s):      Medication outside of protocol    [facility] action(s):  Route  Approve Care Due:  None identified            Appointments  past 12m or future 3m with PCP    Date Provider   Last Visit   7/11/2023 Maddi Gastelum DO   Next Visit   1/11/2024 Maddi Gasteulm,    ED visits in past 90 days: 0        Note composed:2:40 AM 09/03/2023

## 2023-09-04 RX ORDER — MELOXICAM 15 MG/1
TABLET ORAL
Qty: 90 TABLET | Refills: 3 | Status: SHIPPED | OUTPATIENT
Start: 2023-09-04

## 2023-09-20 ENCOUNTER — OFFICE VISIT (OUTPATIENT)
Dept: OPTOMETRY | Facility: CLINIC | Age: 78
End: 2023-09-20
Payer: COMMERCIAL

## 2023-09-20 DIAGNOSIS — H52.7 REFRACTIVE ERROR: Primary | ICD-10-CM

## 2023-09-20 DIAGNOSIS — H25.13 NUCLEAR SCLEROSIS OF BOTH EYES: ICD-10-CM

## 2023-09-20 DIAGNOSIS — H40.013 OPEN ANGLE WITH BORDERLINE FINDINGS AND LOW GLAUCOMA RISK IN BOTH EYES: ICD-10-CM

## 2023-09-20 PROCEDURE — 92014 COMPRE OPH EXAM EST PT 1/>: CPT | Mod: S$GLB,,, | Performed by: OPTOMETRIST

## 2023-09-20 PROCEDURE — 92015 PR REFRACTION: ICD-10-PCS | Mod: S$GLB,,, | Performed by: OPTOMETRIST

## 2023-09-20 PROCEDURE — 92014 PR EYE EXAM, EST PATIENT,COMPREHESV: ICD-10-PCS | Mod: S$GLB,,, | Performed by: OPTOMETRIST

## 2023-09-20 PROCEDURE — 92133 CPTRZD OPH DX IMG PST SGM ON: CPT | Mod: S$GLB,,, | Performed by: OPTOMETRIST

## 2023-09-20 PROCEDURE — 92015 DETERMINE REFRACTIVE STATE: CPT | Mod: S$GLB,,, | Performed by: OPTOMETRIST

## 2023-09-20 PROCEDURE — 99999 PR PBB SHADOW E&M-EST. PATIENT-LVL III: ICD-10-PCS | Mod: PBBFAC,,, | Performed by: OPTOMETRIST

## 2023-09-20 PROCEDURE — 99999 PR PBB SHADOW E&M-EST. PATIENT-LVL III: CPT | Mod: PBBFAC,,, | Performed by: OPTOMETRIST

## 2023-09-20 PROCEDURE — 92133 POSTERIOR SEGMENT OCT OPTIC NERVE(OCULAR COHERENCE TOMOGRAPHY) - OU - BOTH EYES: ICD-10-PCS | Mod: S$GLB,,, | Performed by: OPTOMETRIST

## 2023-09-20 NOTE — PROGRESS NOTES
HPI    P there for annual eye exam. Last exam - 06/2022    Pt sts [facility] is stable with glasses. Pt denies flashes/floaters/pain. Pt   uses [employer] when doing yard work.  Last edited by Casi Adames on 9/20/2023  2:37 PM.            Assessment /Plan     For exam results, see Encounter Report.    Refractive error    Nuclear sclerosis of both eyes    Open angle with borderline findings and low glaucoma risk in both eyes      [employer] Rx given, discussed different options for glasses. RTC 1 year routine eye exam.   2. Educated pt on presence of cataracts and effects on vision. No surgery at this time. Recheck in one year.   3. Low risk based on CD, iop normal, no fam history OCT of rnfl normal, RTC yearly.

## 2023-10-13 ENCOUNTER — OFFICE VISIT (OUTPATIENT)
Dept: PODIATRY | Facility: CLINIC | Age: 78
End: 2023-10-13
Payer: MEDICARE

## 2023-10-13 VITALS — HEIGHT: 71 IN | WEIGHT: 173 LBS | BODY MASS INDEX: 24.22 KG/M2

## 2023-10-13 DIAGNOSIS — M62.462 GASTROCNEMIUS EQUINUS, LEFT: ICD-10-CM

## 2023-10-13 DIAGNOSIS — M76.62 ACHILLES TENDINITIS OF LEFT LOWER EXTREMITY: Primary | ICD-10-CM

## 2023-10-13 DIAGNOSIS — M89.8X7 RETROCALCANEAL EXOSTOSIS: ICD-10-CM

## 2023-10-13 DIAGNOSIS — M77.52 POSTCALCANEAL BURSITIS OF LEFT FOOT: ICD-10-CM

## 2023-10-13 PROCEDURE — 1101F PR PT FALLS ASSESS DOC 0-1 FALLS W/OUT INJ PAST YR: ICD-10-PCS | Mod: CPTII,S$GLB,, | Performed by: PODIATRIST

## 2023-10-13 PROCEDURE — 99999 PR PBB SHADOW E&M-EST. PATIENT-LVL III: ICD-10-PCS | Mod: PBBFAC,,, | Performed by: PODIATRIST

## 2023-10-13 PROCEDURE — 99203 OFFICE O/P NEW LOW 30 MIN: CPT | Mod: S$GLB,,, | Performed by: PODIATRIST

## 2023-10-13 PROCEDURE — 1125F PR PAIN SEVERITY QUANTIFIED, PAIN PRESENT: ICD-10-PCS | Mod: CPTII,S$GLB,, | Performed by: PODIATRIST

## 2023-10-13 PROCEDURE — 1125F AMNT PAIN NOTED PAIN PRSNT: CPT | Mod: CPTII,S$GLB,, | Performed by: PODIATRIST

## 2023-10-13 PROCEDURE — 99203 PR OFFICE/OUTPT VISIT, NEW, LEVL III, 30-44 MIN: ICD-10-PCS | Mod: S$GLB,,, | Performed by: PODIATRIST

## 2023-10-13 PROCEDURE — 99999 PR PBB SHADOW E&M-EST. PATIENT-LVL III: CPT | Mod: PBBFAC,,, | Performed by: PODIATRIST

## 2023-10-13 PROCEDURE — 3288F FALL RISK ASSESSMENT DOCD: CPT | Mod: CPTII,S$GLB,, | Performed by: PODIATRIST

## 2023-10-13 PROCEDURE — 3288F PR FALLS RISK ASSESSMENT DOCUMENTED: ICD-10-PCS | Mod: CPTII,S$GLB,, | Performed by: PODIATRIST

## 2023-10-13 PROCEDURE — 1101F PT FALLS ASSESS-DOCD LE1/YR: CPT | Mod: CPTII,S$GLB,, | Performed by: PODIATRIST

## 2023-10-13 RX ORDER — METHYLPREDNISOLONE 4 MG/1
TABLET ORAL
Qty: 1 EACH | Refills: 0 | Status: SHIPPED | OUTPATIENT
Start: 2023-10-13

## 2023-10-13 NOTE — PROGRESS NOTES
Subjective:     Patient ID: Eric Buitrago is a 78 y.o. male.    Chief Complaint: Heel Pain (Left heel pain / planter fasciitis)    Eric is a 78 y.o. male with a past medical history of Allergy, Hyperlipidemia, Hypertension, Hypothyroid, and IBS (irritable bowel syndrome). The patient's chief complaint consists of Lt. Posterior heel pain that has been present roughly since .  Notes this occurred shortly after taking a trip with the use of boots throughout.  Currently relates sharp pain from the site.  Rates pain as a 2/10.  Symptoms are aggravated with all weight bearing.  Notes being prescribed voltaren by his PCP with minimal improvement noted with topical application.  States this is now beginning to impede activities of daily living.  Denies sustaining definitive trauma to the site.  Denies any additional pedal complaints.     Past Medical History:   Diagnosis Date    Allergy     Hyperlipidemia     Hypertension     Hypothyroid     IBS (irritable bowel syndrome)        Past Surgical History:   Procedure Laterality Date    CHOLECYSTECTOMY      COLONOSCOPY  ~    RABITO.    COLONOSCOPY N/A 2018    Procedure: COLONOSCOPY;  Surgeon: Josue Myles Jr., MD;  Location: [facility];  Service: Endoscopy;  Laterality: N/A;       Family History   Problem Relation Age of Onset    Lymphoma Mother     Stroke Father     Breast cancer Sister     Leukemia Brother     Glaucoma Neg Hx     Macular degeneration Neg Hx     Retinal detachment Neg Hx        Social History     Socioeconomic History    Marital status:    Occupational History    Occupation: retired school psycholgist   Tobacco Use    Smoking status: Former     Current packs/day: 0.00     Average packs/day: 0.3 packs/day for 10.0 years (2.5 ttl pk-yrs)     Types: Cigarettes     Start date: 1973     Quit date: 1983     Years since quittin.2    Smokeless tobacco: Never   Substance and Sexual Activity    Alcohol use: Yes     Comment:  occ    Drug use: No    Sexual activity: Not Currently     Social Determinants of Health     Financial Resource Strain: Low Risk  (11/4/2021)    Overall Financial Resource Strain (CARDIA)     Difficulty of Paying Living Expenses: Not hard at all   Food Insecurity: No Food Insecurity (11/4/2021)    Hunger Vital Sign     Worried About Running Out of Food in the Last Year: Never true     Ran Out of Food in the Last Year: Never true   Transportation Needs: No Transportation Needs (11/4/2021)    PRAPARE - Transportation     Lack of Transportation (Medical): No     Lack of Transportation (Non-Medical): No   Physical Activity: Sufficiently Active (11/4/2021)    Exercise Vital Sign     Days of Exercise per Week: 5 days     Minutes of Exercise per Session: 40 min   Stress: No Stress Concern Present (11/4/2021)    [place] [employer] - Occupational Stress Questionnaire     Feeling of Stress : Only a little   Social Connections: Moderately Integrated (11/4/2021)    Social Connection and Isolation Panel [NHANES]     Frequency of Communication with Friends and Family: More than three times a week     Frequency of Social Gatherings with Friends and Family: Twice a week     Attends Latter-day Services: More than 4 times per year     Active Member of Clubs or Organizations: No     Attends Club or Organization Meetings: Never     Marital Status:    Housing Stability: Unknown (10/8/2020)    Housing Stability Vital Sign     Unable to Pay for Housing in the Last Year: No     Unstable Housing in the Last Year: No       Current Outpatient Medications   Medication Sig Dispense Refill    amLODIPine (NORVASC) 5 MG tablet 1 pills in am and 1/2 pill in early evening 90 tablet 1    amoxicillin-clavulanate 875-125mg (AUGMENTIN) 875-125 mg per tablet Take 1 tablet by mouth 2 (two) times daily. (Patient not taking: Reported on 7/11/2023) 20 tablet 0    azelastine (ASTELIN) 137 mcg (0.1 %) nasal spray USE 1 SPRAY NASALLY  TWICE DAILY 90 mL 3    diclofenac sodium (VOLTAREN) 1 % Gel Apply 2 g topically 2 (two) times daily. 100 g 4    doxepin (SINEQUAN) 50 MG capsule TAKE TWO CAPSULES BY MOUTH ONCE IN THE EVENING. 180 capsule 2    fexofenadine (ALLEGRA) 60 MG tablet Take 1 tablet by mouth.       gabapentin (NEURONTIN) 300 MG capsule Take 1 capsule (300 mg total) by mouth every evening. 90 capsule 2    levothyroxine (SYNTHROID) 137 MCG Tab tablet TAKE 1 TABLET(137 MCG) BY MOUTH BEFORE BREAKFAST 90 tablet 3    losartan (COZAAR) 50 MG tablet TAKE 1 TABLET(50 MG) BY MOUTH EVERY DAY 90 tablet 3    meloxicam (MOBIC) 15 MG tablet TAKE 1 TABLET(15 MG) BY MOUTH EVERY DAY 90 tablet 3    methylPREDNISolone (MEDROL DOSEPACK) 4 mg tablet use as directed 1 each 0    rosuvastatin (CRESTOR) 5 MG tablet TAKE 1 TABLET(5 MG) BY MOUTH EVERY DAY 90 tablet 0    tamsulosin (FLOMAX) 0.4 mg Cap TAKE 1 CAPSULE BY MOUTH EVERY EVENING 90 capsule 3    temazepam (RESTORIL) 15 mg Cap Take 1 capsule (15 mg total) by mouth every night at bedtime. 90 capsule 1     No current facility-administered medications for this visit.       Review of patient's allergies indicates:   Allergen Reactions    No known drug allergies         Hemoglobin A1C   Date Value Ref Range Status   12/14/2017 4.5 4.0 - 5.6 % Final     Comment:     According to ADA guidelines, hemoglobin A1c <7.0% represents  optimal control in non-pregnant diabetic patients. Different  metrics may apply to specific patient populations.   Standards of Medical Care in Diabetes-2016.  For the purpose of screening for the presence of diabetes:  <5.7%     Consistent with the absence of diabetes  5.7-6.4%  Consistent with increasing risk for diabetes   (prediabetes)  >or=6.5%  Consistent with diabetes  Currently, no consensus exists for use of hemoglobin A1c  for diagnosis of diabetes for children.  This Hemoglobin A1c assay has significant interference with fetal   hemoglobin   (HbF). The results are invalid for  patients with abnormal amounts of   HbF,   including those with known Hereditary Persistence   of Fetal Hemoglobin. Heterozygous hemoglobin variants (HbAS, HbAC,   HbAD, HbAE, HbA2) do not significantly interfere with this assay;   however, presence of multiple variants in a sample may impact the %   interference.     02/14/2011 4.7 4.0 - 6.2 % Final   01/08/2009 4.7 4.0 - 6.2 % Final       Review of Systems   Constitutional: Negative for chills and fever.   Cardiovascular:  Negative for claudication and leg swelling.   Skin:  Negative for color change and nail changes.   Musculoskeletal:  Positive for myalgias. Negative for joint pain, joint swelling, muscle cramps and muscle weakness.   Gastrointestinal:  Negative for nausea and vomiting.   Neurological:  Negative for numbness and paresthesias.   Psychiatric/Behavioral:  Negative for altered mental status.         Objective:     Physical Exam  Constitutional:       Appearance: Normal appearance. He is not ill-appearing.   Cardiovascular:      Pulses:           Dorsalis pedis pulses are 2+ on the right side and 2+ on the left side.        Posterior tibial pulses are 2+ on the right side and 2+ on the left side.      Comments: CFT is < 3 seconds bilateral.  Pedal hair growth is present bilateral.  No lower extremity edema noted bilateral.  Toes are warm to touch bilateral.    Musculoskeletal:         General: Tenderness present. No deformity or signs of injury.      Right lower leg: No edema.      Left lower leg: No edema.      Comments: Muscle strength 5/5 in all muscle groups bilateral.  No tenderness nor crepitation with ROM of foot/ankle joints bilateral.  Pain with palpation to the bursa overlying the Lt. Retrocalcaneal exostosis.  Lt. Sided gastrocnemius equinus.  Pain with palpation to the Lt. Achilles 1cm proximal to its insertion.  (-) dell or defect noted to said tendon.     Skin:     General: Skin is warm and dry.      Capillary Refill: Capillary refill  takes 2 to 3 seconds.      Findings: No bruising, ecchymosis, erythema, signs of injury, laceration, lesion, petechiae, rash or wound.      Comments: Pedal skin has normal turgor, temperature, and texture bilateral.  Toenails x 10 appear normotrophic. Examination of the skin reveals no evidence of significant maceration, rashes, open lesions, suspicious appearing nevi or other concerning lesions.       Neurological:      General: No focal deficit present.      Mental Status: He is alert.      Sensory: No sensory deficit.      Motor: No weakness or atrophy.      Comments: Light touch is intact bilateral.             Assessment:      Encounter Diagnoses   Name Primary?    Achilles tendinitis of left lower extremity Yes    Postcalcaneal bursitis of left foot     Gastrocnemius equinus, left     Retrocalcaneal exostosis      Plan:     Eric was seen today for heel pain.    Diagnoses and all orders for this visit:    Achilles tendinitis of left lower extremity  -     Ambulatory referral/consult to Physical/Occupational Therapy; Future  -     methylPREDNISolone (MEDROL DOSEPACK) 4 mg tablet; use as directed    Postcalcaneal bursitis of left foot  -     Ambulatory referral/consult to Physical/Occupational Therapy; Future  -     methylPREDNISolone (MEDROL DOSEPACK) 4 mg tablet; use as directed    Gastrocnemius equinus, left    Retrocalcaneal exostosis      I counseled the patient on his conditions, their implications and medical management.       - Discussed performing stretching exercises to address bilateral equinus.     - Recommend wearing supportive shoes only.  Discussed avoidance of barefoot walking, flip flops, and Crocs, as this will exacerbate current symptoms.       - Recommend wearing a heel lift on the Lt. Side with all ambulation.       - Recommend icing the affected heel a minimum of 20 minutes daily.    - Discussed avoidance of high impact activities such as squatting, stooping, and running as these activities  will exacerbate symptoms.       - May consider applying a topical analgesic (Voltaren cream) to help with pain symptoms.      - Referral written for PT for astym and dry needling.     - RTC prn or sooner if symptoms fail to resolve within 6 weeks.  Will consider offloading in a boot and/or a steroid injection should aforementioned therapies fail to resolve his symptoms.       Mikhail Mcmullen DPM

## 2023-10-13 NOTE — PATIENT INSTRUCTIONS
- Perform stretching exercises, see handout for details, to address tightness of calf muscles.      - Recommend wearing supportive shoes only.  Avoid barefoot walking, flip flops, and Crocs, as this will exacerbate current symptoms.      - Mal (Jorge Tyler, and Neetu)    - Wear the heel lift in the Lt. Shoe daily.    - Recommend icing the affected heel a minimum of 20 minutes daily.    - Avoid high impact activities such as squatting, stooping, and running as these activities will exacerbate symptoms.      - May consider applying a topical analgesic (Voltaren cream) to help with pain symptoms.

## 2023-11-13 NOTE — TELEPHONE ENCOUNTER
No care due was identified.  [facility] Embedded Care Due Messages. Reference number: [id number].   11/13/2023 10:20:43 AM CST

## 2023-11-13 NOTE — TELEPHONE ENCOUNTER
"Refill Routing Note   Medication(s) are not appropriate for processing by [facility] for the following reason(s):      No active prescription written by provider    [facility] action(s):  Defer Care Due:  None identified     Medication Therapy Plan: No active prescription, previously set to "no print." Reset to "normal" and pended for your review.        Appointments  past 12m or future 3m with PCP    Date Provider   Last Visit   7/11/2023 Maddi Gastelum, DO   Next Visit   1/11/2024 Maddi Gastelum, DO   ED visits in past 90 days: 0        Note composed:5:14 PM 11/13/2023          "

## 2023-11-14 RX ORDER — AMLODIPINE BESYLATE 5 MG/1
TABLET ORAL
Qty: 135 TABLET | Refills: 3 | Status: SHIPPED | OUTPATIENT
Start: 2023-11-14

## 2023-11-27 ENCOUNTER — OFFICE VISIT (OUTPATIENT)
Dept: PODIATRY | Facility: CLINIC | Age: 78
End: 2023-11-27
Payer: MEDICARE

## 2023-11-27 VITALS — HEIGHT: 71 IN | WEIGHT: 173.06 LBS | BODY MASS INDEX: 24.23 KG/M2

## 2023-11-27 DIAGNOSIS — M77.52 POSTCALCANEAL BURSITIS OF LEFT FOOT: Primary | ICD-10-CM

## 2023-11-27 DIAGNOSIS — M89.8X7 RETROCALCANEAL EXOSTOSIS: ICD-10-CM

## 2023-11-27 DIAGNOSIS — M76.62 ACHILLES TENDINITIS OF LEFT LOWER EXTREMITY: ICD-10-CM

## 2023-11-27 DIAGNOSIS — M62.462 GASTROCNEMIUS EQUINUS, LEFT: ICD-10-CM

## 2023-11-27 PROCEDURE — 20600 PR DRAIN/INJECT SMALL JOINT/BURSA: ICD-10-PCS | Mod: LT,S$GLB,, | Performed by: PODIATRIST

## 2023-11-27 PROCEDURE — 20600 DRAIN/INJ JOINT/BURSA W/O US: CPT | Mod: LT,S$GLB,, | Performed by: PODIATRIST

## 2023-11-27 PROCEDURE — 99999 PR PBB SHADOW E&M-EST. PATIENT-LVL III: ICD-10-PCS | Mod: PBBFAC,,, | Performed by: PODIATRIST

## 2023-11-27 PROCEDURE — 99499 UNLISTED E&M SERVICE: CPT | Mod: S$GLB,,, | Performed by: PODIATRIST

## 2023-11-27 PROCEDURE — 99999 PR PBB SHADOW E&M-EST. PATIENT-LVL III: CPT | Mod: PBBFAC,,, | Performed by: PODIATRIST

## 2023-11-27 PROCEDURE — 99499 NO LOS: ICD-10-PCS | Mod: S$GLB,,, | Performed by: PODIATRIST

## 2023-11-27 RX ADMIN — LIDOCAINE HYDROCHLORIDE 1 ML: 10 INJECTION INFILTRATION; PERINEURAL at 05:11

## 2023-11-27 RX ADMIN — TRIAMCINOLONE ACETONIDE 20 MG: 40 INJECTION, SUSPENSION INTRA-ARTICULAR; INTRAMUSCULAR at 05:11

## 2023-11-27 RX ADMIN — DEXAMETHASONE SODIUM PHOSPHATE 2 MG: 4 INJECTION, SOLUTION INTRA-ARTICULAR; INTRALESIONAL; INTRAMUSCULAR; INTRAVENOUS; SOFT TISSUE at 05:11

## 2023-11-29 RX ORDER — TRIAMCINOLONE ACETONIDE 40 MG/ML
20 INJECTION, SUSPENSION INTRA-ARTICULAR; INTRAMUSCULAR ONCE
Status: COMPLETED | OUTPATIENT
Start: 2023-11-27 | End: 2023-11-27

## 2023-11-29 RX ORDER — DEXAMETHASONE SODIUM PHOSPHATE 4 MG/ML
2 INJECTION, SOLUTION INTRA-ARTICULAR; INTRALESIONAL; INTRAMUSCULAR; INTRAVENOUS; SOFT TISSUE
Status: COMPLETED | OUTPATIENT
Start: 2023-11-27 | End: 2023-11-27

## 2023-11-29 RX ORDER — LIDOCAINE HYDROCHLORIDE 10 MG/ML
1 INJECTION INFILTRATION; PERINEURAL
Status: COMPLETED | OUTPATIENT
Start: 2023-11-27 | End: 2023-11-27

## 2023-11-29 NOTE — PROGRESS NOTES
Subjective:     Patient ID: Eric Buitrago is a 78 y.o. male.    Chief Complaint: Follow-up  Patient presents to clinic for roughly a 6 week follow up regarding Lt. Sided achilles tendonitis and retrocalcaneal bursitis.  He continues to note pain in the heel with today's exam.  Rates pain as a 0/10 while at rest.  Continues to have pain with ambulation.  Notes attending PT, however, feels this has done little to mitigate pain symptoms.  Continues stretching and wearing supportive shoe gear as instructed.  Inquires as to additional treatment options   Denies any additional pedal complaints.     Past Medical History:   Diagnosis Date    Allergy     Hyperlipidemia     Hypertension     Hypothyroid     IBS (irritable bowel syndrome)        Past Surgical History:   Procedure Laterality Date    CHOLECYSTECTOMY      COLONOSCOPY  ~    RABITO.    COLONOSCOPY N/A 2018    Procedure: COLONOSCOPY;  Surgeon: Josue Myles Jr., MD;  Location: [facility] END[facility];  Service: Endoscopy;  Laterality: N/A;       Family History   Problem Relation Age of Onset    Lymphoma Mother     Stroke Father     Breast cancer Sister     Leukemia Brother     Glaucoma Neg Hx     Macular degeneration Neg Hx     Retinal detachment Neg Hx        Social History     Socioeconomic History    Marital status:    Occupational History    Occupation: retired school psycholgist   Tobacco Use    Smoking status: Former     Current packs/day: 0.00     Average packs/day: 0.3 packs/day for 10.0 years (2.5 ttl pk-yrs)     Types: Cigarettes     Start date: 1973     Quit date: 1983     Years since quittin.4    Smokeless tobacco: Never   Substance and Sexual Activity    Alcohol use: Yes     Comment: occ    Drug use: No    Sexual activity: Not Currently     Social Determinants of Health     Financial Resource Strain: Low Risk  (2021)    Overall Financial Resource Strain (CARDIA)     Difficulty of Paying Living Expenses: Not hard at all    Food Insecurity: No Food Insecurity (11/4/2021)    Hunger Vital Sign     Worried About Running Out of Food in the Last Year: Never true     Ran Out of Food in the Last Year: Never true   Transportation Needs: No Transportation Needs (11/4/2021)    PRAPARE - Transportation     Lack of Transportation (Medical): No     Lack of Transportation (Non-Medical): No   Physical Activity: Sufficiently Active (11/4/2021)    Exercise Vital Sign     Days of Exercise per Week: 5 days     Minutes of Exercise per Session: 40 min   Stress: No Stress Concern Present (11/4/2021)    [place] [employer]ational [employer] - Occupational Stress Questionnaire     Feeling of Stress : Only a little   Social Connections: Moderately Integrated (11/4/2021)    Social Connection and Isolation Panel [NHANES]     Frequency of Communication with Friends and Family: More than three times a week     Frequency of Social Gatherings with Friends and Family: Twice a week     Attends Orthodox Services: More than 4 times per year     Active Member of Clubs or Organizations: No     Attends Club or Organization Meetings: Never     Marital Status:    Housing Stability: Unknown (10/8/2020)    Housing Stability Vital Sign     Unable to Pay for Housing in the Last Year: No     Unstable Housing in the Last Year: No       Current Outpatient Medications   Medication Sig Dispense Refill    amLODIPine (NORVASC) 5 MG tablet Take 1 tablet (5 mg total) by mouth every morning AND 0.5 tablets (2.5 mg total) every evening. 135 tablet 3    amoxicillin-clavulanate 875-125mg (AUGMENTIN) 875-125 mg per tablet Take 1 tablet by mouth 2 (two) times daily. (Patient not taking: Reported on 7/11/2023) 20 tablet 0    azelastine (ASTELIN) 137 mcg (0.1 %) nasal spray USE 1 SPRAY NASALLY TWICE DAILY 90 mL 3    diclofenac sodium (VOLTAREN) 1 % Gel Apply 2 g topically 2 (two) times daily. 100 g 4    doxepin (SINEQUAN) 50 MG capsule TAKE TWO CAPSULES BY MOUTH ONCE IN THE EVENING.  180 capsule 2    fexofenadine (ALLEGRA) 60 MG tablet Take 1 tablet by mouth.       gabapentin (NEURONTIN) 300 MG capsule Take 1 capsule (300 mg total) by mouth every evening. 90 capsule 2    levothyroxine (SYNTHROID) 137 MCG Tab tablet TAKE 1 TABLET(137 MCG) BY MOUTH BEFORE BREAKFAST 90 tablet 3    losartan (COZAAR) 50 MG tablet TAKE 1 TABLET(50 MG) BY MOUTH EVERY DAY 90 tablet 3    meloxicam (MOBIC) 15 MG tablet TAKE 1 TABLET(15 MG) BY MOUTH EVERY DAY 90 tablet 3    methylPREDNISolone (MEDROL DOSEPACK) 4 mg tablet use as directed 1 each 0    rosuvastatin (CRESTOR) 5 MG tablet TAKE 1 TABLET(5 MG) BY MOUTH EVERY DAY 90 tablet 0    tamsulosin (FLOMAX) 0.4 mg Cap TAKE 1 CAPSULE BY MOUTH EVERY EVENING 90 capsule 3    temazepam (RESTORIL) 15 mg Cap Take 1 capsule (15 mg total) by mouth every night at bedtime. 90 capsule 1     No current facility-administered medications for this visit.       Review of patient's allergies indicates:   Allergen Reactions    No known drug allergies         Hemoglobin A1C   Date Value Ref Range Status   12/14/2017 4.5 4.0 - 5.6 % Final     Comment:     According to ADA guidelines, hemoglobin A1c <7.0% represents  optimal control in non-pregnant diabetic patients. Different  metrics may apply to specific patient populations.   Standards of Medical Care in Diabetes-2016.  For the purpose of screening for the presence of diabetes:  <5.7%     Consistent with the absence of diabetes  5.7-6.4%  Consistent with increasing risk for diabetes   (prediabetes)  >or=6.5%  Consistent with diabetes  Currently, no consensus exists for use of hemoglobin A1c  for diagnosis of diabetes for children.  This Hemoglobin A1c assay has significant interference with fetal   hemoglobin   (HbF). The results are invalid for patients with abnormal amounts of   HbF,   including those with known Hereditary Persistence   of Fetal Hemoglobin. Heterozygous hemoglobin variants (HbAS, HbAC,   HbAD, HbAE, HbA2) do not  significantly interfere with this assay;   however, presence of multiple variants in a sample may impact the %   interference.     02/14/2011 4.7 4.0 - 6.2 % Final   01/08/2009 4.7 4.0 - 6.2 % Final       Review of Systems   Constitutional: Negative for chills and fever.   Cardiovascular:  Negative for claudication and leg swelling.   Skin:  Negative for color change and nail changes.   Musculoskeletal:  Positive for myalgias. Negative for joint pain, joint swelling, muscle cramps and muscle weakness.   Gastrointestinal:  Negative for nausea and vomiting.   Neurological:  Negative for numbness and paresthesias.   Psychiatric/Behavioral:  Negative for altered mental status.         Objective:     Physical Exam  Constitutional:       Appearance: Normal appearance. He is not ill-appearing.   Cardiovascular:      Pulses:           Dorsalis pedis pulses are 2+ on the right side and 2+ on the left side.        Posterior tibial pulses are 2+ on the right side and 2+ on the left side.      Comments: CFT is < 3 seconds bilateral.  Pedal hair growth is present bilateral.  No lower extremity edema noted bilateral.  Toes are warm to touch bilateral.    Musculoskeletal:         General: Tenderness present. No deformity or signs of injury.      Right lower leg: No edema.      Left lower leg: No edema.      Comments: Muscle strength 5/5 in all muscle groups bilateral.  No tenderness nor crepitation with ROM of foot/ankle joints bilateral.  Pain with palpation to the bursa overlying the Lt. Retrocalcaneal exostosis.  Lt. Sided gastrocnemius equinus with slight improvement in dorsiflexion.  Less pain with palpation to the Lt. Achilles 1cm proximal to its insertion.  (-) dell or defect noted to said tendon.     Skin:     General: Skin is warm and dry.      Capillary Refill: Capillary refill takes 2 to 3 seconds.      Findings: No bruising, ecchymosis, erythema, signs of injury, laceration, lesion, petechiae, rash or wound.       Comments: Pedal skin has normal turgor, temperature, and texture bilateral.  Toenails x 10 appear normotrophic. Examination of the skin reveals no evidence of significant maceration, rashes, open lesions, suspicious appearing nevi or other concerning lesions.       Neurological:      General: No focal deficit present.      Mental Status: He is alert.      Sensory: No sensory deficit.      Motor: No weakness or atrophy.      Comments: Light touch is intact bilateral.             Assessment:      Encounter Diagnoses   Name Primary?    Postcalcaneal bursitis of left foot Yes    Achilles tendinitis of left lower extremity     Gastrocnemius equinus, left     Retrocalcaneal exostosis      Plan:     Eric was seen today for follow-up.    Diagnoses and all orders for this visit:    Postcalcaneal bursitis of left foot  -     LIDOcaine HCL 10 mg/ml (1%) injection 1 mL  -     dexAMETHasone injection 2 mg  -     triamcinolone acetonide injection 20 mg    Achilles tendinitis of left lower extremity    Gastrocnemius equinus, left    Retrocalcaneal exostosis      I counseled the patient on his conditions, their implications and medical management.    - Based on today's exam, retrocalcaneal bursitis remains active.  Discussed offloading in a boot x 2 weeks to rest the affected area.  Also, discussed a bursal injection, to which he was amenable.  Prior to the injection, I did highlight potential risk factors including Achilles tendon weakening/rupture to which he expressed understanding.     - After sterilizing the area with an alcohol prep and applying ethyl chloride, the affected area of the Lt. Retrocalcaneal bursa was injected as per MAR.  The patient tolerated the injection well, with minimal blood loss noted from the injection site.  The injection site was then covered with a bandage.  Patient tolerated this quite well.        - To continue performing stretching exercises to address bilateral equinus.     - Recommend wearing  supportive shoes only.  Discussed avoidance of barefoot walking, flip flops, and Crocs, as this will exacerbate current symptoms.       - Fitted and dispensed the postop boot, which is to be worn at all times while weight bearing x 2 weeks.     - Discussed avoidance of high impact activities such as squatting, stooping, and running as these activities will exacerbate symptoms.       - May consider applying a topical analgesic (Voltaren cream) to help with pain symptoms.      - RTC if symptoms fail to resolve in the next 6 weeks.     Mikhail Mcmullen DPM

## 2023-12-11 ENCOUNTER — OFFICE VISIT (OUTPATIENT)
Dept: PODIATRY | Facility: CLINIC | Age: 78
End: 2023-12-11
Payer: MEDICARE

## 2023-12-11 VITALS — HEIGHT: 71 IN | BODY MASS INDEX: 24.23 KG/M2 | WEIGHT: 173.06 LBS

## 2023-12-11 DIAGNOSIS — M89.8X7 RETROCALCANEAL EXOSTOSIS: ICD-10-CM

## 2023-12-11 DIAGNOSIS — M62.462 GASTROCNEMIUS EQUINUS, LEFT: ICD-10-CM

## 2023-12-11 DIAGNOSIS — M77.52 POSTCALCANEAL BURSITIS OF LEFT FOOT: Primary | ICD-10-CM

## 2023-12-11 DIAGNOSIS — M76.62 ACHILLES TENDINITIS OF LEFT LOWER EXTREMITY: ICD-10-CM

## 2023-12-11 PROCEDURE — 99212 PR OFFICE/OUTPT VISIT, EST, LEVL II, 10-19 MIN: ICD-10-PCS | Mod: S$GLB,,, | Performed by: PODIATRIST

## 2023-12-11 PROCEDURE — 99212 OFFICE O/P EST SF 10 MIN: CPT | Mod: S$GLB,,, | Performed by: PODIATRIST

## 2023-12-11 PROCEDURE — 1159F MED LIST DOCD IN RCRD: CPT | Mod: CPTII,S$GLB,, | Performed by: PODIATRIST

## 2023-12-11 PROCEDURE — 99999 PR PBB SHADOW E&M-EST. PATIENT-LVL II: CPT | Mod: PBBFAC,,, | Performed by: PODIATRIST

## 2023-12-11 PROCEDURE — 3288F FALL RISK ASSESSMENT DOCD: CPT | Mod: CPTII,S$GLB,, | Performed by: PODIATRIST

## 2023-12-11 PROCEDURE — 1101F PR PT FALLS ASSESS DOC 0-1 FALLS W/OUT INJ PAST YR: ICD-10-PCS | Mod: CPTII,S$GLB,, | Performed by: PODIATRIST

## 2023-12-11 PROCEDURE — 1159F PR MEDICATION LIST DOCUMENTED IN MEDICAL RECORD: ICD-10-PCS | Mod: CPTII,S$GLB,, | Performed by: PODIATRIST

## 2023-12-11 PROCEDURE — 3288F PR FALLS RISK ASSESSMENT DOCUMENTED: ICD-10-PCS | Mod: CPTII,S$GLB,, | Performed by: PODIATRIST

## 2023-12-11 PROCEDURE — 1126F PR PAIN SEVERITY QUANTIFIED, NO PAIN PRESENT: ICD-10-PCS | Mod: CPTII,S$GLB,, | Performed by: PODIATRIST

## 2023-12-11 PROCEDURE — 99999 PR PBB SHADOW E&M-EST. PATIENT-LVL II: ICD-10-PCS | Mod: PBBFAC,,, | Performed by: PODIATRIST

## 2023-12-11 PROCEDURE — 1101F PT FALLS ASSESS-DOCD LE1/YR: CPT | Mod: CPTII,S$GLB,, | Performed by: PODIATRIST

## 2023-12-11 PROCEDURE — 1126F AMNT PAIN NOTED NONE PRSNT: CPT | Mod: CPTII,S$GLB,, | Performed by: PODIATRIST

## 2023-12-12 NOTE — PROGRESS NOTES
Subjective:     Patient ID: Eric Buitrago is a 78 y.o. male.    Chief Complaint: Heel Pain  Patient presents to clinic 2 weeks following a steroid injection of the Lt. Retrocalcaneal bursa and offloading in the surgical boot.  Denies experiencing pain from the affected limb with today's exam.  Notes being able to ambulate comfortably since our last exam.  Inquires as to how we proceed going forward.  Denies any additional pedal complaints.     Past Medical History:   Diagnosis Date    Allergy     Hyperlipidemia     Hypertension     Hypothyroid     IBS (irritable bowel syndrome)        Past Surgical History:   Procedure Laterality Date    CHOLECYSTECTOMY      COLONOSCOPY  ~    RABITO.    COLONOSCOPY N/A 2018    Procedure: COLONOSCOPY;  Surgeon: Josue Myles Jr., MD;  Location: [facility] ENDO;  Service: Endoscopy;  Laterality: N/A;       Family History   Problem Relation Age of Onset    Lymphoma Mother     Stroke Father     Breast cancer Sister     Leukemia Brother     Glaucoma Neg Hx     Macular degeneration Neg Hx     Retinal detachment Neg Hx        Social History     Socioeconomic History    Marital status:    Occupational History    Occupation: retired school psycholgist   Tobacco Use    Smoking status: Former     Current packs/day: 0.00     Average packs/day: 0.3 packs/day for 10.0 years (2.5 ttl pk-yrs)     Types: Cigarettes     Start date: 1973     Quit date: 1983     Years since quittin.4    Smokeless tobacco: Never   Substance and Sexual Activity    Alcohol use: Yes     Comment: occ    Drug use: No    Sexual activity: Not Currently     Social Determinants of Health     Financial Resource Strain: Low Risk  (2021)    Overall Financial Resource Strain (CARDIA)     Difficulty of Paying Living Expenses: Not hard at all   Food Insecurity: No Food Insecurity (2021)    Hunger Vital Sign     Worried About Running Out of Food in the Last Year: Never true     Ran Out of  Food in the Last Year: Never true   Transportation Needs: No Transportation Needs (11/4/2021)    PRAPARE - Transportation     Lack of Transportation (Medical): No     Lack of Transportation (Non-Medical): No   Physical Activity: Sufficiently Active (11/4/2021)    Exercise Vital Sign     Days of Exercise per Week: 5 days     Minutes of Exercise per Session: 40 min   Stress: No Stress Concern Present (11/4/2021)    Citizen of [place] of Occupational Health - Occupational Stress Questionnaire     Feeling of Stress : Only a little   Social Connections: Moderately Integrated (11/4/2021)    Social Connection and Isolation Panel [NHANES]     Frequency of Communication with Friends and Family: More than three times a week     Frequency of Social Gatherings with Friends and Family: Twice a week     Attends Anabaptism Services: More than 4 times per year     Active Member of Clubs or Organizations: No     Attends Club or Organization Meetings: Never     Marital Status:    Housing Stability: Unknown (10/8/2020)    Housing Stability Vital Sign     Unable to Pay for Housing in the Last Year: No     Unstable Housing in the Last Year: No       Current Outpatient Medications   Medication Sig Dispense Refill    amLODIPine (NORVASC) 5 MG tablet Take 1 tablet (5 mg total) by mouth every morning AND 0.5 tablets (2.5 mg total) every evening. 135 tablet 3    amoxicillin-clavulanate 875-125mg (AUGMENTIN) 875-125 mg per tablet Take 1 tablet by mouth 2 (two) times daily. (Patient not taking: Reported on 7/11/2023) 20 tablet 0    azelastine (ASTELIN) 137 mcg (0.1 %) nasal spray USE 1 SPRAY NASALLY TWICE DAILY 90 mL 3    diclofenac sodium (VOLTAREN) 1 % Gel Apply 2 g topically 2 (two) times daily. 100 g 4    doxepin (SINEQUAN) 50 MG capsule TAKE TWO CAPSULES BY MOUTH ONCE IN THE EVENING. 180 capsule 2    fexofenadine (ALLEGRA) 60 MG tablet Take 1 tablet by mouth.       gabapentin (NEURONTIN) 300 MG capsule Take 1 capsule (300 mg total)  by mouth every evening. 90 capsule 2    levothyroxine (SYNTHROID) 137 MCG Tab tablet TAKE 1 TABLET(137 MCG) BY MOUTH BEFORE BREAKFAST 90 tablet 3    losartan (COZAAR) 50 MG tablet TAKE 1 TABLET(50 MG) BY MOUTH EVERY DAY 90 tablet 3    meloxicam (MOBIC) 15 MG tablet TAKE 1 TABLET(15 MG) BY MOUTH EVERY DAY 90 tablet 3    methylPREDNISolone (MEDROL DOSEPACK) 4 mg tablet use as directed 1 each 0    rosuvastatin (CRESTOR) 5 MG tablet TAKE 1 TABLET(5 MG) BY MOUTH EVERY DAY 90 tablet 2    tamsulosin (FLOMAX) 0.4 mg Cap TAKE 1 CAPSULE BY MOUTH EVERY EVENING 90 capsule 3    temazepam (RESTORIL) 15 mg Cap Take 1 capsule (15 mg total) by mouth every night at bedtime. 90 capsule 1     No current facility-administered medications for this visit.       Review of patient's allergies indicates:   Allergen Reactions    No known drug allergies         Hemoglobin A1C   Date Value Ref Range Status   12/14/2017 4.5 4.0 - 5.6 % Final     Comment:     According to ADA guidelines, hemoglobin A1c <7.0% represents  optimal control in non-pregnant diabetic patients. Different  metrics may apply to specific patient populations.   Standards of Medical Care in Diabetes-2016.  For the purpose of screening for the presence of diabetes:  <5.7%     Consistent with the absence of diabetes  5.7-6.4%  Consistent with increasing risk for diabetes   (prediabetes)  >or=6.5%  Consistent with diabetes  Currently, no consensus exists for use of hemoglobin A1c  for diagnosis of diabetes for children.  This Hemoglobin A1c assay has significant interference with fetal   hemoglobin   (HbF). The results are invalid for patients with abnormal amounts of   HbF,   including those with known Hereditary Persistence   of Fetal Hemoglobin. Heterozygous hemoglobin variants (HbAS, HbAC,   HbAD, HbAE, HbA2) do not significantly interfere with this assay;   however, presence of multiple variants in a sample may impact the %   interference.     02/14/2011 4.7 4.0 - 6.2 %  Final   01/08/2009 4.7 4.0 - 6.2 % Final       Review of Systems   Constitutional: Negative for chills and fever.   Cardiovascular:  Negative for claudication and leg swelling.   Skin:  Negative for color change and nail changes.   Musculoskeletal:  Negative for joint pain, joint swelling, muscle cramps, muscle weakness and myalgias.   Gastrointestinal:  Negative for nausea and vomiting.   Neurological:  Negative for numbness and paresthesias.   Psychiatric/Behavioral:  Negative for altered mental status.         Objective:     Physical Exam  Constitutional:       Appearance: Normal appearance. He is not ill-appearing.   Cardiovascular:      Pulses:           Dorsalis pedis pulses are 2+ on the right side and 2+ on the left side.        Posterior tibial pulses are 2+ on the right side and 2+ on the left side.      Comments: CFT is < 3 seconds bilateral.  Pedal hair growth is present bilateral.  No lower extremity edema noted bilateral.  Toes are warm to touch bilateral.    Musculoskeletal:         General: No tenderness, deformity or signs of injury.      Right lower leg: No edema.      Left lower leg: No edema.      Comments: Muscle strength 5/5 in all muscle groups bilateral.  No tenderness nor crepitation with ROM of foot/ankle joints bilateral.  (-) for pain with palpation to the bursa overlying the Lt. Retrocalcaneal exostosis.  Lt. Sided gastrocnemius equinus with significant improvement in dorsiflexion.  (-) for pain with palpation to the Lt. Achilles tendon.  (-) dell or defect noted to said tendon.     Skin:     General: Skin is warm and dry.      Capillary Refill: Capillary refill takes 2 to 3 seconds.      Findings: No bruising, ecchymosis, erythema, signs of injury, laceration, lesion, petechiae, rash or wound.      Comments: Pedal skin has normal turgor, temperature, and texture bilateral.  Toenails x 10 appear normotrophic. Examination of the skin reveals no evidence of significant maceration, rashes,  open lesions, suspicious appearing nevi or other concerning lesions.       Neurological:      General: No focal deficit present.      Mental Status: He is alert.      Sensory: No sensory deficit.      Motor: No weakness or atrophy.      Comments: Light touch is intact bilateral.             Assessment:      Encounter Diagnoses   Name Primary?    Postcalcaneal bursitis of left foot Yes    Achilles tendinitis of left lower extremity     Retrocalcaneal exostosis     Gastrocnemius equinus, left      Plan:     Eric was seen today for heel pain.    Diagnoses and all orders for this visit:    Postcalcaneal bursitis of left foot    Achilles tendinitis of left lower extremity    Retrocalcaneal exostosis    Gastrocnemius equinus, left      I counseled the patient on his conditions, their implications and medical management.    - Based on today's exam, patient is doing exceedingly well.  He is completely asymptomatic since offloading in the boot and following the injection.      - Advised to continue performing stretching exercises to prevent further contracture of the Achilles tendon.    - Patient to transition back into his supportive asic tennis shoes.    - Advised to slowly resume his normal physical activity.  Would continue with avoidance of high impact activities such as squatting, stooping, and running as these activities will exacerbate symptoms.       - RTC prn.     Mikhail Mcmullen DPM

## 2023-12-13 DIAGNOSIS — K58.9 IRRITABLE BOWEL SYNDROME, UNSPECIFIED TYPE: ICD-10-CM

## 2023-12-13 DIAGNOSIS — N40.0 BENIGN NON-NODULAR PROSTATIC HYPERPLASIA WITHOUT LOWER URINARY TRACT SYMPTOMS: ICD-10-CM

## 2023-12-13 RX ORDER — DOXEPIN HYDROCHLORIDE 50 MG/1
CAPSULE ORAL
Qty: 180 CAPSULE | Refills: 1 | Status: SHIPPED | OUTPATIENT
Start: 2023-12-13

## 2023-12-13 RX ORDER — TAMSULOSIN HYDROCHLORIDE 0.4 MG/1
1 CAPSULE ORAL NIGHTLY
Qty: 90 CAPSULE | Refills: 1 | Status: SHIPPED | OUTPATIENT
Start: 2023-12-13 | End: 2024-01-11

## 2023-12-13 NOTE — TELEPHONE ENCOUNTER
No care due was identified.  [facility] Embedded Care Due Messages. Reference number: [id number].   12/13/2023 2:30:19 PM CST

## 2023-12-13 NOTE — TELEPHONE ENCOUNTER
Refill Routing Note   Medication(s) are not appropriate for processing by [facility] for the following reason(s):        Drug-disease interaction:  doxepin and Benign prostatic hyperplasia; Benign non-nodular prostatic hyperplasia without lower urinary tract symptoms     [facility] action(s):  Defer  Approve      Medication Therapy Plan:       Pharmacist review requested: Yes     Appointments  past 12m or future 3m with PCP    Date Provider   Last Visit   7/11/2023 Maddi Gastelum, DO   Next Visit   1/11/2024 Maddi Gastelum, DO   ED visits in past 90 days: 0        Note composed:5:55 PM 12/13/2023

## 2023-12-13 NOTE — TELEPHONE ENCOUNTER
----- Message from Kristie Trejo, Patient Care Assistant sent at 12/13/2023  2:40 PM CST -----  Contact: self  Type:  RX Refill Request    Who Called:   self   Refill or New Rx: refill  RX Name and Strength:doxepin (SINEQUAN) 50 MG capsule     How is the patient currently taking it? (ex. 1XDay):   as directed   Is this a 30 day or 90 day RX:  90  Preferred Pharmacy with phone number:    [facility] [address] [address]  [phone]  Local or Mail Order:   local   Ordering Provider:  Dr Nirav Daniels Call Back Number:   [phone]  Additional Information:  Pt  would like this med to go this pharm Thanks

## 2023-12-14 DIAGNOSIS — F51.01 PRIMARY INSOMNIA: ICD-10-CM

## 2023-12-14 RX ORDER — TEMAZEPAM 15 MG/1
15 CAPSULE ORAL NIGHTLY
Qty: 90 CAPSULE | Refills: 1 | Status: CANCELLED | OUTPATIENT
Start: 2023-12-14

## 2023-12-14 NOTE — TELEPHONE ENCOUNTER
No care due was identified.  [facility] Embedded Care Due Messages. Reference number: [id number].   12/14/2023 3:47:06 PM CST

## 2023-12-14 NOTE — TELEPHONE ENCOUNTER
Refill Decision Note   Eric Iftikhar  is requesting a refill authorization.  Brief Assessment and Rationale for Refill:  Approve     Medication Therapy Plan:         Pharmacist review requested: Yes   Extended chart review required: Yes   Comments:     Note composed:8:51 PM 12/13/2023

## 2023-12-15 DIAGNOSIS — F51.01 PRIMARY INSOMNIA: ICD-10-CM

## 2023-12-15 NOTE — TELEPHONE ENCOUNTER
No care due was identified.  [facility] Embedded Care Due Messages. Reference number: [id number].   12/15/2023 3:21:39 PM CST

## 2023-12-17 RX ORDER — TEMAZEPAM 15 MG/1
15 CAPSULE ORAL NIGHTLY
Qty: 90 CAPSULE | Refills: 1 | Status: SHIPPED | OUTPATIENT
Start: 2023-12-17

## 2024-01-01 NOTE — TELEPHONE ENCOUNTER
Care Due:                  Date            Visit Type   Department     Provider  --------------------------------------------------------------------------------                                EP -                              PRIMARY      [facility]  Last Visit: 01-      CARE ([facility])   MEDICINE       Maddi Gastelum                              EP -                              PRIMARY      [facility]  Next Visit: 07-      CARE ([facility])   [facility]       Maddi Gastelum                                                            Last  Test          Frequency    Reason                     Performed    Due Date  --------------------------------------------------------------------------------    CMP.........  12 months..  losartan, rosuvastatin...  06- 06-    Lipid Panel.  12 months..  rosuvastatin.............  06- 06-    TSH.........  12 months..  levothyroxine............  06- 06-    [facility] Embedded Care Due Messages. Reference number: [id number].   6/12/2023 10:20:37 AM CDT  
7

## 2024-01-11 ENCOUNTER — OFFICE VISIT (OUTPATIENT)
Dept: FAMILY MEDICINE | Facility: CLINIC | Age: 79
End: 2024-01-11
Payer: MEDICARE

## 2024-01-11 VITALS
SYSTOLIC BLOOD PRESSURE: 128 MMHG | HEIGHT: 71 IN | TEMPERATURE: 98 F | WEIGHT: 183.19 LBS | BODY MASS INDEX: 25.65 KG/M2 | OXYGEN SATURATION: 98 % | HEART RATE: 94 BPM | DIASTOLIC BLOOD PRESSURE: 76 MMHG

## 2024-01-11 DIAGNOSIS — K58.9 IRRITABLE BOWEL SYNDROME, UNSPECIFIED TYPE: ICD-10-CM

## 2024-01-11 DIAGNOSIS — N40.0 BENIGN NON-NODULAR PROSTATIC HYPERPLASIA WITHOUT LOWER URINARY TRACT SYMPTOMS: ICD-10-CM

## 2024-01-11 DIAGNOSIS — M19.041 PRIMARY OSTEOARTHRITIS OF BOTH HANDS: ICD-10-CM

## 2024-01-11 DIAGNOSIS — E03.9 HYPOTHYROIDISM, UNSPECIFIED TYPE: ICD-10-CM

## 2024-01-11 DIAGNOSIS — I10 ESSENTIAL HYPERTENSION: Primary | ICD-10-CM

## 2024-01-11 DIAGNOSIS — H61.23 BILATERAL IMPACTED CERUMEN: ICD-10-CM

## 2024-01-11 DIAGNOSIS — M19.042 PRIMARY OSTEOARTHRITIS OF BOTH HANDS: ICD-10-CM

## 2024-01-11 DIAGNOSIS — H91.93 BILATERAL HEARING LOSS, UNSPECIFIED HEARING LOSS TYPE: ICD-10-CM

## 2024-01-11 DIAGNOSIS — F51.01 PRIMARY INSOMNIA: ICD-10-CM

## 2024-01-11 DIAGNOSIS — M47.22 OSTEOARTHRITIS OF SPINE WITH RADICULOPATHY, CERVICAL REGION: ICD-10-CM

## 2024-01-11 DIAGNOSIS — M76.62 TENDONITIS, ACHILLES, LEFT: ICD-10-CM

## 2024-01-11 DIAGNOSIS — E78.5 HYPERLIPIDEMIA, UNSPECIFIED HYPERLIPIDEMIA TYPE: ICD-10-CM

## 2024-01-11 LAB
ANION GAP SERPL CALC-SCNC: 10 MMOL/L (ref 8–16)
BUN SERPL-MCNC: 19 MG/DL (ref 8–23)
CALCIUM SERPL-MCNC: 9.4 MG/DL (ref 8.7–10.5)
CHLORIDE SERPL-SCNC: 106 MMOL/L (ref 95–110)
CO2 SERPL-SCNC: 24 MMOL/L (ref 23–29)
CREAT SERPL-MCNC: 0.9 MG/DL (ref 0.5–1.4)
EST. GFR  (NO RACE VARIABLE): >60 ML/MIN/1.73 M^2
GLUCOSE SERPL-MCNC: 88 MG/DL (ref 70–110)
POTASSIUM SERPL-SCNC: 4.7 MMOL/L (ref 3.5–5.1)
SODIUM SERPL-SCNC: 140 MMOL/L (ref 136–145)
TSH SERPL DL<=0.005 MIU/L-ACNC: 1.33 UIU/ML (ref 0.4–4)

## 2024-01-11 PROCEDURE — 3288F FALL RISK ASSESSMENT DOCD: CPT | Mod: CPTII,S$GLB,, | Performed by: INTERNAL MEDICINE

## 2024-01-11 PROCEDURE — 1159F MED LIST DOCD IN RCRD: CPT | Mod: CPTII,S$GLB,, | Performed by: INTERNAL MEDICINE

## 2024-01-11 PROCEDURE — 3074F SYST BP LT 130 MM HG: CPT | Mod: CPTII,S$GLB,, | Performed by: INTERNAL MEDICINE

## 2024-01-11 PROCEDURE — 80048 BASIC METABOLIC PNL TOTAL CA: CPT | Performed by: INTERNAL MEDICINE

## 2024-01-11 PROCEDURE — 1126F AMNT PAIN NOTED NONE PRSNT: CPT | Mod: CPTII,S$GLB,, | Performed by: INTERNAL MEDICINE

## 2024-01-11 PROCEDURE — 99214 OFFICE O/P EST MOD 30 MIN: CPT | Mod: S$GLB,,, | Performed by: INTERNAL MEDICINE

## 2024-01-11 PROCEDURE — 84443 ASSAY THYROID STIM HORMONE: CPT | Performed by: INTERNAL MEDICINE

## 2024-01-11 PROCEDURE — 1101F PT FALLS ASSESS-DOCD LE1/YR: CPT | Mod: CPTII,S$GLB,, | Performed by: INTERNAL MEDICINE

## 2024-01-11 PROCEDURE — 1160F RVW MEDS BY RX/DR IN RCRD: CPT | Mod: CPTII,S$GLB,, | Performed by: INTERNAL MEDICINE

## 2024-01-11 PROCEDURE — 3078F DIAST BP <80 MM HG: CPT | Mod: CPTII,S$GLB,, | Performed by: INTERNAL MEDICINE

## 2024-01-11 RX ORDER — TAMSULOSIN HYDROCHLORIDE 0.4 MG/1
0.8 CAPSULE ORAL NIGHTLY
Qty: 180 CAPSULE | Refills: 1 | Status: SHIPPED | OUTPATIENT
Start: 2024-01-11

## 2024-01-11 NOTE — PROGRESS NOTES
Subjective:       Patient ID: Eric Buitrago is a 78 y.o. male.    Medication List with Changes/Refills   Current Medications    AMLODIPINE (NORVASC) 5 MG TABLET    Take 1 tablet (5 mg total) by mouth every morning AND 0.5 tablets (2.5 mg total) every evening.    AZELASTINE (ASTELIN) 137 MCG (0.1 %) NASAL SPRAY    USE 1 SPRAY NASALLY TWICE DAILY    DICLOFENAC SODIUM (VOLTAREN) 1 % GEL    Apply 2 g topically 2 (two) times daily.    DOXEPIN (SINEQUAN) 50 MG CAPSULE    TAKE TWO CAPSULES BY MOUTH ONCE IN THE EVENING.    FEXOFENADINE (ALLEGRA) 60 MG TABLET    Take 1 tablet by mouth.     GABAPENTIN (NEURONTIN) 300 MG CAPSULE    Take 1 capsule (300 mg total) by mouth every evening.    LEVOTHYROXINE (SYNTHROID) 137 MCG TAB TABLET    TAKE 1 TABLET(137 MCG) BY MOUTH BEFORE BREAKFAST    LOSARTAN (COZAAR) 50 MG TABLET    TAKE 1 TABLET(50 MG) BY MOUTH EVERY DAY    MELOXICAM (MOBIC) 15 MG TABLET    TAKE 1 TABLET(15 MG) BY MOUTH EVERY DAY    METHYLPREDNISOLONE (MEDROL DOSEPACK) 4 MG TABLET    use as directed    ROSUVASTATIN (CRESTOR) 5 MG TABLET    TAKE 1 TABLET(5 MG) BY MOUTH EVERY DAY    TEMAZEPAM (RESTORIL) 15 MG CAP    Take 1 capsule (15 mg total) by mouth every night at bedtime.   Changed and/or Refilled Medications    Modified Medication Previous Medication    TAMSULOSIN (FLOMAX) 0.4 MG CAP tamsulosin (FLOMAX) 0.4 mg Cap       Take 2 capsules (0.8 mg total) by mouth every evening.    Take 1 capsule (0.4 mg total) by mouth every evening.   Discontinued Medications    AMOXICILLIN-CLAVULANATE 875-125MG (AUGMENTIN) 875-125 MG PER TABLET    Take 1 tablet by mouth 2 (two) times daily.       Chief Complaint: Follow-up  He is here today to f/u on chronic medical issues.         He has hypertension and is taking losartan 50 mg qday and amlodipine 5 mg in the am and 2.5 mg in the pm. He denies any CAD. He denies chest pain or shortness of breath. Home BP run 130s/70s.     He has hyperlipidemia and is taking crestor 5 mg qday. His  lipids on 7/2023 were 128/94/41/68.      He has hypothyroid that is controlled on levothyroxine 137 mcg qday. His last TSH was normal on 7/2023.       He has BPH which is controlled with flomax. He says this helps with his frequency and nocturia but continues to have symptoms. PSA on 7/2023 was 1.2.       He has seasonal allergies controlled with allegra as needed and astelin which he takes daily.  He denies any active symptoms.         He has IBS for many years. He was seen by GI about 10 years ago and started on doxepin for spastic colon.  He says this has worked and he no longer has any symptoms.  His most recent colonoscopy on 2/2018 had no polyps but did show mild colonic spasms. He continues on doxepin to 100 mg daily and feels his symptoms are controlled on this dose.      He has insomnia that is controlled with PRN temazepam 15 mg 1/2 pill to 1 pill nightly.  He says it works and denies side effects.          He occasionally has low back pain if he over exerts himself. He will take mobic with good relief.  He is pain free today. Pain worse with bending or lifting.  Better with stretching and rest.       He has shoulder pain with intermittent radiation down left arm. He was seen by pain management and had an MRI on 1/2019 showing Multilevel spondylosis, greatest at C5-6 where there is mild cord flattening with effacement of ventral and dorsal CSF.  No cord signal abnormality.Multilevel foraminal stenosis, greatest at C5-6 where it is moderate-severe bilaterally. Moderate multilevel hypertrophic facet arthropathy.  He was seen by pain and started on gabaepntin 300 mg qhs which has helped with his pain.  If worsens then pain would like to do a cervical steroid injection.  He completed PT without much difference in his pain.  He reports gabapentin 300 mg nightly completely controls his pain.       He has osteoarthritis of both hands left > right. 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 He says years ago he had a steroid injection in his thumb that helped a similar problem. Rheumatologic workup was negative on 6/2021.       He was diagnosed with left achilles tendonitis that started a year ago after wearing boots on a hiking trip.  He has been followed by podiatry and completed steroid injection, wore a boot for 2 weeks and PT.  He is doing much better and continues to do the home exercises regularly.      He lives with his wife and feels safe at home. He is very active and enjoys working in his yard. He goes to the gym 3 times a week for one hour and does treadmill and weight training. He does eat healthy. No falls or balance issues. He does complain of worsening hearing. He knows he has wax impaction but he has hearing loss even when his ears are clear. He would like evaluation.      Colonoscopy---2/2018 repeat in 10 years  Tdap---9/2017  Influenza vaccine---10/2023  Pneumovax 23----6/2018  Prevnar 13----6/2017  Shingles vaccine----- 11/2018, 1/2019   Covid vaccine---5 doses  RSV vaccine---none        Review of Systems   Constitutional:  Negative for appetite change, fatigue, fever and unexpected weight change.   HENT:  Negative for congestion, ear pain, hearing loss, sore throat and trouble swallowing.    Eyes:  Negative for pain and visual disturbance.   Respiratory:  Negative for cough, chest tightness, shortness of breath and wheezing.    Cardiovascular:  Negative for chest pain, palpitations and leg swelling.   Gastrointestinal:  Negative for abdominal pain, blood in stool, constipation, diarrhea, nausea and vomiting.   Endocrine: Negative for polyuria.   Genitourinary:  Negative for dysuria and hematuria.   Musculoskeletal:  Positive for arthralgias and back pain. Negative for myalgias.   Skin:  Negative for rash.   Neurological:  Negative for dizziness, weakness, numbness and headaches.   Hematological:  Does not bruise/bleed easily.  "  Psychiatric/Behavioral:  Negative for dysphoric mood, sleep disturbance and suicidal ideas. The patient is not nervous/anxious.        Objective:      Vitals:    01/11/24 1046 01/11/24 1101   BP: (!) 150/78 128/76   BP Location: Left arm    Patient Position: Sitting    BP Method: Medium (Manual)    Pulse: 94    Temp: 98.1 °F (36.7 °C)    SpO2: 98%    Weight: 83.1 kg (183 lb 3.2 oz)    Height: 5' 11" (1.803 m)      Body mass index is 25.55 kg/m².  Physical Exam    General appearance: No acute distress, cooperative  Eyes: PERRL, EOMI, conjunctiva clear  Ears: impacted with cerumen bilateral   Nose: Normal mucosa without drainage  Throat: no exudates or erythema, tonsils not enlarged  Mouth: no sores or lesions, moist mucous membranes  Neck: FROM, soft, supple, no thyromegaly, no bruits  Lymph: no anterior or posterior cervical adenopathy  Heart::  Regular rate and rhythm, no murmur  Lung: Clear to ascultation bilaterally, no wheezing, no rales, no rhonchi, no distress  Abdomen: Soft, nontender, no distention, no hepatosplenomegaly, bowel sounds normal, no guarding, no rebound, no peritoneal signs  Skin: no rashes, no lesions  Extremities: no edema, no cyanosis  Neuro: CN 2-12 intact, 5/5 muscle strength upper and lower extremity bilaterally, 2+ DTRs UE and LE bilaterally, normal gait  Peripheral pulses: 2+ pedal pulses bilaterally, good perfusion and color  Musculoskeletal: FROM, good strenth, no tenderness  Joint: normal appearance, no swelling, no warmth, no deformity in all joints    Assessment:       1. Essential hypertension    2. Hyperlipidemia, unspecified hyperlipidemia type    3. Hypothyroidism, unspecified type    4. Irritable bowel syndrome, unspecified type    5. Benign non-nodular prostatic hyperplasia without lower urinary tract symptoms    6. Primary insomnia    7. Tendonitis, Achilles, left    8. Primary osteoarthritis of both hands    9. Osteoarthritis of spine with radiculopathy, cervical region  "   10. Bilateral hearing loss, unspecified hearing loss type    11. Bilateral impacted cerumen        Plan:       Essential hypertension  Well controlled and continue current regimen.   -     TSH  -     Basic Metabolic Panel  -     CBC Auto Differential; Future; Expected date: 01/11/2024  -     Comprehensive Metabolic Panel; Future; Expected date: 01/11/2024  -     Lipid Panel; Future; Expected date: 01/11/2024  -     TSH; Future; Expected date: 01/11/2024    Hyperlipidemia, unspecified hyperlipidemia type  Good control on crestor.     Hypothyroidism, unspecified type  Good control on this dose of levothyroxine. Will recheck thyroid function today    Irritable bowel syndrome, unspecified type  Stable on doxepin for many years    Benign non-nodular prostatic hyperplasia without lower urinary tract symptoms  Increase in symptoms and will increase his dose of flomax to 0.8 mg nightly  -     Prostate Specific Antigen, Diagnostic; Future; Expected date: 01/11/2024  -     tamsulosin (FLOMAX) 0.4 mg Cap; Take 2 capsules (0.8 mg total) by mouth every evening.  Dispense: 180 capsule; Refill: 1    Primary insomnia  Good control on temazepam.     Tendonitis, Achilles, left  S/p steroid injection and PT. He is doing better and continues with home exercises.     Primary osteoarthritis of both hands  Stable and no complaints today    Osteoarthritis of spine with radiculopathy, cervical region  At baseline and doing well    Bilateral hearing loss, unspecified hearing loss type with cerumen impaction  Referral to ENT and audiology for evaluation. He will start wax softening drops to help clear wax.   -     Ambulatory referral/consult to ENT; Future; Expected date: 01/18/2024  -     Ambulatory referral/consult to Audiology; Future; Expected date: 01/18/2024    Follow up in about 6 months (around 7/11/2024) for chronic medical issues.

## 2024-01-12 ENCOUNTER — PATIENT MESSAGE (OUTPATIENT)
Dept: FAMILY MEDICINE | Facility: CLINIC | Age: 79
End: 2024-01-12
Payer: MEDICARE

## 2024-02-27 DIAGNOSIS — Z00.00 ENCOUNTER FOR MEDICARE ANNUAL WELLNESS EXAM: ICD-10-CM

## 2024-03-14 ENCOUNTER — CLINICAL SUPPORT (OUTPATIENT)
Dept: AUDIOLOGY | Facility: CLINIC | Age: 79
End: 2024-03-14
Payer: MEDICARE

## 2024-03-14 ENCOUNTER — OFFICE VISIT (OUTPATIENT)
Dept: OTOLARYNGOLOGY | Facility: CLINIC | Age: 79
End: 2024-03-14
Payer: MEDICARE

## 2024-03-14 DIAGNOSIS — H90.3 BILATERAL HIGH FREQUENCY SENSORINEURAL HEARING LOSS: Primary | ICD-10-CM

## 2024-03-14 DIAGNOSIS — H91.93 BILATERAL HEARING LOSS, UNSPECIFIED HEARING LOSS TYPE: ICD-10-CM

## 2024-03-14 DIAGNOSIS — H61.23 BILATERAL IMPACTED CERUMEN: ICD-10-CM

## 2024-03-14 DIAGNOSIS — H90.3 SENSORINEURAL HEARING LOSS (SNHL), BILATERAL: ICD-10-CM

## 2024-03-14 PROCEDURE — 99999 PR PBB SHADOW E&M-EST. PATIENT-LVL I: CPT | Mod: PBBFAC,,, | Performed by: STUDENT IN AN ORGANIZED HEALTH CARE EDUCATION/TRAINING PROGRAM

## 2024-03-14 PROCEDURE — 99203 OFFICE O/P NEW LOW 30 MIN: CPT | Mod: 25,S$GLB,, | Performed by: STUDENT IN AN ORGANIZED HEALTH CARE EDUCATION/TRAINING PROGRAM

## 2024-03-14 PROCEDURE — 92567 TYMPANOMETRY: CPT | Mod: S$GLB,,, | Performed by: AUDIOLOGIST

## 2024-03-14 PROCEDURE — 99999 PR PBB SHADOW E&M-EST. PATIENT-LVL I: CPT | Mod: PBBFAC,,, | Performed by: AUDIOLOGIST

## 2024-03-14 PROCEDURE — 92557 COMPREHENSIVE HEARING TEST: CPT | Mod: S$GLB,,, | Performed by: AUDIOLOGIST

## 2024-03-14 PROCEDURE — G0268 REMOVAL OF IMPACTED WAX MD: HCPCS | Mod: S$GLB,,, | Performed by: STUDENT IN AN ORGANIZED HEALTH CARE EDUCATION/TRAINING PROGRAM

## 2024-03-14 NOTE — PROGRESS NOTES
Otolaryngology Clinic Note    Subjective:       Patient ID: Eric Buitrago is a 78 y.o. male.    Chief Complaint: hearing loss, wax impaction.       History of Present Illness: Eric Buitrago is a 78 y.o. male presenting with gradual hearing loss. PCP noted wax last visit. Been using debrox, then qtips. No FH early hearing loss. No ototoxic meds. No head trauma. No noise exposure. No ear infections. No dizziness.       Past Surgical History:   Procedure Laterality Date    CHOLECYSTECTOMY  2007    COLONOSCOPY  ~2007    RABITO.    COLONOSCOPY N/A 2/21/2018    Procedure: COLONOSCOPY;  Surgeon: Joseu Myles Jr., MD;  Location: [facility];  Service: Endoscopy;  Laterality: N/A;     Past Medical History:   Diagnosis Date    Allergy     Hyperlipidemia     Hypertension     Hypothyroid     IBS (irritable bowel syndrome)      Social Determinants of Health     Tobacco Use: Medium Risk (1/11/2024)    Patient History     Smoking Tobacco Use: Former     Smokeless Tobacco Use: Never     Passive Exposure: Not on file   Alcohol Use: Unknown (11/4/2021)    AUDIT-C     Frequency of Alcohol Consumption: 2-4 times a month     Average Number of Drinks: 1 or 2     Frequency of Binge Drinking: Not on file   Financial Resource Strain: Low Risk  (11/4/2021)    Overall Financial Resource Strain (CARDIA)     Difficulty of Paying Living Expenses: Not hard at all   Food Insecurity: No Food Insecurity (11/4/2021)    Hunger Vital Sign     Worried About Running Out of Food in the Last Year: Never true     Ran Out of Food in the Last Year: Never true   Transportation Needs: No Transportation Needs (11/4/2021)    PRAPARE - Transportation     Lack of Transportation (Medical): No     Lack of Transportation (Non-Medical): No   Physical Activity: Sufficiently Active (11/4/2021)    Exercise Vital Sign     Days of Exercise per Week: 5 days     Minutes of Exercise per Session: 40 min   Stress: No Stress Concern Present (11/4/2021)    [place]  [place] [employer] - Occupational Stress Questionnaire     Feeling of Stress : Only a little   Social Connections: Moderately Integrated (11/4/2021)    Social Connection and Isolation Panel [NHANES]     Frequency of Communication with Friends and Family: More than three times a week     Frequency of Social Gatherings with Friends and Family: Twice a week     Attends Yazidi Services: More than 4 times per year     Active Member of Clubs or Organizations: No     Attends Club or Organization Meetings: Never     Marital Status:    Housing Stability: Unknown (10/8/2020)    Housing Stability Vital Sign     Unable to Pay for Housing in the Last Year: No     Number of Places Lived in the Last Year: Not on file     Unstable Housing in the Last Year: No   Depression: Low Risk  (1/11/2024)    Depression     Last PHQ-4: Flowsheet Data: 0     Review of patient's allergies indicates:   Allergen Reactions    No known drug allergies      Current Outpatient Medications   Medication Instructions    amLODIPine (NORVASC) 5 MG tablet Take 1 tablet (5 mg total) by mouth every morning AND 0.5 tablets (2.5 mg total) every evening.    azelastine (ASTELIN) 137 mcg (0.1 %) nasal spray USE 1 SPRAY NASALLY TWICE DAILY    diclofenac sodium (VOLTAREN) 2 g, Topical (Top), 2 times daily    doxepin (SINEQUAN) 50 MG capsule TAKE TWO CAPSULES BY MOUTH ONCE IN THE EVENING.    fexofenadine (ALLEGRA) 60 MG tablet 1 tablet, Oral    gabapentin (NEURONTIN) 300 mg, Oral, Nightly    levothyroxine (SYNTHROID) 137 mcg, Oral, Before breakfast    losartan (COZAAR) 50 MG tablet TAKE 1 TABLET(50 MG) BY MOUTH EVERY DAY    meloxicam (MOBIC) 15 MG tablet TAKE 1 TABLET(15 MG) BY MOUTH EVERY DAY    methylPREDNISolone (MEDROL DOSEPACK) 4 mg tablet use as directed    rosuvastatin (CRESTOR) 5 MG tablet TAKE 1 TABLET(5 MG) BY MOUTH EVERY DAY    tamsulosin (FLOMAX) 0.8 mg, Oral, Nightly    temazepam (RESTORIL) 15 mg Cap Take 1 capsule (15 mg total) by  mouth every night at bedtime.         ENT ROS negative except as stated above.     Patient answers are not available for this visit.            Objective:      There were no vitals filed for this visit.    General: NAD, well appearing  Eyes: Normal conjunctiva and lids  Face: symmetric, nerve intact  Nose: The nose is without any evidence of any deformity. The nasal mucosa is moist. The septum is midline. There is no evidence of septal hematoma. The turbinates are without abnormality.   Ears: The ears are with normal-appearing pinna. Examination of the canals is normal appearing bilaterally. There is no drainage or erythema noted. The tympanic membranes are normal appearing with pearly color, normal-appearing landmarks and normal light reflex. Hearing is grossly intact.  Mouth: No obvious abnormalities to the lips. The teeth are unremarkable. The gingivae are without any obvious evidence of infection or lesion. The oral mucosa is moist and pink. There are no obvious masses to the hard or soft palate.   Oropharynx: The uvula is midline.  The tongue is midline. The posterior pharynx is without erythema or exudate. The tonsils are normal appearing.  Salivary glands: The salivary glands are symmetric and not enlarged, no masses  Neck: No lymphadenopathy, trachea midline, thryoid not enlarged.  Psych: Normal mood and affect.   Neuro: Grossly intact  Speech: fluent    Procedure:   Cerumen impaction removal  Indications: Cerumen impaction  Verbal consent obtained.   Under microscope, wax was removed from right and left ear using combination of suction, hook, curette instrumentation.   Patient tolerated procedure well.       Test Review: I personally reviewed audio         Assessment and Plan:       1. Bilateral hearing loss, unspecified hearing loss type    2. Bilateral impacted cerumen    3. Sensorineural hearing loss (SNHL), bilateral            No qtips  Wax removed    Medically cleared for hearing aids. Insignificant  asymmetry noted.     RTC: PRN    Plan of care was discussed in detail with the patient, who agreed with the plan as above. All questions were answered in detail.     Alice Padilla MD  Otolaryngology

## 2024-03-14 NOTE — PROGRESS NOTES
Eric Buitrago was seen 03/14/2024 for an audiological evaluation.      Pt is here for an audiogram to assess his hearing status.  He has a Hx of loud noise exposure (music).  He denied tinnitus for either ear.  He was told by his PCP that he had wax in both ears and has been using ear drops and q-tips to try to clean his ears.  Bilateral cerumen impactions were present upon arrival and removed by Dr. Padilla in ENT prior to his audiogram today.       Otoscopy following cerumen removal revealed clear view of both external ear canals and tympanic membranes.  No obstructive cerumen present in either ear canal.       Audiogram results revealed a mild-to-severe sensorineural hearing loss for the right ear and a mild-to-severe sensorineural hearing loss for the left ear.  Speech Reception Thresholds were 15 dBHL for the right ear and 10 dBHL for the left ear.  Word recognition scores were excellent for the right ear and excellent for the left ear.   Tympanograms were Type Ad for the right ear and Type Ad for the left ear.    Audiogram results were reviewed in detail with patient and all questions were answered. Results will be reviewed by ENT at the completion of this note.      Recommend f/u with ENT due to asymmetrical HFSNHL, binaural amplification pending medical clearance, hearing protection for all loud sounds and annual audiogram to monitor hearing loss.

## 2024-04-04 NOTE — TELEPHONE ENCOUNTER
Care Due:                  Date            Visit Type   Department     Provider  --------------------------------------------------------------------------------                                EP -                              PRIMARY      [facility]  Last Visit: 01-      CARE ([facility])   MEDICINE       Maddi Gastelum                              EP -                              PRIMARY      [facility]  Next Visit: 07-      CARE ([facility])   [facility]       Maddi Gastelum                                                            Last  Test          Frequency    Reason                     Performed    Due Date  --------------------------------------------------------------------------------    CBC.........  12 months..  diclofenac, meloxicam....  07- 07-    CMP.........  12 months..  meloxicam, rosuvastatin..  07- 07-    Lipid Panel.  12 months..  rosuvastatin.............  07- 07-    Health [facility] Embedded Care Due Messages. Reference number: [id number].   4/04/2024 3:23:59 AM CDT

## 2024-04-06 RX ORDER — GABAPENTIN 300 MG/1
300 CAPSULE ORAL NIGHTLY
Qty: 90 CAPSULE | Refills: 2 | Status: SHIPPED | OUTPATIENT
Start: 2024-04-06

## 2024-06-12 DIAGNOSIS — K58.9 IRRITABLE BOWEL SYNDROME, UNSPECIFIED TYPE: ICD-10-CM

## 2024-06-12 RX ORDER — DOXEPIN HYDROCHLORIDE 50 MG/1
CAPSULE ORAL
Qty: 180 CAPSULE | Refills: 1 | Status: SHIPPED | OUTPATIENT
Start: 2024-06-12

## 2024-06-12 NOTE — TELEPHONE ENCOUNTER
Care Due:                  Date            Visit Type   Department     Provider  --------------------------------------------------------------------------------                                EP -                              PRIMARY      [facility]  Last Visit: 01-      CARE ([facility])   MEDICINE       Maddi Gastelum                              EP -                              PRIMARY      [facility]  Next Visit: 07-      CARE ([facility])   [facility]       Maddi Gastelum                                                            Last  Test          Frequency    Reason                     Performed    Due Date  --------------------------------------------------------------------------------    CBC.........  12 months..  diclofenac, meloxicam....  07- 07-    CMP.........  12 months..  meloxicam, rosuvastatin..  07- 07-    Lipid Panel.  12 months..  rosuvastatin.............  07- 07-    Health [facility] Embedded Care Due Messages. Reference number: [id number].   6/12/2024 10:10:36 AM CDT

## 2024-06-12 NOTE — TELEPHONE ENCOUNTER
----- Message from Ethan Koroma sent at 6/12/2024 10:01 AM CDT -----  Type:  RX Refill Request    Who Called:  pt  Refill or New Rx:  refill  RX Name and Strength:  doxepin (SINEQUAN) 50 MG capsule  How is the patient currently taking it? (ex. 1XDay):  as directed  Is this a 30 day or 90 day RX:  90  Preferred Pharmacy with phone number:    [facility]/pharmacy #[id number] - [place] - [address]  [address]  [place] [address]  Phone: [phone] Fax: [phone]  Local or Mail Order:  local  Ordering Provider:  krystal Daniels Call Back Number:  [phone]  Additional Information:  Pt needs a refill on doxepin (SINEQUAN) 50 MG capsule

## 2024-06-12 NOTE — TELEPHONE ENCOUNTER
Refill Routing Note   Medication(s) are not appropriate for processing by [facility] for the following reason(s):        Drug-disease interaction    [facility] action(s):  Defer      Medication Therapy Plan: FLOS; Benign prostatic hyperplasia    Pharmacist review requested: Yes     Appointments  past 12m or future 3m with PCP    Date Provider   Last Visit   1/11/2024 Mdadi Gastelum, DO   Next Visit   7/15/2024 Maddi Gastelum,    ED visits in past 90 days: 0        Note composed:10:23 AM 06/12/2024

## 2024-06-13 NOTE — TELEPHONE ENCOUNTER
Refill Decision Note   Eric Buitrago  is requesting a refill authorization.  Brief Assessment and Rationale for Refill:  Approve     Medication Therapy Plan: FLOS; drug disease interaction reviewed, ok to fill      Pharmacist review requested: Yes   Comments:     Note composed:10:16 PM 06/12/2024

## 2024-06-17 DIAGNOSIS — F51.01 PRIMARY INSOMNIA: ICD-10-CM

## 2024-06-17 RX ORDER — TEMAZEPAM 15 MG/1
15 CAPSULE ORAL NIGHTLY
Qty: 90 CAPSULE | Refills: 1 | Status: CANCELLED | OUTPATIENT
Start: 2024-06-17

## 2024-06-17 NOTE — TELEPHONE ENCOUNTER
No care due was identified.  [facility] Embedded Care Due Messages. Reference number: [id number].   6/17/2024 1:13:37 PM CDT

## 2024-06-17 NOTE — TELEPHONE ENCOUNTER
No care due was identified.  Health [facility] Embedded Care Due Messages. Reference number: [id number].   6/17/2024 12:12:00 PM CDT

## 2024-06-18 RX ORDER — TEMAZEPAM 15 MG/1
15 CAPSULE ORAL NIGHTLY
Qty: 90 CAPSULE | Refills: 1 | Status: SHIPPED | OUTPATIENT
Start: 2024-06-18

## 2024-06-21 RX ORDER — LOSARTAN POTASSIUM 50 MG/1
TABLET ORAL
Qty: 90 TABLET | Refills: 1 | Status: SHIPPED | OUTPATIENT
Start: 2024-06-21

## 2024-06-21 NOTE — TELEPHONE ENCOUNTER
No care due was identified.  Health system Embedded Care Due Messages. Reference number: [id number].   6/21/2024 3:24:56 AM CDT

## 2024-06-21 NOTE — TELEPHONE ENCOUNTER
Refill Decision Note   Eric Buitrago  is requesting a refill authorization.  Brief Assessment and Rationale for Refill:  Approve     Medication Therapy Plan:         Comments:     Note composed:8:03 AM 06/21/2024

## 2024-07-10 ENCOUNTER — LAB VISIT (OUTPATIENT)
Dept: LAB | Facility: HOSPITAL | Age: 79
End: 2024-07-10
Attending: INTERNAL MEDICINE
Payer: MEDICARE

## 2024-07-10 DIAGNOSIS — I10 ESSENTIAL HYPERTENSION: ICD-10-CM

## 2024-07-10 DIAGNOSIS — N40.0 BENIGN NON-NODULAR PROSTATIC HYPERPLASIA WITHOUT LOWER URINARY TRACT SYMPTOMS: ICD-10-CM

## 2024-07-10 LAB
ALBUMIN SERPL BCP-MCNC: 4 G/DL (ref 3.5–5.2)
ALP SERPL-CCNC: 61 U/L (ref 55–135)
ALT SERPL W/O P-5'-P-CCNC: 26 U/L (ref 10–44)
ANION GAP SERPL CALC-SCNC: 10 MMOL/L (ref 8–16)
AST SERPL-CCNC: 20 U/L (ref 10–40)
BASOPHILS # BLD AUTO: 0.03 K/UL (ref 0–0.2)
BASOPHILS NFR BLD: 0.4 % (ref 0–1.9)
BILIRUB SERPL-MCNC: 0.8 MG/DL (ref 0.1–1)
BUN SERPL-MCNC: 22 MG/DL (ref 8–23)
CALCIUM SERPL-MCNC: 9.2 MG/DL (ref 8.7–10.5)
CHLORIDE SERPL-SCNC: 107 MMOL/L (ref 95–110)
CHOLEST SERPL-MCNC: 123 MG/DL (ref 120–199)
CHOLEST/HDLC SERPL: 3.2 {RATIO} (ref 2–5)
CO2 SERPL-SCNC: 22 MMOL/L (ref 23–29)
COMPLEXED PSA SERPL-MCNC: 2 NG/ML (ref 0–4)
CREAT SERPL-MCNC: 1.1 MG/DL (ref 0.5–1.4)
DIFFERENTIAL METHOD BLD: ABNORMAL
EOSINOPHIL # BLD AUTO: 0.3 K/UL (ref 0–0.5)
EOSINOPHIL NFR BLD: 3.4 % (ref 0–8)
ERYTHROCYTE [DISTWIDTH] IN BLOOD BY AUTOMATED COUNT: 14 % (ref 11.5–14.5)
EST. GFR  (NO RACE VARIABLE): >60 ML/MIN/1.73 M^2
GLUCOSE SERPL-MCNC: 113 MG/DL (ref 70–110)
HCT VFR BLD AUTO: 42.8 % (ref 40–54)
HDLC SERPL-MCNC: 39 MG/DL (ref 40–75)
HDLC SERPL: 31.7 % (ref 20–50)
HGB BLD-MCNC: 15.5 G/DL (ref 14–18)
IMM GRANULOCYTES # BLD AUTO: 0.04 K/UL (ref 0–0.04)
IMM GRANULOCYTES NFR BLD AUTO: 0.5 % (ref 0–0.5)
LDLC SERPL CALC-MCNC: 68.4 MG/DL (ref 63–159)
LYMPHOCYTES # BLD AUTO: 2.2 K/UL (ref 1–4.8)
LYMPHOCYTES NFR BLD: 29.7 % (ref 18–48)
MCH RBC QN AUTO: 33.2 PG (ref 27–31)
MCHC RBC AUTO-ENTMCNC: 36.2 G/DL (ref 32–36)
MCV RBC AUTO: 92 FL (ref 82–98)
MONOCYTES # BLD AUTO: 0.7 K/UL (ref 0.3–1)
MONOCYTES NFR BLD: 8.9 % (ref 4–15)
NEUTROPHILS # BLD AUTO: 4.3 K/UL (ref 1.8–7.7)
NEUTROPHILS NFR BLD: 57.1 % (ref 38–73)
NONHDLC SERPL-MCNC: 84 MG/DL
NRBC BLD-RTO: 0 /100 WBC
PLATELET # BLD AUTO: 220 K/UL (ref 150–450)
PMV BLD AUTO: 11.1 FL (ref 9.2–12.9)
POTASSIUM SERPL-SCNC: 4.5 MMOL/L (ref 3.5–5.1)
PROT SERPL-MCNC: 6.9 G/DL (ref 6–8.4)
RBC # BLD AUTO: 4.67 M/UL (ref 4.6–6.2)
SODIUM SERPL-SCNC: 139 MMOL/L (ref 136–145)
T4 FREE SERPL-MCNC: 0.93 NG/DL (ref 0.71–1.51)
TRIGL SERPL-MCNC: 78 MG/DL (ref 30–150)
TSH SERPL DL<=0.005 MIU/L-ACNC: 4.54 UIU/ML (ref 0.4–4)
WBC # BLD AUTO: 7.45 K/UL (ref 3.9–12.7)

## 2024-07-10 PROCEDURE — 84153 ASSAY OF PSA TOTAL: CPT | Performed by: INTERNAL MEDICINE

## 2024-07-10 PROCEDURE — 36415 COLL VENOUS BLD VENIPUNCTURE: CPT | Mod: PN | Performed by: INTERNAL MEDICINE

## 2024-07-10 PROCEDURE — 84439 ASSAY OF FREE THYROXINE: CPT | Performed by: INTERNAL MEDICINE

## 2024-07-10 PROCEDURE — 80061 LIPID PANEL: CPT | Performed by: INTERNAL MEDICINE

## 2024-07-10 PROCEDURE — 84443 ASSAY THYROID STIM HORMONE: CPT | Performed by: INTERNAL MEDICINE

## 2024-07-10 PROCEDURE — 85025 COMPLETE CBC W/AUTO DIFF WBC: CPT | Performed by: INTERNAL MEDICINE

## 2024-07-10 PROCEDURE — 80053 COMPREHEN METABOLIC PANEL: CPT | Performed by: INTERNAL MEDICINE

## 2024-07-11 ENCOUNTER — PATIENT MESSAGE (OUTPATIENT)
Dept: FAMILY MEDICINE | Facility: CLINIC | Age: 79
End: 2024-07-11
Payer: MEDICARE

## 2024-07-15 ENCOUNTER — OFFICE VISIT (OUTPATIENT)
Dept: FAMILY MEDICINE | Facility: CLINIC | Age: 79
End: 2024-07-15
Payer: MEDICARE

## 2024-07-15 VITALS
RESPIRATION RATE: 17 BRPM | HEIGHT: 71 IN | SYSTOLIC BLOOD PRESSURE: 118 MMHG | TEMPERATURE: 99 F | DIASTOLIC BLOOD PRESSURE: 66 MMHG | HEART RATE: 104 BPM | WEIGHT: 181.69 LBS | OXYGEN SATURATION: 97 % | BODY MASS INDEX: 25.44 KG/M2

## 2024-07-15 DIAGNOSIS — M47.22 OSTEOARTHRITIS OF SPINE WITH RADICULOPATHY, CERVICAL REGION: ICD-10-CM

## 2024-07-15 DIAGNOSIS — K58.9 IRRITABLE BOWEL SYNDROME, UNSPECIFIED TYPE: ICD-10-CM

## 2024-07-15 DIAGNOSIS — E03.9 HYPOTHYROIDISM, UNSPECIFIED TYPE: ICD-10-CM

## 2024-07-15 DIAGNOSIS — F51.01 PRIMARY INSOMNIA: ICD-10-CM

## 2024-07-15 DIAGNOSIS — J30.9 CHRONIC ALLERGIC RHINITIS: ICD-10-CM

## 2024-07-15 DIAGNOSIS — M19.042 PRIMARY OSTEOARTHRITIS OF BOTH HANDS: ICD-10-CM

## 2024-07-15 DIAGNOSIS — M47.816 SPONDYLOSIS OF LUMBAR REGION WITHOUT MYELOPATHY OR RADICULOPATHY: ICD-10-CM

## 2024-07-15 DIAGNOSIS — M19.041 PRIMARY OSTEOARTHRITIS OF BOTH HANDS: ICD-10-CM

## 2024-07-15 DIAGNOSIS — E78.5 HYPERLIPIDEMIA, UNSPECIFIED HYPERLIPIDEMIA TYPE: ICD-10-CM

## 2024-07-15 DIAGNOSIS — M76.62 TENDONITIS, ACHILLES, LEFT: ICD-10-CM

## 2024-07-15 DIAGNOSIS — I10 ESSENTIAL HYPERTENSION: Primary | ICD-10-CM

## 2024-07-15 DIAGNOSIS — R73.09 ELEVATED GLUCOSE: ICD-10-CM

## 2024-07-15 DIAGNOSIS — N40.0 BENIGN NON-NODULAR PROSTATIC HYPERPLASIA WITHOUT LOWER URINARY TRACT SYMPTOMS: ICD-10-CM

## 2024-07-15 PROCEDURE — 1159F MED LIST DOCD IN RCRD: CPT | Mod: CPTII,S$GLB,, | Performed by: INTERNAL MEDICINE

## 2024-07-15 PROCEDURE — 1126F AMNT PAIN NOTED NONE PRSNT: CPT | Mod: CPTII,S$GLB,, | Performed by: INTERNAL MEDICINE

## 2024-07-15 PROCEDURE — 3288F FALL RISK ASSESSMENT DOCD: CPT | Mod: CPTII,S$GLB,, | Performed by: INTERNAL MEDICINE

## 2024-07-15 PROCEDURE — 99214 OFFICE O/P EST MOD 30 MIN: CPT | Mod: S$GLB,,, | Performed by: INTERNAL MEDICINE

## 2024-07-15 PROCEDURE — 1160F RVW MEDS BY RX/DR IN RCRD: CPT | Mod: CPTII,S$GLB,, | Performed by: INTERNAL MEDICINE

## 2024-07-15 PROCEDURE — G2211 COMPLEX E/M VISIT ADD ON: HCPCS | Mod: S$GLB,,, | Performed by: INTERNAL MEDICINE

## 2024-07-15 PROCEDURE — 1101F PT FALLS ASSESS-DOCD LE1/YR: CPT | Mod: CPTII,S$GLB,, | Performed by: INTERNAL MEDICINE

## 2024-07-15 PROCEDURE — 3078F DIAST BP <80 MM HG: CPT | Mod: CPTII,S$GLB,, | Performed by: INTERNAL MEDICINE

## 2024-07-15 PROCEDURE — 3074F SYST BP LT 130 MM HG: CPT | Mod: CPTII,S$GLB,, | Performed by: INTERNAL MEDICINE

## 2024-07-15 RX ORDER — TAMSULOSIN HYDROCHLORIDE 0.4 MG/1
0.8 CAPSULE ORAL NIGHTLY
Qty: 180 CAPSULE | Refills: 1 | Status: SHIPPED | OUTPATIENT
Start: 2024-07-15

## 2024-07-15 NOTE — PROGRESS NOTES
Subjective:       Patient ID: Eric Buitrago is a 79 y.o. male.    Medication List with Changes/Refills   Current Medications    AMLODIPINE (NORVASC) 5 MG TABLET    Take 1 tablet (5 mg total) by mouth every morning AND 0.5 tablets (2.5 mg total) every evening.    AZELASTINE (ASTELIN) 137 MCG (0.1 %) NASAL SPRAY    USE 1 SPRAY NASALLY TWICE DAILY    DICLOFENAC SODIUM (VOLTAREN) 1 % GEL    Apply 2 g topically 2 (two) times daily.    DOXEPIN (SINEQUAN) 50 MG CAPSULE    TAKE TWO CAPSULES BY MOUTH ONCE IN THE EVENING.    FEXOFENADINE (ALLEGRA) 60 MG TABLET    Take 1 tablet by mouth.     GABAPENTIN (NEURONTIN) 300 MG CAPSULE    TAKE 1 CAPSULE(300 MG) BY MOUTH EVERY EVENING    LEVOTHYROXINE (SYNTHROID) 137 MCG TAB TABLET    TAKE 1 TABLET(137 MCG) BY MOUTH BEFORE BREAKFAST    LOSARTAN (COZAAR) 50 MG TABLET    TAKE 1 TABLET(50 MG) BY MOUTH EVERY DAY    MELOXICAM (MOBIC) 15 MG TABLET    TAKE 1 TABLET(15 MG) BY MOUTH EVERY DAY    METHYLPREDNISOLONE (MEDROL DOSEPACK) 4 MG TABLET    use as directed    ROSUVASTATIN (CRESTOR) 5 MG TABLET    TAKE 1 TABLET(5 MG) BY MOUTH EVERY DAY    TEMAZEPAM (RESTORIL) 15 MG CAP    Take 1 capsule (15 mg total) by mouth every night at bedtime.   Changed and/or Refilled Medications    Modified Medication Previous Medication    TAMSULOSIN (FLOMAX) 0.4 MG CAP tamsulosin (FLOMAX) 0.4 mg Cap       Take 2 capsules (0.8 mg total) by mouth every evening.    Take 2 capsules (0.8 mg total) by mouth every evening.       Chief Complaint: Follow-up  He is here today to f/u on chronic medical issues.         He has hypertension and is taking losartan 50 mg qday and amlodipine 5 mg in the am and 2.5 mg in the pm. He denies any CAD. He denies chest pain or shortness of breath. Home BP run 130s/70s.     He has hyperlipidemia and is taking crestor 5 mg qday. His lipids on 7/2024 were 123/78/39/68.      He has hypothyroid that is controlled on levothyroxine 137 mcg qday. His last TSH was slow on 7/2024 at 4.5.        He has BPH which is controlled with flomax 0.8 mg nightly. He says this helps with his frequency and nocturia. PSA on 7/2024 was 2.0.       He has seasonal allergies controlled with allegra as needed and astelin which he takes daily.  He denies any active symptoms.         He has IBS for many years. He was seen by GI about 10 years ago and started on doxepin for spastic colon.  He says this has worked and he no longer has any symptoms.  His most recent colonoscopy on 2/2018 had no polyps but did show mild colonic spasms. He continues on doxepin to 100 mg daily and feels his symptoms are controlled on this dose.      He has insomnia that is controlled with PRN temazepam 15 mg 1/2 pill to 1 pill nightly.  He says it works and denies side effects.          He occasionally has low back pain if he over exerts himself. He will take mobic with good relief.  He is pain free today. Pain worse with bending or lifting.  Better with stretching and rest.       He has shoulder pain with intermittent radiation down left arm. He was seen by pain management and had an MRI on 1/2019 showing Multilevel spondylosis, greatest at C5-6 where there is mild cord flattening with effacement of ventral and dorsal CSF.  No cord signal abnormality.Multilevel foraminal stenosis, greatest at C5-6 where it is moderate-severe bilaterally. Moderate multilevel hypertrophic facet arthropathy.  He was seen by pain and started on gabaepntin 300 mg qhs which has helped with his pain.  If worsens then pain would like to do a cervical steroid injection.  He completed PT without much difference in his pain.  He reports gabapentin 300 mg nightly completely controls his pain.       He has osteoarthritis of both hands left > right. 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 He says years ago he had a steroid injection in his thumb that helped a similar problem. Rheumatologic workup was negative  on 6/2021.        He was diagnosed with left achilles tendonitis that started a year ago after wearing boots on a hiking trip.  He has been followed by podiatry and completed steroid injection, wore a boot for 2 weeks and PT.  He is doing much better and continues to do the home exercises regularly.      He lives with his wife and feels safe at home. He is very active and enjoys working in his yard. He goes to the gym 2-3 times a week for one hour and does bike and weight training. He does eat healthy. No falls or balance issues.      Colonoscopy---2/2018 repeat in 10 years  Tdap---9/2017  Influenza vaccine---10/2023  Pneumovax 23----6/2018  Prevnar 13----6/2017  Shingles vaccine----- 11/2018, 1/2019   Covid vaccine---5 doses  RSV vaccine---none        Review of Systems   Constitutional:  Negative for appetite change, fatigue, fever and unexpected weight change.   HENT:  Negative for congestion, ear pain, hearing loss, sore throat and trouble swallowing.    Eyes:  Negative for pain and visual disturbance.   Respiratory:  Negative for cough, chest tightness, shortness of breath and wheezing.    Cardiovascular:  Negative for chest pain, palpitations and leg swelling.   Gastrointestinal:  Negative for abdominal pain, blood in stool, constipation, diarrhea, nausea and vomiting.   Endocrine: Negative for polyuria.   Genitourinary:  Negative for dysuria and hematuria.   Musculoskeletal:  Positive for arthralgias. Negative for back pain and myalgias.   Skin:  Negative for rash.   Neurological:  Negative for dizziness, weakness, numbness and headaches.   Hematological:  Does not bruise/bleed easily.   Psychiatric/Behavioral:  Negative for dysphoric mood, sleep disturbance and suicidal ideas. The patient is not nervous/anxious.        Objective:      Vitals:    07/15/24 1016 07/15/24 1032   BP: 138/66 118/66   BP Location: Left arm    Patient Position: Sitting    BP Method: Medium (Manual)    Pulse: 104    Resp: 17    Temp:  "98.8 °F (37.1 °C)    SpO2: 97%    Weight: 82.4 kg (181 lb 10.5 oz)    Height: 5' 11" (1.803 m)      Body mass index is 25.34 kg/m².  Physical Exam    General appearance: No acute distress, cooperative  Eyes: PERRL, EOMI, conjunctiva clear  Ears: normal external ear and pinna, tm clear without drainage, canals clear  Nose: Normal mucosa without drainage  Throat: no exudates or erythema, tonsils not enlarged  Mouth: no sores or lesions, moist mucous membranes  Neck: FROM, soft, supple, no thyromegaly, no bruits  Lymph: no anterior or posterior cervical adenopathy  Heart::  Regular rate and rhythm, no murmur  Lung: Clear to ascultation bilaterally, no wheezing, no rales, no rhonchi, no distress  Abdomen: Soft, nontender, no distention, no hepatosplenomegaly, bowel sounds normal, no guarding, no rebound, no peritoneal signs  Skin: no rashes, no lesions  Extremities: no edema, no cyanosis  Neuro: CN 2-12 intact, 5/5 muscle strength upper and lower extremity bilaterally, 2+ DTRs UE and LE bilaterally, normal gait  Peripheral pulses: 2+ pedal pulses bilaterally, good perfusion and color  Musculoskeletal: FROM, good strenth, no tenderness  Joint: normal appearance, no swelling, no warmth, deformity with nodules, deviation and enlargement of bilateral hands MCP, DIP joints     Assessment:       1. Essential hypertension    2. Hyperlipidemia, unspecified hyperlipidemia type    3. Chronic allergic rhinitis    4. Hypothyroidism, unspecified type    5. Elevated glucose    6. Benign non-nodular prostatic hyperplasia without lower urinary tract symptoms    7. Irritable bowel syndrome, unspecified type    8. Primary insomnia    9. Tendonitis, Achilles, left    10. Primary osteoarthritis of both hands    11. Osteoarthritis of spine with radiculopathy, cervical region    12. Spondylosis of lumbar region without myelopathy or radiculopathy        Plan:       Essential hypertension  Well controlled and continue current regimen.   -     " Comprehensive Metabolic Panel; Future; Expected date: 07/15/2024  -     TSH; Future; Expected date: 07/15/2024    Hyperlipidemia, unspecified hyperlipidemia type  Good control on crestor    Chronic allergic rhinitis  Well controlled and continue current regimen.     Hypothyroidism, unspecified type  Noted to be elevated and will recheck TSH in 4 weeks on levothyhroxine 137 mcg lucero (no adjustment made). Will adjust medication if needed.   -     TSH; Future; Expected date: 07/15/2024    Elevated glucose  Noted to be elevated on labs and will recheck HbA1c.   -     HEMOGLOBIN A1C; Future; Expected date: 07/15/2024  -     Hemoglobin A1C; Future; Expected date: 07/15/2024    Benign non-nodular prostatic hyperplasia without lower urinary tract symptoms  Good control on flomax  -     tamsulosin (FLOMAX) 0.4 mg Cap; Take 2 capsules (0.8 mg total) by mouth every evening.  Dispense: 180 capsule; Refill: 1    Irritable bowel syndrome, unspecified type  Stable on doxepin    Primary insomnia  STable on temazepam    Tendonitis, Achilles, left  Stable and continue to monitor    Primary osteoarthritis of both hands  Uncontrolled and he continues with topical NSAIDs    Osteoarthritis of spine with radiculopathy, cervical region  Doing well on gabapentin    Spondylosis of lumbar region without myelopathy or radiculopathy  STable and no complaints today    Follow up in about 6 months (around 1/15/2025) for chronic medical issues.

## 2024-08-15 ENCOUNTER — LAB VISIT (OUTPATIENT)
Dept: LAB | Facility: HOSPITAL | Age: 79
End: 2024-08-15
Attending: INTERNAL MEDICINE
Payer: MEDICARE

## 2024-08-15 DIAGNOSIS — I10 ESSENTIAL HYPERTENSION: ICD-10-CM

## 2024-08-15 DIAGNOSIS — R73.09 ELEVATED GLUCOSE: ICD-10-CM

## 2024-08-15 LAB
ESTIMATED AVG GLUCOSE: 91 MG/DL (ref 68–131)
HBA1C MFR BLD: 4.8 % (ref 4–5.6)
TSH SERPL DL<=0.005 MIU/L-ACNC: 2.59 UIU/ML (ref 0.4–4)

## 2024-08-15 PROCEDURE — 84443 ASSAY THYROID STIM HORMONE: CPT | Performed by: INTERNAL MEDICINE

## 2024-08-15 PROCEDURE — 36415 COLL VENOUS BLD VENIPUNCTURE: CPT | Mod: PN | Performed by: INTERNAL MEDICINE

## 2024-08-15 PROCEDURE — 83036 HEMOGLOBIN GLYCOSYLATED A1C: CPT | Performed by: INTERNAL MEDICINE

## 2024-08-16 ENCOUNTER — PATIENT MESSAGE (OUTPATIENT)
Dept: FAMILY MEDICINE | Facility: CLINIC | Age: 79
End: 2024-08-16
Payer: MEDICARE

## 2024-08-23 DIAGNOSIS — M47.816 SPONDYLOSIS OF LUMBAR REGION WITHOUT MYELOPATHY OR RADICULOPATHY: ICD-10-CM

## 2024-08-23 RX ORDER — MELOXICAM 15 MG/1
TABLET ORAL
Qty: 90 TABLET | Refills: 3 | Status: SHIPPED | OUTPATIENT
Start: 2024-08-23

## 2024-08-23 NOTE — TELEPHONE ENCOUNTER
No care due was identified.  [facility] Embedded Care Due Messages. Reference number: [id number].   8/23/2024 3:23:25 AM CDT

## 2024-08-23 NOTE — TELEPHONE ENCOUNTER
Refill Routing Note   Medication(s) are not appropriate for processing by [facility] for the following reason(s):        Outside of protocol    [facility] action(s):  Route             Appointments  past 12m or future 3m with PCP    Date Provider   Last Visit   7/15/2024 Maddi Gastelum, DO   Next Visit   1/16/2025 Maddi Gastelum, DO   ED visits in past 90 days: 0        Note composed:8:02 AM 08/23/2024

## 2024-08-24 DIAGNOSIS — E78.5 HYPERLIPIDEMIA, UNSPECIFIED HYPERLIPIDEMIA TYPE: ICD-10-CM

## 2024-08-24 RX ORDER — LEVOTHYROXINE SODIUM 137 UG/1
137 TABLET ORAL
Qty: 90 TABLET | Refills: 3 | Status: SHIPPED | OUTPATIENT
Start: 2024-08-24

## 2024-08-24 RX ORDER — ROSUVASTATIN CALCIUM 5 MG/1
TABLET, COATED ORAL
Qty: 90 TABLET | Refills: 3 | Status: SHIPPED | OUTPATIENT
Start: 2024-08-24

## 2024-08-24 NOTE — TELEPHONE ENCOUNTER
No care due was identified.  [facility] Embedded Care Due Messages. Reference number: [id number].   8/24/2024 3:22:55 AM CDT

## 2024-08-24 NOTE — TELEPHONE ENCOUNTER
Refill Decision Note   Eric Buitrago  is requesting a refill authorization.  Brief Assessment and Rationale for Refill:  Approve     Medication Therapy Plan:         Comments:     Note composed:4:51 PM 08/24/2024

## 2024-10-07 DIAGNOSIS — J06.9 UPPER RESPIRATORY TRACT INFECTION, UNSPECIFIED TYPE: ICD-10-CM

## 2024-10-07 DIAGNOSIS — J01.80 OTHER ACUTE SINUSITIS, RECURRENCE NOT SPECIFIED: ICD-10-CM

## 2024-10-07 NOTE — TELEPHONE ENCOUNTER
No care due was identified.  Health [facility] Embedded Care Due Messages. Reference number: [id number].   10/07/2024 12:01:52 PM CDT

## 2024-10-08 NOTE — TELEPHONE ENCOUNTER
Refill Routing Note   Medication(s) are not appropriate for processing by [facility] for the following reason(s):        Due for refill >6 months ago    [facility] action(s):  Defer               Appointments  past 12m or future 3m with PCP    Date Provider   Last Visit   7/15/2024 Maddi Gastelum, DO   Next Visit   1/16/2025 Maddi Gastelum, DO   ED visits in past 90 days: 0        Note composed:2:10 AM 10/08/2024

## 2024-10-10 RX ORDER — AZELASTINE 1 MG/ML
SPRAY, METERED NASAL
Qty: 90 ML | Refills: 3 | Status: SHIPPED | OUTPATIENT
Start: 2024-10-10

## 2024-10-22 ENCOUNTER — LAB VISIT (OUTPATIENT)
Dept: LAB | Facility: HOSPITAL | Age: 79
End: 2024-10-22
Attending: PODIATRIST
Payer: MEDICARE

## 2024-10-22 ENCOUNTER — OFFICE VISIT (OUTPATIENT)
Dept: PODIATRY | Facility: CLINIC | Age: 79
End: 2024-10-22
Payer: MEDICARE

## 2024-10-22 VITALS — BODY MASS INDEX: 25.44 KG/M2 | WEIGHT: 181.69 LBS | HEIGHT: 71 IN

## 2024-10-22 DIAGNOSIS — M76.62 ACHILLES TENDINITIS OF LEFT LOWER EXTREMITY: Primary | ICD-10-CM

## 2024-10-22 DIAGNOSIS — M77.52 POSTCALCANEAL BURSITIS OF LEFT FOOT: ICD-10-CM

## 2024-10-22 DIAGNOSIS — M89.8X7 RETROCALCANEAL EXOSTOSIS: ICD-10-CM

## 2024-10-22 DIAGNOSIS — B35.1 ONYCHOMYCOSIS DUE TO DERMATOPHYTE: ICD-10-CM

## 2024-10-22 LAB
ALBUMIN SERPL BCP-MCNC: 4.1 G/DL (ref 3.5–5.2)
ALP SERPL-CCNC: 62 U/L (ref 40–150)
ALT SERPL W/O P-5'-P-CCNC: 29 U/L (ref 10–44)
AST SERPL-CCNC: 19 U/L (ref 10–40)
BILIRUB DIRECT SERPL-MCNC: 0.3 MG/DL (ref 0.1–0.3)
BILIRUB SERPL-MCNC: 0.7 MG/DL (ref 0.1–1)
PROT SERPL-MCNC: 6.8 G/DL (ref 6–8.4)

## 2024-10-22 PROCEDURE — 3288F FALL RISK ASSESSMENT DOCD: CPT | Mod: CPTII,S$GLB,, | Performed by: PODIATRIST

## 2024-10-22 PROCEDURE — 1125F AMNT PAIN NOTED PAIN PRSNT: CPT | Mod: CPTII,S$GLB,, | Performed by: PODIATRIST

## 2024-10-22 PROCEDURE — 1159F MED LIST DOCD IN RCRD: CPT | Mod: CPTII,S$GLB,, | Performed by: PODIATRIST

## 2024-10-22 PROCEDURE — 1101F PT FALLS ASSESS-DOCD LE1/YR: CPT | Mod: CPTII,S$GLB,, | Performed by: PODIATRIST

## 2024-10-22 PROCEDURE — 99214 OFFICE O/P EST MOD 30 MIN: CPT | Mod: S$GLB,,, | Performed by: PODIATRIST

## 2024-10-22 PROCEDURE — 36415 COLL VENOUS BLD VENIPUNCTURE: CPT | Mod: PO | Performed by: PODIATRIST

## 2024-10-22 PROCEDURE — 80076 HEPATIC FUNCTION PANEL: CPT | Performed by: PODIATRIST

## 2024-10-22 PROCEDURE — 99999 PR PBB SHADOW E&M-EST. PATIENT-LVL II: CPT | Mod: PBBFAC,,, | Performed by: PODIATRIST

## 2024-10-22 NOTE — PROGRESS NOTES
Subjective:     Patient ID: Eric Buitrago is a 79 y.o. male.    Chief Complaint: Foot Pain  Patient presents to clinic for a follow up regarding Lt. Posterior heel pain.  Notes the prior steroid injection did provide roughly 2 months of relief.  However, symptoms have recurred.  Notes attempting to be quite active, specifically with working in the yard.  Denies any new injury to the limb.  He continues wearing shoe gear that minimizes pressure to the affected area.  Inquires as to how we should proceed.  Also, notes having a fungally infected Lt. Great toenail.  Inquires as to the most effective method of resolving this infection.  Denies any additional pedal complaints.     Past Medical History:   Diagnosis Date    Allergy     Hyperlipidemia     Hypertension     Hypothyroid     IBS (irritable bowel syndrome)        Past Surgical History:   Procedure Laterality Date    CHOLECYSTECTOMY      COLONOSCOPY  ~    RABITO.    COLONOSCOPY N/A 2018    Procedure: COLONOSCOPY;  Surgeon: Josue Myles Jr., MD;  Location: [facility];  Service: Endoscopy;  Laterality: N/A;       Family History   Problem Relation Name Age of Onset    Lymphoma Mother      Stroke Father      Breast cancer Sister      Leukemia Brother      Glaucoma Neg Hx      Macular degeneration Neg Hx      Retinal detachment Neg Hx         Social History     Socioeconomic History    Marital status:    Occupational History    Occupation: retired school psycholgist   Tobacco Use    Smoking status: Former     Current packs/day: 0.00     Average packs/day: 0.3 packs/day for 10.0 years (2.5 ttl pk-yrs)     Types: Cigarettes     Start date: 1973     Quit date: 1983     Years since quittin.3    Smokeless tobacco: Never   Substance and Sexual Activity    Alcohol use: Yes     Comment: occ    Drug use: No    Sexual activity: Not Currently     Social Drivers of Health     Financial Resource Strain: Low Risk  (2024)    Overall  Financial Resource Strain (CARDIA)     Difficulty of Paying Living Expenses: Not hard at all   Food Insecurity: No Food Insecurity (7/14/2024)    Hunger Vital Sign     Worried About Running Out of Food in the Last Year: Never true     Ran Out of Food in the Last Year: Never true   Transportation Needs: No Transportation Needs (11/4/2021)    PRAPARE - Transportation     Lack of Transportation (Medical): No     Lack of Transportation (Non-Medical): No   Physical Activity: Sufficiently Active (7/14/2024)    Exercise Vital Sign     Days of Exercise per Week: 3 days     Minutes of Exercise per Session: 60 min   Stress: No Stress Concern Present (7/14/2024)    [employer] - Occupational Stress Questionnaire     Feeling of Stress : Only a little   Housing Stability: Unknown (10/8/2020)    Housing Stability Vital Sign     Unable to Pay for Housing in the Last Year: No     Unstable Housing in the Last Year: No       Current Outpatient Medications   Medication Sig Dispense Refill    amLODIPine (NORVASC) 5 MG tablet Take 1 tablet (5 mg total) by mouth every morning AND 0.5 tablets (2.5 mg total) every evening. 135 tablet 3    azelastine (ASTELIN) 137 mcg (0.1 %) nasal spray SPRAY 1 SPRAY IN EACH NOSTRIL TWICE DAILY 90 mL 3    diclofenac sodium (VOLTAREN) 1 % Gel Apply 2 g topically 2 (two) times daily. 100 g 4    doxepin (SINEQUAN) 50 MG capsule TAKE TWO CAPSULES BY MOUTH ONCE IN THE EVENING. 180 capsule 1    fexofenadine (ALLEGRA) 60 MG tablet Take 1 tablet by mouth.       gabapentin (NEURONTIN) 300 MG capsule TAKE 1 CAPSULE(300 MG) BY MOUTH EVERY EVENING 90 capsule 2    levothyroxine (SYNTHROID) 137 MCG Tab tablet TAKE 1 TABLET(137 MCG) BY MOUTH BEFORE BREAKFAST 90 tablet 3    losartan (COZAAR) 50 MG tablet TAKE 1 TABLET(50 MG) BY MOUTH EVERY DAY 90 tablet 1    meloxicam (MOBIC) 15 MG tablet TAKE 1 TABLET(15 MG) BY MOUTH EVERY DAY 90 tablet 3    methylPREDNISolone (MEDROL DOSEPACK) 4 mg tablet  use as directed (Patient not taking: Reported on 7/15/2024) 1 each 0    rosuvastatin (CRESTOR) 5 MG tablet TAKE 1 TABLET(5 MG) BY MOUTH EVERY DAY 90 tablet 3    tamsulosin (FLOMAX) 0.4 mg Cap Take 2 capsules (0.8 mg total) by mouth every evening. 180 capsule 1    temazepam (RESTORIL) 15 mg Cap Take 1 capsule (15 mg total) by mouth every night at bedtime. 90 capsule 1     No current facility-administered medications for this visit.       Review of patient's allergies indicates:   Allergen Reactions    No known drug allergies         Hemoglobin A1C   Date Value Ref Range Status   08/15/2024 4.8 4.0 - 5.6 % Final     Comment:     ADA Screening Guidelines:  5.7-6.4%  Consistent with prediabetes  >or=6.5%  Consistent with diabetes    High levels of fetal hemoglobin interfere with the HbA1C  assay. Heterozygous hemoglobin variants (HbS, HgC, etc)do  not significantly interfere with this assay.   However, presence of multiple variants may affect accuracy.     12/14/2017 4.5 4.0 - 5.6 % Final     Comment:     According to ADA guidelines, hemoglobin A1c <7.0% represents  optimal control in non-pregnant diabetic patients. Different  metrics may apply to specific patient populations.   Standards of Medical Care in Diabetes-2016.  For the purpose of screening for the presence of diabetes:  <5.7%     Consistent with the absence of diabetes  5.7-6.4%  Consistent with increasing risk for diabetes   (prediabetes)  >or=6.5%  Consistent with diabetes  Currently, no consensus exists for use of hemoglobin A1c  for diagnosis of diabetes for children.  This Hemoglobin A1c assay has significant interference with fetal   hemoglobin   (HbF). The results are invalid for patients with abnormal amounts of   HbF,   including those with known Hereditary Persistence   of Fetal Hemoglobin. Heterozygous hemoglobin variants (HbAS, HbAC,   HbAD, HbAE, HbA2) do not significantly interfere with this assay;   however, presence of multiple variants in a  sample may impact the %   interference.     02/14/2011 4.7 4.0 - 6.2 % Final       Review of Systems   Constitutional: Negative for chills and fever.   Cardiovascular:  Negative for claudication and leg swelling.   Skin:  Positive for color change and nail changes.   Musculoskeletal:  Positive for myalgias. Negative for joint pain, joint swelling, muscle cramps and muscle weakness.   Gastrointestinal:  Negative for nausea and vomiting.   Neurological:  Negative for numbness and paresthesias.   Psychiatric/Behavioral:  Negative for altered mental status.         Objective:     Physical Exam  Constitutional:       Appearance: Normal appearance. He is not ill-appearing.   Cardiovascular:      Pulses:           Dorsalis pedis pulses are 2+ on the right side and 2+ on the left side.        Posterior tibial pulses are 2+ on the right side and 2+ on the left side.      Comments: CFT is < 3 seconds bilateral.  Pedal hair growth is present bilateral.  No lower extremity edema noted bilateral.  Toes are warm to touch bilateral.    Musculoskeletal:         General: Tenderness present. No deformity or signs of injury.      Right lower leg: No edema.      Left lower leg: No edema.      Comments: Muscle strength 5/5 in all muscle groups bilateral.  No tenderness nor crepitation with ROM of foot/ankle joints bilateral.  (+) for pain with palpation to the bursa overlying the Lt. Retrocalcaneal exostosis.  Lt. Sided gastrocnemius equinus with significant improvement in dorsiflexion.  (+) for pain with palpation to the Lt. Achilles tendon at its insertion.  (-) dell or defect noted to said tendon.     Skin:     General: Skin is warm and dry.      Capillary Refill: Capillary refill takes 2 to 3 seconds.      Findings: No bruising, ecchymosis, erythema, signs of injury, laceration, lesion, petechiae, rash or wound.      Comments: Pedal skin has normal turgor, temperature, and texture bilateral.  Significant mycotic changes noted to the  "Lt. Hallux toenail.  Remaining toenails x 9 appear normotrophic.    Neurological:      General: No focal deficit present.      Mental Status: He is alert.      Sensory: No sensory deficit.      Motor: No weakness or atrophy.      Comments: Light touch is intact bilateral.             Assessment:      Encounter Diagnoses   Name Primary?    Achilles tendinitis of left lower extremity Yes    Postcalcaneal bursitis of left foot     Retrocalcaneal exostosis     Onychomycosis due to dermatophyte      Plan:     Eric Razo" was seen today for foot pain.    Diagnoses and all orders for this visit:    Achilles tendinitis of left lower extremity  -     MRI Hindfoot WO Contrast LT; Future    Postcalcaneal bursitis of left foot    Retrocalcaneal exostosis    Onychomycosis due to dermatophyte  -     Hepatic Function Panel; Future      I counseled the patient on his conditions, their implications and medical management.    Discussed with patient a variety of treatment options to address onychomycosis including Jeffrey Vaporub, topical/oral antifungals, and laser therapy.  Patient is amenable to oral medication.    Orders placed for a hepatic enzyme panel prior to prescribing the 1st month of medication.    Advised to regularly clean shoe gear and shower surfaces to limit exposure.    Advised to be wary of walking barefoot on carpeted surfaces at gyms and hotel rooms.  Also, avoid barefoot walking at public showers and pool areas.    Orders written for a MRI of the Lt. Hindfoot.  Would like to rule out an Achilles tendon tear prior to proceeding with another retrocalcaneal bursa injection.    To continue wearing shoe gear that minimizes pressure to the site.    To continue icing and applying voltaren to the site.    RTC pending MRI results.     Mikhail Mcmullen DPM       "

## 2024-10-23 ENCOUNTER — TELEPHONE (OUTPATIENT)
Dept: PODIATRY | Facility: CLINIC | Age: 79
End: 2024-10-23
Payer: MEDICARE

## 2024-10-23 ENCOUNTER — PATIENT MESSAGE (OUTPATIENT)
Dept: PODIATRY | Facility: CLINIC | Age: 79
End: 2024-10-23
Payer: MEDICARE

## 2024-10-23 DIAGNOSIS — B35.1 ONYCHOMYCOSIS DUE TO DERMATOPHYTE: Primary | ICD-10-CM

## 2024-10-23 RX ORDER — TERBINAFINE HYDROCHLORIDE 250 MG/1
250 TABLET ORAL DAILY
Qty: 30 TABLET | Refills: 0 | Status: SHIPPED | OUTPATIENT
Start: 2024-10-23 | End: 2024-11-22

## 2024-10-23 NOTE — TELEPHONE ENCOUNTER
----- Message from Mikhail Mcmullen DPM sent at 10/23/2024  7:08 AM CDT -----  Please let the patient know his liver enzyme test was normal.  I have sent the 1st month of medication to his pharmacy.  ----- Message -----  From: [facility]  Sent: 10/22/2024   8:18 PM CDT  To: Mikhail Mcmullen DPM

## 2024-10-23 NOTE — TELEPHONE ENCOUNTER
Spoke with the patient to provide results per provider's request. Patient was notified that the RX was sent to the correct pharmacy. He expressed understanding of the message.

## 2024-10-23 NOTE — TELEPHONE ENCOUNTER
Called patient to provide results from provider. Phone went to [facility] and a message was let for a call back.

## 2024-10-23 NOTE — TELEPHONE ENCOUNTER
----- Message from Mikhail Mcmullen DPM sent at 10/23/2024  7:08 AM CDT -----  Please let the patient know his liver enzyme test was normal.  I have sent the 1st month of medication to his pharmacy.  ----- Message -----  From: [facility]  Sent: 10/22/2024   8:18 PM CDT  To: Mikhail Mcmullen DPM

## 2024-10-31 ENCOUNTER — HOSPITAL ENCOUNTER (OUTPATIENT)
Dept: RADIOLOGY | Facility: HOSPITAL | Age: 79
Discharge: HOME | End: 2024-10-31
Attending: PODIATRIST
Payer: MEDICARE

## 2024-10-31 DIAGNOSIS — M76.62 ACHILLES TENDINITIS OF LEFT LOWER EXTREMITY: ICD-10-CM

## 2024-10-31 PROCEDURE — 73718 MRI LOWER EXTREMITY W/O DYE: CPT | Mod: TC,PO,LT

## 2024-10-31 PROCEDURE — 73718 MRI LOWER EXTREMITY W/O DYE: CPT | Mod: 26,LT,, | Performed by: INTERNAL MEDICINE

## 2024-11-14 RX ORDER — AMLODIPINE BESYLATE 5 MG/1
TABLET ORAL
Qty: 135 TABLET | Refills: 2 | Status: SHIPPED | OUTPATIENT
Start: 2024-11-14

## 2024-11-14 NOTE — TELEPHONE ENCOUNTER
Refill Decision Note   Eric Buitrago  is requesting a refill authorization.  Brief Assessment and Rationale for Refill:  Approve     Medication Therapy Plan:         Comments:     Note composed:6:33 AM 11/14/2024

## 2024-11-14 NOTE — TELEPHONE ENCOUNTER
No care due was identified.  [facility] Embedded Care Due Messages. Reference number: [id number].   11/14/2024 3:23:28 AM CST

## 2024-11-21 ENCOUNTER — LAB VISIT (OUTPATIENT)
Dept: LAB | Facility: HOSPITAL | Age: 79
End: 2024-11-21
Attending: PODIATRIST
Payer: MEDICARE

## 2024-11-21 ENCOUNTER — OFFICE VISIT (OUTPATIENT)
Dept: PODIATRY | Facility: CLINIC | Age: 79
End: 2024-11-21
Payer: MEDICARE

## 2024-11-21 VITALS — WEIGHT: 181.69 LBS | HEIGHT: 71 IN | BODY MASS INDEX: 25.44 KG/M2

## 2024-11-21 DIAGNOSIS — M77.52 POSTCALCANEAL BURSITIS OF LEFT FOOT: ICD-10-CM

## 2024-11-21 DIAGNOSIS — B35.1 ONYCHOMYCOSIS DUE TO DERMATOPHYTE: ICD-10-CM

## 2024-11-21 DIAGNOSIS — B35.1 ONYCHOMYCOSIS DUE TO DERMATOPHYTE: Primary | ICD-10-CM

## 2024-11-21 LAB
ALBUMIN SERPL BCP-MCNC: 4.3 G/DL (ref 3.5–5.2)
ALP SERPL-CCNC: 64 U/L (ref 40–150)
ALT SERPL W/O P-5'-P-CCNC: 19 U/L (ref 10–44)
AST SERPL-CCNC: 17 U/L (ref 10–40)
BILIRUB DIRECT SERPL-MCNC: 0.2 MG/DL (ref 0.1–0.3)
BILIRUB SERPL-MCNC: 0.5 MG/DL (ref 0.1–1)
PROT SERPL-MCNC: 7.2 G/DL (ref 6–8.4)

## 2024-11-21 PROCEDURE — 99999 PR PBB SHADOW E&M-EST. PATIENT-LVL II: CPT | Mod: PBBFAC,,, | Performed by: PODIATRIST

## 2024-11-21 PROCEDURE — 80076 HEPATIC FUNCTION PANEL: CPT | Performed by: PODIATRIST

## 2024-11-21 PROCEDURE — 36415 COLL VENOUS BLD VENIPUNCTURE: CPT | Mod: PO | Performed by: PODIATRIST

## 2024-11-21 RX ORDER — DEXAMETHASONE SODIUM PHOSPHATE 4 MG/ML
2 INJECTION, SOLUTION INTRA-ARTICULAR; INTRALESIONAL; INTRAMUSCULAR; INTRAVENOUS; SOFT TISSUE
Status: COMPLETED | OUTPATIENT
Start: 2024-11-21 | End: 2024-11-21

## 2024-11-21 RX ORDER — LIDOCAINE HYDROCHLORIDE 10 MG/ML
1 INJECTION, SOLUTION INFILTRATION; PERINEURAL
Status: COMPLETED | OUTPATIENT
Start: 2024-11-21 | End: 2024-11-21

## 2024-11-21 RX ORDER — TRIAMCINOLONE ACETONIDE 40 MG/ML
20 INJECTION, SUSPENSION INTRA-ARTICULAR; INTRAMUSCULAR ONCE
Status: COMPLETED | OUTPATIENT
Start: 2024-11-21 | End: 2024-11-21

## 2024-11-21 RX ADMIN — DEXAMETHASONE SODIUM PHOSPHATE 2 MG: 4 INJECTION, SOLUTION INTRA-ARTICULAR; INTRALESIONAL; INTRAMUSCULAR; INTRAVENOUS; SOFT TISSUE at 07:11

## 2024-11-21 RX ADMIN — LIDOCAINE HYDROCHLORIDE 1 ML: 10 INJECTION, SOLUTION INFILTRATION; PERINEURAL at 07:11

## 2024-11-21 RX ADMIN — TRIAMCINOLONE ACETONIDE 20 MG: 40 INJECTION, SUSPENSION INTRA-ARTICULAR; INTRAMUSCULAR at 07:11

## 2024-11-22 ENCOUNTER — TELEPHONE (OUTPATIENT)
Dept: PODIATRY | Facility: CLINIC | Age: 79
End: 2024-11-22
Payer: MEDICARE

## 2024-11-22 DIAGNOSIS — B35.1 ONYCHOMYCOSIS DUE TO DERMATOPHYTE: Primary | ICD-10-CM

## 2024-11-22 RX ORDER — TERBINAFINE HYDROCHLORIDE 250 MG/1
250 TABLET ORAL DAILY
Qty: 30 TABLET | Refills: 0 | Status: SHIPPED | OUTPATIENT
Start: 2024-11-22 | End: 2024-12-22

## 2024-11-22 NOTE — TELEPHONE ENCOUNTER
----- Message from Mikhail Mcmullen DPM sent at 11/22/2024 12:54 PM CST -----  Please let him know liver enzymes were normal.  I've sent lamisil to the pharmacy.  ----- Message -----  From: [facility]  Sent: 11/21/2024  11:33 PM CST  To: Mikhail Mcmullen DPM

## 2024-11-22 NOTE — PROGRESS NOTES
Subjective:     Patient ID: Eric Buitrago is a 79 y.o. male.    Chief Complaint: Foot Pain (Left foot wants injection)  Patient presents to clinic for a follow up regarding Lt. Posterior heel pain.  Being that the most recent MRI was negative for a tear of the Achilles tendon, he is amenable to a steroid injection.  Rates pain from the heel as a 6/10.  Symptoms are aggravated now with all weight bearing.  Symptoms are alleviated with rest.   He continues wearing supportive shoes and is stretching as instructed.  Denies any additional pedal complaints.     Past Medical History:   Diagnosis Date    Allergy     Hyperlipidemia     Hypertension     Hypothyroid     IBS (irritable bowel syndrome)        Past Surgical History:   Procedure Laterality Date    CHOLECYSTECTOMY      COLONOSCOPY  ~    RABITO.    COLONOSCOPY N/A 2018    Procedure: COLONOSCOPY;  Surgeon: Josue Myles Jr., MD;  [facility];  Service: Endoscopy;  Laterality: N/A;       Family History   Problem Relation Name Age of Onset    Lymphoma Mother      Stroke Father      Breast cancer Sister      Leukemia Brother      Glaucoma Neg Hx      Macular degeneration Neg Hx      Retinal detachment Neg Hx         Social History     Socioeconomic History    Marital status:    Occupational History    Occupation: retired school psycholgist   Tobacco Use    Smoking status: Former     Current packs/day: 0.00     Average packs/day: 0.3 packs/day for 10.0 years (2.5 ttl pk-yrs)     Types: Cigarettes     Start date: 1973     Quit date: 1983     Years since quittin.4    Smokeless tobacco: Never   Substance and Sexual Activity    Alcohol use: Yes     Comment: occ    Drug use: No    Sexual activity: Not Currently     Social Drivers of Health     Financial Resource Strain: Low Risk  (2024)    Overall Financial Resource Strain (CARDIA)     Difficulty of Paying Living Expenses: Not hard at all   Food Insecurity: No Food  Insecurity (7/14/2024)    Hunger Vital Sign     Worried About Running Out of Food in the Last Year: Never true     Ran Out of Food in the Last Year: Never true   Transportation Needs: No Transportation Needs (11/4/2021)    PRAPARE - Transportation     Lack of Transportation (Medical): No     Lack of Transportation (Non-Medical): No   Physical Activity: Sufficiently Active (7/14/2024)    Exercise Vital Sign     Days of Exercise per Week: 3 days     Minutes of Exercise per Session: 60 min   Stress: No Stress Concern Present (7/14/2024)    [facility] [employer] - Occupational Stress Questionnaire     Feeling of Stress : Only a little   Housing Stability: Unknown (10/8/2020)    Housing Stability Vital Sign     Unable to Pay for Housing in the Last Year: No     Unstable Housing in the Last Year: No       Current Outpatient Medications   Medication Sig Dispense Refill    amLODIPine (NORVASC) 5 MG tablet TAKE 1 TABLET BY MOUTH EVERY MORNING AND 1/2 TABLET EVERY EVENING 135 tablet 2    azelastine (ASTELIN) 137 mcg (0.1 %) nasal spray SPRAY 1 SPRAY IN EACH NOSTRIL TWICE DAILY 90 mL 3    diclofenac sodium (VOLTAREN) 1 % Gel Apply 2 g topically 2 (two) times daily. 100 g 4    doxepin (SINEQUAN) 50 MG capsule TAKE TWO CAPSULES BY MOUTH ONCE IN THE EVENING. 180 capsule 1    fexofenadine (ALLEGRA) 60 MG tablet Take 1 tablet by mouth.       gabapentin (NEURONTIN) 300 MG capsule TAKE 1 CAPSULE(300 MG) BY MOUTH EVERY EVENING 90 capsule 2    levothyroxine (SYNTHROID) 137 MCG Tab tablet TAKE 1 TABLET(137 MCG) BY MOUTH BEFORE BREAKFAST 90 tablet 3    losartan (COZAAR) 50 MG tablet TAKE 1 TABLET(50 MG) BY MOUTH EVERY DAY 90 tablet 1    meloxicam (MOBIC) 15 MG tablet TAKE 1 TABLET(15 MG) BY MOUTH EVERY DAY 90 tablet 3    methylPREDNISolone (MEDROL DOSEPACK) 4 mg tablet use as directed (Patient not taking: Reported on 7/15/2024) 1 each 0    rosuvastatin (CRESTOR) 5 MG tablet TAKE 1 TABLET(5 MG) BY MOUTH EVERY DAY 90  tablet 3    tamsulosin (FLOMAX) 0.4 mg Cap Take 2 capsules (0.8 mg total) by mouth every evening. 180 capsule 1    temazepam (RESTORIL) 15 mg Cap Take 1 capsule (15 mg total) by mouth every night at bedtime. 90 capsule 1    terbinafine HCL (LAMISIL) 250 mg tablet Take 1 tablet (250 mg total) by mouth once daily. 30 tablet 0     No current facility-administered medications for this visit.       Review of patient's allergies indicates:   Allergen Reactions    No known drug allergies         Hemoglobin A1C   Date Value Ref Range Status   08/15/2024 4.8 4.0 - 5.6 % Final     Comment:     ADA Screening Guidelines:  5.7-6.4%  Consistent with prediabetes  >or=6.5%  Consistent with diabetes    High levels of fetal hemoglobin interfere with the HbA1C  assay. Heterozygous hemoglobin variants (HbS, HgC, etc)do  not significantly interfere with this assay.   However, presence of multiple variants may affect accuracy.     12/14/2017 4.5 4.0 - 5.6 % Final     Comment:     According to ADA guidelines, hemoglobin A1c <7.0% represents  optimal control in non-pregnant diabetic patients. Different  metrics may apply to specific patient populations.   Standards of Medical Care in Diabetes-2016.  For the purpose of screening for the presence of diabetes:  <5.7%     Consistent with the absence of diabetes  5.7-6.4%  Consistent with increasing risk for diabetes   (prediabetes)  >or=6.5%  Consistent with diabetes  Currently, no consensus exists for use of hemoglobin A1c  for diagnosis of diabetes for children.  This Hemoglobin A1c assay has significant interference with fetal   hemoglobin   (HbF). The results are invalid for patients with abnormal amounts of   HbF,   including those with known Hereditary Persistence   of Fetal Hemoglobin. Heterozygous hemoglobin variants (HbAS, HbAC,   HbAD, HbAE, HbA2) do not significantly interfere with this assay;   however, presence of multiple variants in a sample may impact the %   interference.      02/14/2011 4.7 4.0 - 6.2 % Final       Review of Systems   Constitutional: Negative for chills and fever.   Cardiovascular:  Negative for claudication and leg swelling.   Skin:  Positive for color change and nail changes.   Musculoskeletal:  Positive for myalgias. Negative for joint pain, joint swelling, muscle cramps and muscle weakness.   Gastrointestinal:  Negative for nausea and vomiting.   Neurological:  Negative for numbness and paresthesias.   Psychiatric/Behavioral:  Negative for altered mental status.         Objective:     Physical Exam  Constitutional:       Appearance: Normal appearance. He is not ill-appearing.   Cardiovascular:      Pulses:           Dorsalis pedis pulses are 2+ on the right side and 2+ on the left side.        Posterior tibial pulses are 2+ on the right side and 2+ on the left side.      Comments: CFT is < 3 seconds bilateral.  Pedal hair growth is present bilateral.  No lower extremity edema noted bilateral.  Toes are warm to touch bilateral.    Musculoskeletal:         General: Tenderness present. No deformity or signs of injury.      Right lower leg: No edema.      Left lower leg: No edema.      Comments: Muscle strength 5/5 in all muscle groups bilateral.  No tenderness nor crepitation with ROM of foot/ankle joints bilateral.  (+) for pain with palpation to the bursa overlying the Lt. Retrocalcaneal exostosis.  Lt. Sided gastrocnemius equinus with significant improvement in dorsiflexion.  (+) for pain with palpation to the Lt. Achilles tendon at its insertion.  (-) dell or defect noted to said tendon.     Skin:     General: Skin is warm and dry.      Capillary Refill: Capillary refill takes 2 to 3 seconds.      Findings: No bruising, ecchymosis, erythema, signs of injury, laceration, lesion, petechiae, rash or wound.      Comments: Pedal skin has normal turgor, temperature, and texture bilateral.  Significant mycotic changes noted to the Lt. Hallux toenail.  Remaining toenails x  "9 appear normotrophic.    Neurological:      General: No focal deficit present.      Mental Status: He is alert.      Sensory: No sensory deficit.      Motor: No weakness or atrophy.      Comments: Light touch is intact bilateral.             Assessment:      Encounter Diagnoses   Name Primary?    Onychomycosis due to dermatophyte Yes    Postcalcaneal bursitis of left foot      Plan:     Eric Razo" was seen today for foot pain.    Diagnoses and all orders for this visit:    Onychomycosis due to dermatophyte  -     Hepatic Function Panel; Future    Postcalcaneal bursitis of left foot  -     LIDOcaine HCL 10 mg/ml (1%) injection 1 mL  -     dexAMETHasone injection 2 mg  -     triamcinolone acetonide injection 20 mg      I counseled the patient on his conditions, their implications and medical management.    - Reviewed the results of the MRI, which as negative for a Lt. Sided Achilles tendon tear.    - After sterilizing the area with an alcohol prep and applying ethyl chloride, the affected area of the Lt. Retrocalcaneal bursa was injected as per MAR.  The patient tolerated the injection well, with minimal blood loss noted from the injection site.  The injection site was then covered with a bandage.  Patient tolerated this quite well.        - To continue performing stretching exercises to address bilateral equinus.     - Recommend wearing supportive shoes only.  Discussed avoidance of barefoot walking, flip flops, and Crocs, as this will exacerbate current symptoms.      - Discussed avoidance of high impact activities such as squatting, stooping, and running as these activities will exacerbate symptoms.       - May consider applying a topical analgesic (Voltaren cream) to help with pain symptoms.      - RTC prn.     Mikhail Mcmullen DPM   "

## 2024-12-01 DIAGNOSIS — K58.9 IRRITABLE BOWEL SYNDROME, UNSPECIFIED TYPE: ICD-10-CM

## 2024-12-01 NOTE — TELEPHONE ENCOUNTER
No care due was identified.  [facility] Embedded Care Due Messages. Reference number: [id number].   12/01/2024 3:23:53 AM CST

## 2024-12-01 NOTE — TELEPHONE ENCOUNTER
Refill Routing Note   Medication(s) are not appropriate for processing by [facility] for the following reason(s):        Drug-disease interaction: Drug-Disease: doxepin and Benign prostatic hyperplasia     [facility] action(s):  Defer             Appointments  past 12m or future 3m with PCP    Date Provider   Last Visit   7/15/2024 Maddi Gastelum, DO   Next Visit   1/16/2025 Maddi Gastelum, DO   ED visits in past 90 days: 0        Note composed:5:12 PM 12/01/2024

## 2024-12-02 RX ORDER — DOXEPIN HYDROCHLORIDE 50 MG/1
CAPSULE ORAL
Qty: 180 CAPSULE | Refills: 2 | Status: SHIPPED | OUTPATIENT
Start: 2024-12-02

## 2024-12-14 NOTE — TELEPHONE ENCOUNTER
No care due was identified.  [facility] Embedded Care Due Messages. Reference number: [id number].   12/14/2024 3:23:23 AM CST

## 2024-12-15 RX ORDER — LOSARTAN POTASSIUM 50 MG/1
TABLET ORAL
Qty: 90 TABLET | Refills: 1 | Status: SHIPPED | OUTPATIENT
Start: 2024-12-15

## 2024-12-15 NOTE — TELEPHONE ENCOUNTER
Refill Decision Note   Eric Buitrago  is requesting a refill authorization.  Brief Assessment and Rationale for Refill:  Approve     Medication Therapy Plan:         Comments:     Note composed:7:49 AM 12/15/2024

## 2024-12-16 DIAGNOSIS — F51.01 PRIMARY INSOMNIA: ICD-10-CM

## 2024-12-16 RX ORDER — TEMAZEPAM 15 MG/1
15 CAPSULE ORAL NIGHTLY
Qty: 90 CAPSULE | Refills: 1 | Status: SHIPPED | OUTPATIENT
Start: 2024-12-16

## 2024-12-16 RX ORDER — TEMAZEPAM 15 MG/1
15 CAPSULE ORAL NIGHTLY
Qty: 90 CAPSULE | Refills: 1 | Status: CANCELLED | OUTPATIENT
Start: 2024-12-16

## 2024-12-16 NOTE — TELEPHONE ENCOUNTER
No care due was identified.  [facility] Embedded Care Due Messages. Reference number: [id number].   12/16/2024 10:10:19 AM CST

## 2024-12-26 RX ORDER — GABAPENTIN 300 MG/1
300 CAPSULE ORAL NIGHTLY
Qty: 90 CAPSULE | Refills: 2 | Status: SHIPPED | OUTPATIENT
Start: 2024-12-26

## 2024-12-26 NOTE — TELEPHONE ENCOUNTER
No care due was identified.  [facility] Embedded Care Due Messages. Reference number: [id number].   12/26/2024 11:17:54 AM CST

## 2024-12-30 ENCOUNTER — TELEPHONE (OUTPATIENT)
Dept: PODIATRY | Facility: CLINIC | Age: 79
End: 2024-12-30
Payer: MEDICARE

## 2024-12-30 DIAGNOSIS — B35.1 ONYCHOMYCOSIS DUE TO DERMATOPHYTE: Primary | ICD-10-CM

## 2024-12-30 RX ORDER — TERBINAFINE HYDROCHLORIDE 250 MG/1
250 TABLET ORAL DAILY
Qty: 30 TABLET | Refills: 0 | Status: SHIPPED | OUTPATIENT
Start: 2024-12-30 | End: 2025-01-29

## 2024-12-30 NOTE — TELEPHONE ENCOUNTER
----- Message from Inna sent at 12/30/2024 10:35 AM CST -----  Contact: Self  Type:  RX Refill Request    Who Called:  Patient  Refill or New Rx:  Refill  RX Name and Strength:  terbinafine HCL (LAMISIL) 250 mg tablet  How is the patient currently taking it? (ex. 1XDay):  AS Directed  Is this a 30 day or 90 day RX:  30  Preferred Pharmacy with phone number:     [id number] [address] [facility]  [address] [place] [id number]  Phone: [phone] Fax: [phone]  Local or Mail Order:  Local  Ordering Provider:  Dr Benedict Daniels Call Back Number:  [phone]   Additional Information:  Thank You

## 2025-01-09 ENCOUNTER — TELEPHONE (OUTPATIENT)
Dept: PODIATRY | Facility: CLINIC | Age: 80
End: 2025-01-09
Payer: MEDICARE

## 2025-01-09 NOTE — TELEPHONE ENCOUNTER
----- Message from Zigmo sent at 1/9/2025 10:26 AM CST -----  Regarding: Sooner Appointment Request  Contact: patient at [phone]  Type:  Sooner Appointment Request    Caller is requesting a sooner appointment.  Caller declined first available appointment listed below.  Caller will not accept being placed on the waitlist and is requesting a message be sent to doctor.    Name of Caller:  patient at [phone]  When is the first available appointment?  February 13  Symptoms:  patient states he needs a shot in his heel  Would the patient rather a call back or a response via [facility]? Call back    Additional Information:  Patient is wanting a appointment at the end of January. Please call and advise thank you .   PA NOTE-MEDICINE    Called by RN due to Pt HR decreasing to high 40's with return to baseline within a few seconds.  Pt has + h/o BEATRICE.  1:1 staff states that Pt was going in and out of sleep during same time as HR decreased.    Pt Denies: CP SOB N/V Dizziness     71yo F with PMHx of HFrED, substance abuse, HTN, DM admitted for decompensated HF requiring AVAPs course complicated by acute toxic metabolic encephalopathy requiring multiple doses Narcan with transient improvement then pt was RRT on 4/17 for worsening mentation/obtunded with worsening hypoxic respiratory failure upgraded to MICU for Narcan gtt clinical concerns for  long-acting synthetic opioid abuse.  Pt now downgraded to Medical Floor.    T(C): 36.4 (19 Apr 2023 00:33), Max: 37 (18 Apr 2023 13:29)  T(F): 97.5 (19 Apr 2023 00:33), Max: 98.6 (18 Apr 2023 13:29)  HR: 73 (19 Apr 2023 00:33) (70 - 139)  BP: 128/80 (19 Apr 2023 00:33) (99/51 - 133/69)  BP(mean): 82 (18 Apr 2023 12:00) (67 - 90)  RR: 18 (19 Apr 2023 00:33) (17 - 29)  SpO2: 98% (19 Apr 2023 00:33) (91% - 100%) 2 L nc    General: WD Obese Female Resting in Bed NAD O2nc in Place No Complaints   Cardiac: S1S2 + Irreg   Lungs: CTA B/L A-B  Integument: No ashiness/Pallor warm/dry     A/P Eval Pt who experienced a few episodes of HR momentarily dropping to high 40's then returning to Baseline while asleep   Pt asymptomatic  EKG-Stat  Sinus Arrythmia @ 64 No Acute new Findings (Unofficial)  BMP Mag/Phos for AM   Cardiology already following   Continue to Monitor Pt  Recall PA for any reoccurrence or for any changes in Pt status   Will sign out to AM Team to Follow RAPID RESPONSE FOLLOW UP NOTE.    Patient seen and examined at bedside. Patient minimally responsive with verbal and tactile stimuli.  Patient on AVAPS.  Patient had episode of convulsive movements, lasting <1min.  MICU at bedside. Repeat ABG drawn by MICU.  Patient received additional dose of Narcan with no improvement.      PHYSICAL EXAM ----------  Vital Signs Last 24 Hrs  T(C): 36.4 (2023 04:53), Max: 36.6 (2023 15:57)  T(F): 97.5 (2023 04:53), Max: 97.8 (2023 15:57)  HR: 75 (2023 08:00) (67 - 80)  BP: 92/58 (2023 08:00) (92/58 - 148/77)  BP(mean): --  RR: 13 (2023 08:00) (13 - 26)  SpO2: 98% (2023 08:00) (92% - 100%)    Parameters below as of 2023 08:00  Patient On (Oxygen Delivery Method): BiPAP/CPAP    PHYSICAL EXAM:  GENERAL: NAD  HEAD:  Atraumatic, Normocephalic  EYES: Pupils pinpoint   NECK: Supple, No JVD, Normal thyroid  NERVOUS SYSTEM:  Lethargic, moving extremities intermittently  CHEST/LUNG: Diminished BS bilaterally  HEART: Regular rate and rhythm; No murmurs, rubs, or gallops  ABDOMEN: Soft, Nontender, Nondistended; Bowel sounds present  EXTREMITIES:  2+ Peripheral Pulses, Bilateral LE edema        I&O's Summary    2023 07:01  -  2023 07:00  --------------------------------------------------------  IN: 0 mL / OUT: 8000 mL / NET: -8000 mL        LABS ----------                        7.2    6.56  )-----------( 247      ( 2023 06:20 )             27.3       04-17    146<H>  |  105  |  10.4  ----------------------------<  168<H>  3.8   |  31.0<H>  |  0.65    Ca    8.2<L>      2023 06:20  Phos  3.3     -  Mg     1.7         TPro  6.0<L>  /  Alb  2.8<L>  /  TBili  1.2  /  DBili  x   /  AST  11  /  ALT  12  /  AlkPhos  308<H>        LIVER FUNCTIONS - ( 2023 06:20 )  Alb: 2.8 g/dL / Pro: 6.0 g/dL / ALK PHOS: 308 U/L / ALT: 12 U/L / AST: 11 U/L / GGT: x             CAPILLARY BLOOD GLUCOSE  POCT Blood Glucose.: 199 mg/dL (2023 08:27)  POCT Blood Glucose.: 172 mg/dL (2023 06:56)  POCT Blood Glucose.: 147 mg/dL (2023 18:55)  POCT Blood Glucose.: 158 mg/dL (2023 16:51)  POCT Blood Glucose.: 164 mg/dL (2023 08:42)          Urinalysis Basic - ( 2023 09:30 )    Color: Yellow / Appearance: Clear / S.005 / pH: x  Gluc: x / Ketone: Negative  / Bili: Negative / Urobili: Negative mg/dL   Blood: x / Protein: Negative / Nitrite: Negative   Leuk Esterase: Negative / RBC: 0-2 /HPF / WBC 0-2 /HPF   Sq Epi: x / Non Sq Epi: x / Bacteria: Occasional             ABG - ( 2023 06:00 )  pH, Arterial: 7.380 pH, Blood: x     /  pCO2: 52    /  pO2: 119   / HCO3: 31    / Base Excess: 5.7   /  SaO2: 98.2          CARDIAC MARKERS ( 2023 08:52 )  x     / 0.03 ng/mL / x     / x     / x      CARDIAC MARKERS ( 2023 03:10 )  x     / 0.03 ng/mL / x     / x     / x            ASSESSMENT/ PLAN   69 y/o F w/ PMH of HFrEF (EF 25% in ), substance abuse, HTN, HLD, DMII, came in c/o sob and leg swelling. Admitted for CHFAE. Initially on NIV , weaned off to nasal canula after diuresis and improvement in respiratory status. ICU was consulted and agreed w/ management and no MICU admission at that time. Additionally, admitting labs revealed utox + for cocaine.     RRT called this morning for unresponsiveness. Pt seen and examined at bedside. Pt laying in bed, obtunded. Non responsive to painful or verbal stimuli. Vitals stable (/73, , Spo2 97% on 3L NC) and . Given pt with known substance abuse hx, narcan was given twice with improvement in mentation. After second dose of narcan, patients eyes opened and tracking. Patient seen now for follow up with MICU at bedside.  Repeat ABG drawn on AVAPS.  Patient minimally responsive.  Additional Narcan given with no response.  Patient to be transferred to MICU for Narcan drip and further management.          DISPOSITION:  - Transfer to MICU

## 2025-01-09 NOTE — TELEPHONE ENCOUNTER
Spoke with the patient who requested an earlier appointment. Scheduled first available appointment per provider's request for 01/22/25 @ 10:40 am. He expressed thanks & understanding of the message.

## 2025-01-10 DIAGNOSIS — N40.0 BENIGN NON-NODULAR PROSTATIC HYPERPLASIA WITHOUT LOWER URINARY TRACT SYMPTOMS: ICD-10-CM

## 2025-01-10 RX ORDER — TAMSULOSIN HYDROCHLORIDE 0.4 MG/1
CAPSULE ORAL
Qty: 180 CAPSULE | Refills: 1 | Status: SHIPPED | OUTPATIENT
Start: 2025-01-10

## 2025-01-10 NOTE — TELEPHONE ENCOUNTER
Refill Decision Note   Eric Buitrago  is requesting a refill authorization.    Brief Assessment and Rationale for Refill:   Approve       Medication Therapy Plan:         Comments:     Note composed:8:32 AM 01/10/2025            [FreeTextEntry1] : FLCR 431 (3/31/20)

## 2025-01-10 NOTE — TELEPHONE ENCOUNTER
No care due was identified.  [facility] Embedded Care Due Messages. Reference number: [id number].   1/10/2025 3:29:22 AM CST

## 2025-01-14 ENCOUNTER — LAB VISIT (OUTPATIENT)
Dept: LAB | Facility: HOSPITAL | Age: 80
End: 2025-01-14
Attending: INTERNAL MEDICINE
Payer: MEDICARE

## 2025-01-14 DIAGNOSIS — E03.9 HYPOTHYROIDISM, UNSPECIFIED TYPE: ICD-10-CM

## 2025-01-14 DIAGNOSIS — R73.09 ELEVATED GLUCOSE: ICD-10-CM

## 2025-01-14 DIAGNOSIS — I10 ESSENTIAL HYPERTENSION: ICD-10-CM

## 2025-01-14 LAB
ALBUMIN SERPL BCP-MCNC: 4.2 G/DL (ref 3.5–5.2)
ALP SERPL-CCNC: 59 U/L (ref 40–150)
ALT SERPL W/O P-5'-P-CCNC: 25 U/L (ref 10–44)
ANION GAP SERPL CALC-SCNC: 8 MMOL/L (ref 8–16)
AST SERPL-CCNC: 18 U/L (ref 10–40)
BILIRUB SERPL-MCNC: 0.7 MG/DL (ref 0.1–1)
BUN SERPL-MCNC: 19 MG/DL (ref 8–23)
CALCIUM SERPL-MCNC: 9.2 MG/DL (ref 8.7–10.5)
CHLORIDE SERPL-SCNC: 106 MMOL/L (ref 95–110)
CO2 SERPL-SCNC: 25 MMOL/L (ref 23–29)
CREAT SERPL-MCNC: 0.9 MG/DL (ref 0.5–1.4)
EST. GFR  (NO RACE VARIABLE): >60 ML/MIN/1.73 M^2
ESTIMATED AVG GLUCOSE: 88 MG/DL (ref 68–131)
GLUCOSE SERPL-MCNC: 109 MG/DL (ref 70–110)
HBA1C MFR BLD: 4.7 % (ref 4–5.6)
POTASSIUM SERPL-SCNC: 4.5 MMOL/L (ref 3.5–5.1)
PROT SERPL-MCNC: 7.3 G/DL (ref 6–8.4)
SODIUM SERPL-SCNC: 139 MMOL/L (ref 136–145)
T4 FREE SERPL-MCNC: 1.06 NG/DL (ref 0.71–1.51)
TSH SERPL DL<=0.005 MIU/L-ACNC: 4.22 UIU/ML (ref 0.4–4)

## 2025-01-14 PROCEDURE — 84439 ASSAY OF FREE THYROXINE: CPT | Performed by: INTERNAL MEDICINE

## 2025-01-14 PROCEDURE — 84443 ASSAY THYROID STIM HORMONE: CPT | Performed by: INTERNAL MEDICINE

## 2025-01-14 PROCEDURE — 36415 COLL VENOUS BLD VENIPUNCTURE: CPT | Mod: PN | Performed by: INTERNAL MEDICINE

## 2025-01-14 PROCEDURE — 80053 COMPREHEN METABOLIC PANEL: CPT | Performed by: INTERNAL MEDICINE

## 2025-01-14 PROCEDURE — 83036 HEMOGLOBIN GLYCOSYLATED A1C: CPT | Performed by: INTERNAL MEDICINE

## 2025-01-16 ENCOUNTER — OFFICE VISIT (OUTPATIENT)
Dept: FAMILY MEDICINE | Facility: CLINIC | Age: 80
End: 2025-01-16
Payer: MEDICARE

## 2025-01-16 ENCOUNTER — TELEPHONE (OUTPATIENT)
Dept: FAMILY MEDICINE | Facility: CLINIC | Age: 80
End: 2025-01-16

## 2025-01-16 VITALS
BODY MASS INDEX: 24.97 KG/M2 | DIASTOLIC BLOOD PRESSURE: 62 MMHG | WEIGHT: 178.38 LBS | HEART RATE: 96 BPM | HEIGHT: 71 IN | SYSTOLIC BLOOD PRESSURE: 126 MMHG | RESPIRATION RATE: 18 BRPM | TEMPERATURE: 98 F | OXYGEN SATURATION: 98 %

## 2025-01-16 DIAGNOSIS — I10 ESSENTIAL HYPERTENSION: Primary | ICD-10-CM

## 2025-01-16 DIAGNOSIS — Z23 NEED FOR COVID-19 VACCINE: ICD-10-CM

## 2025-01-16 DIAGNOSIS — M76.62 TENDONITIS, ACHILLES, LEFT: ICD-10-CM

## 2025-01-16 DIAGNOSIS — E03.9 HYPOTHYROIDISM, UNSPECIFIED TYPE: ICD-10-CM

## 2025-01-16 DIAGNOSIS — H91.93 BILATERAL HEARING LOSS, UNSPECIFIED HEARING LOSS TYPE: ICD-10-CM

## 2025-01-16 DIAGNOSIS — E78.5 HYPERLIPIDEMIA, UNSPECIFIED HYPERLIPIDEMIA TYPE: ICD-10-CM

## 2025-01-16 DIAGNOSIS — N40.0 BENIGN NON-NODULAR PROSTATIC HYPERPLASIA WITHOUT LOWER URINARY TRACT SYMPTOMS: ICD-10-CM

## 2025-01-16 DIAGNOSIS — J30.9 CHRONIC ALLERGIC RHINITIS: ICD-10-CM

## 2025-01-16 DIAGNOSIS — M47.816 SPONDYLOSIS OF LUMBAR REGION WITHOUT MYELOPATHY OR RADICULOPATHY: ICD-10-CM

## 2025-01-16 DIAGNOSIS — M19.041 PRIMARY OSTEOARTHRITIS OF BOTH HANDS: ICD-10-CM

## 2025-01-16 DIAGNOSIS — F51.01 PRIMARY INSOMNIA: ICD-10-CM

## 2025-01-16 DIAGNOSIS — Z12.11 SCREEN FOR COLON CANCER: ICD-10-CM

## 2025-01-16 DIAGNOSIS — M19.042 PRIMARY OSTEOARTHRITIS OF BOTH HANDS: ICD-10-CM

## 2025-01-16 DIAGNOSIS — M47.22 OSTEOARTHRITIS OF SPINE WITH RADICULOPATHY, CERVICAL REGION: ICD-10-CM

## 2025-01-16 DIAGNOSIS — K58.9 IRRITABLE BOWEL SYNDROME, UNSPECIFIED TYPE: ICD-10-CM

## 2025-01-16 PROCEDURE — 91322 SARSCOV2 VAC 50 MCG/0.5ML IM: CPT | Mod: S$GLB,,, | Performed by: INTERNAL MEDICINE

## 2025-01-16 PROCEDURE — 3078F DIAST BP <80 MM HG: CPT | Mod: CPTII,S$GLB,, | Performed by: INTERNAL MEDICINE

## 2025-01-16 PROCEDURE — 3074F SYST BP LT 130 MM HG: CPT | Mod: CPTII,S$GLB,, | Performed by: INTERNAL MEDICINE

## 2025-01-16 PROCEDURE — 1126F AMNT PAIN NOTED NONE PRSNT: CPT | Mod: CPTII,S$GLB,, | Performed by: INTERNAL MEDICINE

## 2025-01-16 PROCEDURE — 1160F RVW MEDS BY RX/DR IN RCRD: CPT | Mod: CPTII,S$GLB,, | Performed by: INTERNAL MEDICINE

## 2025-01-16 PROCEDURE — 90480 ADMN SARSCOV2 VAC 1/ONLY CMP: CPT | Mod: S$GLB,,, | Performed by: INTERNAL MEDICINE

## 2025-01-16 PROCEDURE — 1159F MED LIST DOCD IN RCRD: CPT | Mod: CPTII,S$GLB,, | Performed by: INTERNAL MEDICINE

## 2025-01-16 PROCEDURE — G2211 COMPLEX E/M VISIT ADD ON: HCPCS | Mod: S$GLB,,, | Performed by: INTERNAL MEDICINE

## 2025-01-16 PROCEDURE — 99214 OFFICE O/P EST MOD 30 MIN: CPT | Mod: S$GLB,,, | Performed by: INTERNAL MEDICINE

## 2025-01-16 RX ORDER — AMLODIPINE BESYLATE 2.5 MG/1
TABLET ORAL
Qty: 90 TABLET | Refills: 3 | Status: SHIPPED | OUTPATIENT
Start: 2025-01-16

## 2025-01-16 RX ORDER — AMLODIPINE BESYLATE 5 MG/1
5 TABLET ORAL DAILY
Qty: 90 TABLET | Refills: 3 | Status: SHIPPED | OUTPATIENT
Start: 2025-01-16

## 2025-01-16 NOTE — TELEPHONE ENCOUNTER
----- Message from Currently sent at 1/16/2025 12:55 PM CST -----  Type: Needs Medical Advice  Who Called:  anasteph     Jeremiah Call Back Number: [phone]  Additional Information: [facility] says there was 2 scripts sent over and wasn't sure which one to fill , please call to further assist thank you .

## 2025-01-16 NOTE — TELEPHONE ENCOUNTER
Spoke with Soraya at   pharmacy. Clarified pt is to take Amlodipin 5 mg in AM and 2.5 mg in the evening. Will just need to fill the 2.5 mg at this time. Verbalized understanding.

## 2025-01-16 NOTE — PROGRESS NOTES
Subjective:       Patient ID: Eric Buitrago is a 79 y.o. male.    Medication List with Changes/Refills   New Medications    AMLODIPINE (NORVASC) 2.5 MG TABLET    Take 1 pill at night   Current Medications    AZELASTINE (ASTELIN) 137 MCG (0.1 %) NASAL SPRAY    SPRAY 1 SPRAY IN EACH NOSTRIL TWICE DAILY    DOXEPIN (SINEQUAN) 50 MG CAPSULE    TAKE 2 CAPSULES BY MOUTH 1 TIME IN THE EVENING    FEXOFENADINE (ALLEGRA) 60 MG TABLET    Take 1 tablet by mouth.     GABAPENTIN (NEURONTIN) 300 MG CAPSULE    TAKE 1 CAPSULE(300 MG) BY MOUTH EVERY EVENING    LEVOTHYROXINE (SYNTHROID) 137 MCG TAB TABLET    TAKE 1 TABLET(137 MCG) BY MOUTH BEFORE BREAKFAST    LOSARTAN (COZAAR) 50 MG TABLET    TAKE 1 TABLET(50 MG) BY MOUTH EVERY DAY    MELOXICAM (MOBIC) 15 MG TABLET    TAKE 1 TABLET(15 MG) BY MOUTH EVERY DAY    ROSUVASTATIN (CRESTOR) 5 MG TABLET    TAKE 1 TABLET(5 MG) BY MOUTH EVERY DAY    TAMSULOSIN (FLOMAX) 0.4 MG CAP    TAKE 2 CAPSULES(0.8 MG) BY MOUTH EVERY EVENING    TEMAZEPAM (RESTORIL) 15 MG CAP    Take 1 capsule (15 mg total) by mouth every night at bedtime.    TERBINAFINE HCL (LAMISIL) 250 MG TABLET    Take 1 tablet (250 mg total) by mouth once daily.   Changed and/or Refilled Medications    Modified Medication Previous Medication    AMLODIPINE (NORVASC) 5 MG TABLET amLODIPine (NORVASC) 5 MG tablet       Take 1 tablet (5 mg total) by mouth once daily.    TAKE 1 TABLET BY MOUTH EVERY MORNING AND 1/2 TABLET EVERY EVENING   Discontinued Medications    DICLOFENAC SODIUM (VOLTAREN) 1 % GEL    Apply 2 g topically 2 (two) times daily.    METHYLPREDNISOLONE (MEDROL DOSEPACK) 4 MG TABLET    use as directed       Chief Complaint: Follow-up (6 month follow up )  He is here today to f/u on chronic medical issues.         He has hypertension and is taking losartan 50 mg qday and amlodipine 5 mg in the am and 2.5 mg in the pm. He denies any CAD. He denies chest pain or shortness of breath. Home BP run 130s/70s.     He has hyperlipidemia  and is taking crestor 5 mg qday. His lipids on 7/2024 were 123/78/39/68.      He has hypothyroid that is controlled on levothyroxine 137 mcg qday. His last TSH was slow on 1/2025 at 4.2 (it was normal on 8/2024).        He has BPH which is controlled with flomax 0.8 mg nightly. He says this helps with his frequency and nocturia. PSA on 7/2024 was 2.0.       He has seasonal allergies controlled with allegra as needed and astelin which he takes daily.  He denies any active symptoms.         He has IBS for many years. He was seen by GI about 10 years ago and started on doxepin for spastic colon.  He says this has worked and he no longer has any symptoms.  His most recent colonoscopy on 2/2018 had no polyps but did show mild colonic spasms. He continues on doxepin to 100 mg daily and feels his symptoms are controlled on this dose.      He has insomnia that is controlled with PRN temazepam 15 mg 1/2 pill to 1 pill nightly.  He says it works and denies side effects.          He occasionally has low back pain if he over exerts himself. He will take mobic with good relief.  He is pain free today. Pain worse with bending or lifting.  Better with stretching and rest.       He has shoulder pain with intermittent radiation down left arm. He was seen by pain management and had an MRI on 1/2019 showing Multilevel spondylosis, greatest at C5-6 where there is mild cord flattening with effacement of ventral and dorsal CSF.  No cord signal abnormality.Multilevel foraminal stenosis, greatest at C5-6 where it is moderate-severe bilaterally. Moderate multilevel hypertrophic facet arthropathy.  He was seen by pain and started on gabaepntin 300 mg qhs which has helped with his pain.  If worsens then pain would like to do a cervical steroid injection.  He completed PT without much difference in his pain.  He reports gabapentin 300 mg nightly completely controls his pain.       He has osteoarthritis of both hands left > right. 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 He says years ago he had a steroid injection in his thumb that helped a similar problem. Rheumatologic workup was negative on 6/2021.        He was diagnosed with left achilles tendonitis that started in 2024 after wearing boots on a hiking trip.  He continues with pain despite rest and having his foot in a boot. He is scheduled to have a steroid injection by podiatry    He lives with his wife and feels safe at home. He is very active and enjoys working in his yard. He goes to the gym 2-3 times a week for one hour and does bike and weight training. He does eat healthy. No falls or balance issues.  He is going on a cruise in February. He is working on getting hearing aids.      Colonoscopy---2/2018 repeat in 8 years  Tdap---9/2017  Influenza vaccine---11/2024  Pneumovax 23----6/2018  Prevnar 13----6/2017  Shingles vaccine----- 11/2018, 1/2019   Covid vaccine---5 doses  RSV vaccine---9/2024     Review of Systems   Constitutional:  Negative for appetite change, fatigue, fever and unexpected weight change.   HENT:  Negative for congestion, ear pain, hearing loss, sore throat and trouble swallowing.    Eyes:  Negative for pain and visual disturbance.   Respiratory:  Negative for cough, chest tightness, shortness of breath and wheezing.    Cardiovascular:  Negative for chest pain, palpitations and leg swelling.   Gastrointestinal:  Negative for abdominal pain, blood in stool, constipation, diarrhea, nausea and vomiting.   Endocrine: Negative for polyuria.   Genitourinary:  Negative for dysuria and hematuria.   Musculoskeletal:  Positive for arthralgias. Negative for back pain and myalgias.   Skin:  Negative for rash.   Neurological:  Negative for dizziness, weakness, numbness and headaches.   Hematological:  Does not bruise/bleed easily.   Psychiatric/Behavioral:  Positive for sleep disturbance. Negative for dysphoric mood  "and suicidal ideas. The patient is not nervous/anxious.        Objective:      Vitals:    01/16/25 1044   BP: 126/62   BP Location: Right arm   Patient Position: Sitting   Pulse: 96   Resp: 18   Temp: 98.2 °F (36.8 °C)   TempSrc: Temporal   SpO2: 98%   Weight: 80.9 kg (178 lb 5.6 oz)   Height: 5' 11" (1.803 m)     Body mass index is 24.88 kg/m².  Physical Exam    General appearance: No acute distress, cooperative  Eyes: PERRL, EOMI, conjunctiva clear  Ears: normal external ear and pinna, tm clear without drainage, canals clear  Nose: Normal mucosa without drainage  Throat: no exudates or erythema, tonsils not enlarged  Mouth: no sores or lesions, moist mucous membranes  Neck: FROM, soft, supple, no thyromegaly, no bruits  Lymph: no anterior or posterior cervical adenopathy  Heart::  Regular rate and rhythm, no murmur  Lung: Clear to ascultation bilaterally, no wheezing, no rales, no rhonchi, no distress  Abdomen: Soft, nontender, no distention, no hepatosplenomegaly, bowel sounds normal, no guarding, no rebound, no peritoneal signs  Skin: no rashes, no lesions  Extremities: no edema, no cyanosis  Neuro: CN 2-12 intact, 5/5 muscle strength upper and lower extremity bilaterally, 2+ DTRs UE and LE bilaterally, normal gait  Peripheral pulses: 2+ pedal pulses bilaterally, good perfusion and color  Musculoskeletal: FROM, good strenth, no tenderness  Joint: normal appearance, no swelling, no warmth, no deformity in all joints    Assessment:       1. Essential hypertension    2. Hyperlipidemia, unspecified hyperlipidemia type    3. Chronic allergic rhinitis    4. Hypothyroidism, unspecified type    5. Irritable bowel syndrome, unspecified type    6. Benign non-nodular prostatic hyperplasia without lower urinary tract symptoms    7. Primary insomnia    8. Bilateral hearing loss, unspecified hearing loss type    9. Tendonitis, Achilles, left    10. Spondylosis of lumbar region without myelopathy or radiculopathy    11. " Primary osteoarthritis of both hands    12. Osteoarthritis of spine with radiculopathy, cervical region    13. Screen for colon cancer    14. Need for COVID-19 vaccine        Plan:       Essential hypertension  Good control on losartan 50 mg daily and amlodipine 5 mg in the am and 2.5 mg in the pm.   -     CBC Auto Differential; Future; Expected date: 01/16/2025  -     Comprehensive Metabolic Panel; Future; Expected date: 01/16/2025  -     Lipid Panel; Future; Expected date: 01/16/2025  -     TSH; Future; Expected date: 01/16/2025  -     amLODIPine (NORVASC) 2.5 MG tablet; Take 1 pill at night  Dispense: 90 tablet; Refill: 3  -     amLODIPine (NORVASC) 5 MG tablet; Take 1 tablet (5 mg total) by mouth once daily.  Dispense: 90 tablet; Refill: 3    Hyperlipidemia, unspecified hyperlipidemia type  Good control on crestor    Chronic allergic rhinitis  Well controlled and continue current regimen.     Hypothyroidism, unspecified type  TSH is mildly slow and will continue current medication at this time (no symptoms). Will recheck TSH with next labs.     Irritable bowel syndrome, unspecified type  STable on doxepin    Benign non-nodular prostatic hyperplasia without lower urinary tract symptoms  Improved on flomax    Primary insomnia  Good control on temazepam    Bilateral hearing loss, unspecified hearing loss type  He is working on getting hearing aids    Tendonitis, Achilles, left  Uncontrolled and scheduled to see podiatry next week for a steroid injection before his Cruise.    Spondylosis of lumbar region without myelopathy or radiculopathy  Stable on gabapentin and mobic    Primary osteoarthritis of both hands  Stable on mobic    Osteoarthritis of spine with radiculopathy, cervical region  Good control on gabapentin and mobic    Screen for colon cancer  -     Fecal Immunochemical Test (iFOBT); Future; Expected date: 01/16/2025    Need for COVID-19 vaccine  -     COVID-19 (Moderna) 50 mcg/0.5 mL IM vaccine (>/= 12  yo) 0.5 mL    Follow up in about 6 months (around 7/16/2025) for chronic medical issues.

## 2025-01-29 ENCOUNTER — TELEPHONE (OUTPATIENT)
Dept: PODIATRY | Facility: CLINIC | Age: 80
End: 2025-01-29
Payer: MEDICARE
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
## 2025-02-03 ENCOUNTER — TELEPHONE (OUTPATIENT)
Dept: FAMILY MEDICINE | Facility: CLINIC | Age: 80
End: 2025-02-03
Payer: MEDICARE

## 2025-02-03 DIAGNOSIS — M47.22 OSTEOARTHRITIS OF SPINE WITH RADICULOPATHY, CERVICAL REGION: Primary | ICD-10-CM

## 2025-02-03 RX ORDER — HYDROCODONE BITARTRATE AND ACETAMINOPHEN 5; 325 MG/1; MG/1
1 TABLET ORAL EVERY 8 HOURS PRN
Qty: 30 TABLET | Refills: 0 | Status: SHIPPED | OUTPATIENT
Start: 2025-02-03

## 2025-02-03 NOTE — TELEPHONE ENCOUNTER
I will send  him in hydrocodone to use bid for more severe pain.  He should not use temazepam while on pain medication.     Thanks

## 2025-02-03 NOTE — TELEPHONE ENCOUNTER
----- Message from Kathy sent at 2/3/2025  9:30 AM CST -----  Contact: self  Type:  Needs Medical Advice    Who Called: Pt  Symptoms (please be specific): Heel spurs / hip pain   How long has patient had these symptoms:  1 year / have been going to Pod w/cortizone shots  Pharmacy name and phone #:    [id number] [address]  [address] [place] [address]  Phone: [phone] Fax: [phone]  Would the patient rather a call back or a response via [facility]? call  Best Call Back Number:  [phone]  Additional Information:  Pt is requesting something for pain, pt is going on a cruise Wednesday.  Please call pt to advise... Thank you...

## 2025-02-04 ENCOUNTER — TELEPHONE (OUTPATIENT)
Dept: FAMILY MEDICINE | Facility: CLINIC | Age: 80
End: 2025-02-04
Payer: MEDICARE

## 2025-02-04 NOTE — TELEPHONE ENCOUNTER
----- Message from Cheryl sent at 2/4/2025 11:42 AM CST -----  Regarding: call  Type:  Patient Returning Call    Who Called:Pt    Who Left Message for Patient:Nurse    Does the patient know what this is regarding?:Meds    Would the patient rather a call back or a response via [facility]? Call    Best Call Back Number:[phone]    Additional Information: Pt sts  he did get the message  and will follow directions. Thanks

## 2025-02-04 NOTE — TELEPHONE ENCOUNTER
LVM with message not to use temazepam while on pain med that was called in. Asked pt to call back to clinic.

## 2025-02-04 NOTE — TELEPHONE ENCOUNTER
----- Message from Irish sent at 2/4/2025  9:00 AM CST -----  Contact: self  Type:  Patient Returning Call    Who Called:  pt   Who Left Message for Patient:  no idea nothing logged today  Does the patient know what this is regarding?:  about medicine that was called in  Best Call Back Number:  [phone]  Additional Information:  thanks

## 2025-02-21 DIAGNOSIS — Z00.00 ENCOUNTER FOR MEDICARE ANNUAL WELLNESS EXAM: ICD-10-CM

## 2025-03-05 ENCOUNTER — OFFICE VISIT (OUTPATIENT)
Dept: PODIATRY | Facility: CLINIC | Age: 80
End: 2025-03-05
Payer: MEDICARE

## 2025-03-05 VITALS — HEIGHT: 71 IN | WEIGHT: 178.38 LBS | BODY MASS INDEX: 24.97 KG/M2

## 2025-03-05 DIAGNOSIS — M76.62 ACHILLES TENDINITIS OF LEFT LOWER EXTREMITY: ICD-10-CM

## 2025-03-05 DIAGNOSIS — M77.52 POSTCALCANEAL BURSITIS OF LEFT FOOT: Primary | ICD-10-CM

## 2025-03-05 DIAGNOSIS — M62.462 GASTROCNEMIUS EQUINUS, LEFT: ICD-10-CM

## 2025-03-05 DIAGNOSIS — M89.8X7 RETROCALCANEAL EXOSTOSIS: ICD-10-CM

## 2025-03-05 PROCEDURE — 99999 PR PBB SHADOW E&M-EST. PATIENT-LVL II: CPT | Mod: PBBFAC,,, | Performed by: PODIATRIST

## 2025-03-05 PROCEDURE — 99214 OFFICE O/P EST MOD 30 MIN: CPT | Mod: S$GLB,,, | Performed by: PODIATRIST

## 2025-03-05 PROCEDURE — 1126F AMNT PAIN NOTED NONE PRSNT: CPT | Mod: CPTII,S$GLB,, | Performed by: PODIATRIST

## 2025-03-05 PROCEDURE — 1159F MED LIST DOCD IN RCRD: CPT | Mod: CPTII,S$GLB,, | Performed by: PODIATRIST

## 2025-03-05 PROCEDURE — 3288F FALL RISK ASSESSMENT DOCD: CPT | Mod: CPTII,S$GLB,, | Performed by: PODIATRIST

## 2025-03-05 PROCEDURE — 1101F PT FALLS ASSESS-DOCD LE1/YR: CPT | Mod: CPTII,S$GLB,, | Performed by: PODIATRIST

## 2025-03-09 NOTE — PROGRESS NOTES
Subjective:     Patient ID: Eric Buitrago is a 79 y.o. male.    Chief Complaint: Heel Pain (left)  Patient presents to clinic for a follow up regarding Lt. Posterior heel pain.  Notes continued pain with all weight bearing.  Symptoms improve slightly throughout the day, however, is really beginning to impede activities of daily living.  He continues wearing supportive shoes and is stretching as instructed.   He has failed past attempts at PT.  Inquires as to the next step in treatment.  Denies any additional pedal complaints.     Past Medical History:   Diagnosis Date    Allergy     Hyperlipidemia     Hypertension     Hypothyroid     IBS (irritable bowel syndrome)        Past Surgical History:   Procedure Laterality Date    CHOLECYSTECTOMY      COLONOSCOPY  ~    RABITO.    COLONOSCOPY N/A 2018    Procedure: COLONOSCOPY;  Surgeon: Josue Myles Jr., MD;  Location: [facility];  Service: Endoscopy;  Laterality: N/A;       Family History   Problem Relation Name Age of Onset    Lymphoma Mother      Stroke Father      Breast cancer Sister      Leukemia Brother      Glaucoma Neg Hx      Macular degeneration Neg Hx      Retinal detachment Neg Hx         Social History     Socioeconomic History    Marital status:    Occupational History    Occupation: retired school psycholgist   Tobacco Use    Smoking status: Former     Current packs/day: 0.00     Average packs/day: 0.3 packs/day for 10.0 years (2.5 ttl pk-yrs)     Types: Cigarettes     Start date: 1973     Quit date: 1983     Years since quittin.6    Smokeless tobacco: Never   Substance and Sexual Activity    Alcohol use: Yes     Comment: occ    Drug use: No    Sexual activity: Not Currently     Social Drivers of Health     Financial Resource Strain: Low Risk  (2024)    Overall Financial Resource Strain (CARDIA)     Difficulty of Paying Living Expenses: Not hard at all   Food Insecurity: No Food Insecurity (2024)    Hunger  Vital Sign     Worried About Running Out of Food in the Last Year: Never true     Ran Out of Food in the Last Year: Never true   Transportation Needs: No Transportation Needs (11/4/2021)    PRAPARE - Transportation     Lack of Transportation (Medical): No     Lack of Transportation (Non-Medical): No   Physical Activity: Sufficiently Active (7/14/2024)    Exercise Vital Sign     Days of Exercise per Week: 3 days     Minutes of Exercise per Session: 60 min   Stress: No Stress Concern Present (7/14/2024)    [employer] [place] [employer] - Occupational Stress Questionnaire     Feeling of Stress : Only a little   Housing Stability: Unknown (10/8/2020)    Housing Stability Vital Sign     Unable to Pay for Housing in the Last Year: No     Unstable Housing in the Last Year: No       Current Outpatient Medications   Medication Sig Dispense Refill    amLODIPine (NORVASC) 2.5 MG tablet Take 1 pill at night 90 tablet 3    amLODIPine (NORVASC) 5 MG tablet Take 1 tablet (5 mg total) by mouth once daily. 90 tablet 3    azelastine (ASTELIN) 137 mcg (0.1 %) nasal spray SPRAY 1 SPRAY IN EACH NOSTRIL TWICE DAILY 90 mL 3    doxepin (SINEQUAN) 50 MG capsule TAKE 2 CAPSULES BY MOUTH 1 TIME IN THE EVENING 180 capsule 2    fexofenadine (ALLEGRA) 60 MG tablet Take 1 tablet by mouth.       gabapentin (NEURONTIN) 300 MG capsule TAKE 1 CAPSULE(300 MG) BY MOUTH EVERY EVENING 90 capsule 2    HYDROcodone-acetaminophen (NORCO) 5-325 mg per tablet Take 1 tablet by mouth every 8 (eight) hours as needed for Pain. 30 tablet 0    levothyroxine (SYNTHROID) 137 MCG Tab tablet TAKE 1 TABLET(137 MCG) BY MOUTH BEFORE BREAKFAST 90 tablet 3    losartan (COZAAR) 50 MG tablet TAKE 1 TABLET(50 MG) BY MOUTH EVERY DAY 90 tablet 1    meloxicam (MOBIC) 15 MG tablet TAKE 1 TABLET(15 MG) BY MOUTH EVERY DAY 90 tablet 3    rosuvastatin (CRESTOR) 5 MG tablet TAKE 1 TABLET(5 MG) BY MOUTH EVERY DAY 90 tablet 3    tamsulosin (FLOMAX) 0.4 mg Cap TAKE 2  CAPSULES(0.8 MG) BY MOUTH EVERY EVENING 180 capsule 1    temazepam (RESTORIL) 15 mg Cap Take 1 capsule (15 mg total) by mouth every night at bedtime. 90 capsule 1     No current facility-administered medications for this visit.       Review of patient's allergies indicates:   Allergen Reactions    No known drug allergies         Hemoglobin A1C   Date Value Ref Range Status   01/14/2025 4.7 4.0 - 5.6 % Final     Comment:     ADA Screening Guidelines:  5.7-6.4%  Consistent with prediabetes  >or=6.5%  Consistent with diabetes    High levels of fetal hemoglobin interfere with the HbA1C  assay. Heterozygous hemoglobin variants (HbS, HgC, etc)do  not significantly interfere with this assay.   However, presence of multiple variants may affect accuracy.     08/15/2024 4.8 4.0 - 5.6 % Final     Comment:     ADA Screening Guidelines:  5.7-6.4%  Consistent with prediabetes  >or=6.5%  Consistent with diabetes    High levels of fetal hemoglobin interfere with the HbA1C  assay. Heterozygous hemoglobin variants (HbS, HgC, etc)do  not significantly interfere with this assay.   However, presence of multiple variants may affect accuracy.     12/14/2017 4.5 4.0 - 5.6 % Final     Comment:     According to ADA guidelines, hemoglobin A1c <7.0% represents  optimal control in non-pregnant diabetic patients. Different  metrics may apply to specific patient populations.   Standards of Medical Care in Diabetes-2016.  For the purpose of screening for the presence of diabetes:  <5.7%     Consistent with the absence of diabetes  5.7-6.4%  Consistent with increasing risk for diabetes   (prediabetes)  >or=6.5%  Consistent with diabetes  Currently, no consensus exists for use of hemoglobin A1c  for diagnosis of diabetes for children.  This Hemoglobin A1c assay has significant interference with fetal   hemoglobin   (HbF). The results are invalid for patients with abnormal amounts of   HbF,   including those with known Hereditary Persistence   of  Fetal Hemoglobin. Heterozygous hemoglobin variants (HbAS, HbAC,   HbAD, HbAE, HbA2) do not significantly interfere with this assay;   however, presence of multiple variants in a sample may impact the %   interference.         Review of Systems   Constitutional: Negative for chills and fever.   Cardiovascular:  Negative for claudication and leg swelling.   Skin:  Positive for color change and nail changes.   Musculoskeletal:  Positive for myalgias. Negative for joint pain, joint swelling, muscle cramps and muscle weakness.   Gastrointestinal:  Negative for nausea and vomiting.   Neurological:  Negative for numbness and paresthesias.   Psychiatric/Behavioral:  Negative for altered mental status.         Objective:     Physical Exam  Constitutional:       Appearance: Normal appearance. He is not ill-appearing.   Cardiovascular:      Pulses:           Dorsalis pedis pulses are 2+ on the right side and 2+ on the left side.        Posterior tibial pulses are 2+ on the right side and 2+ on the left side.      Comments: CFT is < 3 seconds bilateral.  Pedal hair growth is present bilateral.  No lower extremity edema noted bilateral.  Toes are warm to touch bilateral.    Musculoskeletal:         General: Tenderness present. No deformity or signs of injury.      Right lower leg: No edema.      Left lower leg: No edema.      Comments: Muscle strength 5/5 in all muscle groups bilateral.  No tenderness nor crepitation with ROM of foot/ankle joints bilateral.  (+) for pain with palpation to the bursa overlying the Lt. Retrocalcaneal exostosis.  Lt. Sided gastrocnemius equinus with significant improvement in dorsiflexion.  (+) for pain with palpation to the Lt. Achilles tendon at its insertion.  (-) dell or defect noted to said tendon.     Skin:     General: Skin is warm and dry.      Capillary Refill: Capillary refill takes 2 to 3 seconds.      Findings: No bruising, ecchymosis, erythema, signs of injury, laceration, lesion,  "petechiae, rash or wound.      Comments: Pedal skin has normal turgor, temperature, and texture bilateral.  Significant mycotic changes noted to the Lt. Hallux toenail.  Remaining toenails x 9 appear normotrophic.    Neurological:      General: No focal deficit present.      Mental Status: He is alert.      Sensory: No sensory deficit.      Motor: No weakness or atrophy.      Comments: Light touch is intact bilateral.             Assessment:      Encounter Diagnoses   Name Primary?    Postcalcaneal bursitis of left foot Yes    Achilles tendinitis of left lower extremity     Retrocalcaneal exostosis     Gastrocnemius equinus, left        Plan:     Eric Razo" was seen today for heel pain.    Diagnoses and all orders for this visit:    Postcalcaneal bursitis of left foot    Achilles tendinitis of left lower extremity    Retrocalcaneal exostosis    Gastrocnemius equinus, left        I counseled the patient on his conditions, their implications and medical management.    - Being that past injections have provided relief for only several weeks at a time, I advise against further steroid use.  Recommend a surgical consultation with Dr. Noriega going forward.  Patient is amenable to said plan.       - To continue performing stretching exercises to address bilateral equinus.     - Recommend wearing supportive shoes only.  Discussed avoidance of barefoot walking, flip flops, and Crocs, as this will exacerbate current symptoms.      - Discussed avoidance of high impact activities such as squatting, stooping, and running as these activities will exacerbate symptoms.       - May consider applying a topical analgesic (Voltaren cream) to help with pain symptoms.      - RTC prn.     Mikhail Mcmullen DPM     "

## 2025-03-26 ENCOUNTER — OFFICE VISIT (OUTPATIENT)
Dept: OPTOMETRY | Facility: CLINIC | Age: 80
End: 2025-03-26
Payer: COMMERCIAL

## 2025-03-26 DIAGNOSIS — H52.7 REFRACTIVE ERROR: Primary | ICD-10-CM

## 2025-03-26 DIAGNOSIS — H25.13 NUCLEAR SCLEROSIS OF BOTH EYES: ICD-10-CM

## 2025-03-26 DIAGNOSIS — H40.013 OPEN ANGLE WITH BORDERLINE FINDINGS AND LOW GLAUCOMA RISK IN BOTH EYES: ICD-10-CM

## 2025-03-26 PROCEDURE — 99999 PR PBB SHADOW E&M-EST. PATIENT-LVL III: CPT | Mod: PBBFAC,,, | Performed by: OPTOMETRIST

## 2025-03-26 PROCEDURE — 92015 DETERMINE REFRACTIVE STATE: CPT | Mod: S$GLB,,, | Performed by: OPTOMETRIST

## 2025-03-26 PROCEDURE — 92014 COMPRE OPH EXAM EST PT 1/>: CPT | Mod: S$GLB,,, | Performed by: OPTOMETRIST

## 2025-03-26 NOTE — PROGRESS NOTES
HPI     Annual Exam            Comments: Ldle 09/20/2023          Comments    Pt states : vision is stable no complaints puts bifocals on when needed   Uses clear eyes prn  Denies F/F   HTN is being controled w meds           Last edited by Real Calvert on 3/26/2025  2:33 PM.            Assessment /Plan     For exam results, see Encounter Report.    Refractive error    Nuclear sclerosis of both eyes    Open angle with borderline findings and low glaucoma risk in both eyes      New Spectacle Rx given, discussed different options for glasses. RTC 1 year routine eye exam.  2. Educated pt on presence of cataracts and effects on vision. No surgery at this time. Recheck in one year.  3. Low risk based on CD, iop normal, no fam history OCT of rnfl normal again today, RTC yearly.

## 2025-04-15 ENCOUNTER — OFFICE VISIT (OUTPATIENT)
Dept: PODIATRY | Facility: CLINIC | Age: 80
End: 2025-04-15
Payer: MEDICARE

## 2025-04-15 VITALS — WEIGHT: 178.38 LBS | BODY MASS INDEX: 24.97 KG/M2 | HEIGHT: 71 IN

## 2025-04-15 DIAGNOSIS — M77.52 POSTCALCANEAL BURSITIS OF LEFT FOOT: Primary | ICD-10-CM

## 2025-04-15 DIAGNOSIS — M92.62 HAGLUND'S DEFORMITY OF LEFT HEEL: ICD-10-CM

## 2025-04-15 PROCEDURE — 99213 OFFICE O/P EST LOW 20 MIN: CPT | Mod: S$GLB,,, | Performed by: PODIATRIST

## 2025-04-15 PROCEDURE — 1159F MED LIST DOCD IN RCRD: CPT | Mod: CPTII,S$GLB,, | Performed by: PODIATRIST

## 2025-04-15 PROCEDURE — 1160F RVW MEDS BY RX/DR IN RCRD: CPT | Mod: CPTII,S$GLB,, | Performed by: PODIATRIST

## 2025-04-15 PROCEDURE — 3288F FALL RISK ASSESSMENT DOCD: CPT | Mod: CPTII,S$GLB,, | Performed by: PODIATRIST

## 2025-04-15 PROCEDURE — 1125F AMNT PAIN NOTED PAIN PRSNT: CPT | Mod: CPTII,S$GLB,, | Performed by: PODIATRIST

## 2025-04-15 PROCEDURE — 99999 PR PBB SHADOW E&M-EST. PATIENT-LVL III: CPT | Mod: PBBFAC,,, | Performed by: PODIATRIST

## 2025-04-15 PROCEDURE — 1101F PT FALLS ASSESS-DOCD LE1/YR: CPT | Mod: CPTII,S$GLB,, | Performed by: PODIATRIST

## 2025-04-15 NOTE — PROGRESS NOTES
Subjective:     Patient ID: Eric Buitrago is a 79 y.o. male.    Chief Complaint: Ankle Pain  Patient presents to clinic for a follow up regarding Lt. Posterior heel pain.  Notes continued pain with all weight bearing.  Symptoms improve slightly throughout the day, however, is really beginning to impede activities of daily living.  He continues wearing supportive shoes and is stretching as instructed.   He has failed past attempts at PT.  Inquires as to the next step in treatment.  Denies any additional pedal complaints.     4/15/25: patient returns for follow up left posterior heel pain that has been going on for about 2 years, he has seen Dr. Mcmullen who was able to do some injections and he was in a boot for a time which helped but wore off. He is here as a consult for possible surgical options.     Past Medical History:   Diagnosis Date    Allergy     Hyperlipidemia     Hypertension     Hypothyroid     IBS (irritable bowel syndrome)        Past Surgical History:   Procedure Laterality Date    CHOLECYSTECTOMY      COLONOSCOPY  ~    RABITO.    COLONOSCOPY N/A 2018    Procedure: COLONOSCOPY;  Surgeon: Josue Myles Jr., MD;  Location: [facility];  Service: Endoscopy;  Laterality: N/A;       Family History   Problem Relation Name Age of Onset    Lymphoma Mother      Stroke Father      Breast cancer Sister      Leukemia Brother      Glaucoma Neg Hx      Macular degeneration Neg Hx      Retinal detachment Neg Hx         Social History     Socioeconomic History    Marital status:    Occupational History    Occupation: retired school psycholgist   Tobacco Use    Smoking status: Former     Current packs/day: 0.00     Average packs/day: 0.3 packs/day for 10.0 years (2.5 ttl pk-yrs)     Types: Cigarettes     Start date: 1973     Quit date: 1983     Years since quittin.7    Smokeless tobacco: Never   Substance and Sexual Activity    Alcohol use: Yes     Comment: occ    Drug use: No     Sexual activity: Not Currently     Social Drivers of Health     Financial Resource Strain: Low Risk  (7/14/2024)    Overall Financial Resource Strain (CARDIA)     Difficulty of Paying Living Expenses: Not hard at all   Food Insecurity: No Food Insecurity (7/14/2024)    Hunger Vital Sign     Worried About Running Out of Food in the Last Year: Never true     Ran Out of Food in the Last Year: Never true   Transportation Needs: No Transportation Needs (11/4/2021)    PRAPARE - Transportation     Lack of Transportation (Medical): No     Lack of Transportation (Non-Medical): No   Physical Activity: Sufficiently Active (7/14/2024)    Exercise Vital Sign     Days of Exercise per Week: 3 days     Minutes of Exercise per Session: 60 min   Stress: No Stress Concern Present (7/14/2024)    [employer] [place] [employer] - Occupational Stress Questionnaire     Feeling of Stress : Only a little   Housing Stability: Unknown (10/8/2020)    Housing Stability Vital Sign     Unable to Pay for Housing in the Last Year: No     Unstable Housing in the Last Year: No       Current Outpatient Medications   Medication Sig Dispense Refill    amLODIPine (NORVASC) 2.5 MG tablet Take 1 pill at night 90 tablet 3    amLODIPine (NORVASC) 5 MG tablet Take 1 tablet (5 mg total) by mouth once daily. 90 tablet 3    azelastine (ASTELIN) 137 mcg (0.1 %) nasal spray SPRAY 1 SPRAY IN EACH NOSTRIL TWICE DAILY 90 mL 3    doxepin (SINEQUAN) 50 MG capsule TAKE 2 CAPSULES BY MOUTH 1 TIME IN THE EVENING 180 capsule 2    fexofenadine (ALLEGRA) 60 MG tablet Take 1 tablet by mouth.       gabapentin (NEURONTIN) 300 MG capsule TAKE 1 CAPSULE(300 MG) BY MOUTH EVERY EVENING 90 capsule 2    HYDROcodone-acetaminophen (NORCO) 5-325 mg per tablet Take 1 tablet by mouth every 8 (eight) hours as needed for Pain. 30 tablet 0    levothyroxine (SYNTHROID) 137 MCG Tab tablet TAKE 1 TABLET(137 MCG) BY MOUTH BEFORE BREAKFAST 90 tablet 3    losartan (COZAAR) 50 MG tablet  TAKE 1 TABLET(50 MG) BY MOUTH EVERY DAY 90 tablet 1    meloxicam (MOBIC) 15 MG tablet TAKE 1 TABLET(15 MG) BY MOUTH EVERY DAY 90 tablet 3    rosuvastatin (CRESTOR) 5 MG tablet TAKE 1 TABLET(5 MG) BY MOUTH EVERY DAY 90 tablet 3    tamsulosin (FLOMAX) 0.4 mg Cap TAKE 2 CAPSULES(0.8 MG) BY MOUTH EVERY EVENING 180 capsule 1    temazepam (RESTORIL) 15 mg Cap Take 1 capsule (15 mg total) by mouth every night at bedtime. 90 capsule 1     No current facility-administered medications for this visit.       Review of patient's allergies indicates:   Allergen Reactions    No known drug allergies         Hemoglobin A1C   Date Value Ref Range Status   01/14/2025 4.7 4.0 - 5.6 % Final     Comment:     ADA Screening Guidelines:  5.7-6.4%  Consistent with prediabetes  >or=6.5%  Consistent with diabetes    High levels of fetal hemoglobin interfere with the HbA1C  assay. Heterozygous hemoglobin variants (HbS, HgC, etc)do  not significantly interfere with this assay.   However, presence of multiple variants may affect accuracy.     08/15/2024 4.8 4.0 - 5.6 % Final     Comment:     ADA Screening Guidelines:  5.7-6.4%  Consistent with prediabetes  >or=6.5%  Consistent with diabetes    High levels of fetal hemoglobin interfere with the HbA1C  assay. Heterozygous hemoglobin variants (HbS, HgC, etc)do  not significantly interfere with this assay.   However, presence of multiple variants may affect accuracy.     12/14/2017 4.5 4.0 - 5.6 % Final     Comment:     According to ADA guidelines, hemoglobin A1c <7.0% represents  optimal control in non-pregnant diabetic patients. Different  metrics may apply to specific patient populations.   Standards of Medical Care in Diabetes-2016.  For the purpose of screening for the presence of diabetes:  <5.7%     Consistent with the absence of diabetes  5.7-6.4%  Consistent with increasing risk for diabetes   (prediabetes)  >or=6.5%  Consistent with diabetes  Currently, no consensus exists for use of  hemoglobin A1c  for diagnosis of diabetes for children.  This Hemoglobin A1c assay has significant interference with fetal   hemoglobin   (HbF). The results are invalid for patients with abnormal amounts of   HbF,   including those with known Hereditary Persistence   of Fetal Hemoglobin. Heterozygous hemoglobin variants (HbAS, HbAC,   HbAD, HbAE, HbA2) do not significantly interfere with this assay;   however, presence of multiple variants in a sample may impact the %   interference.         Review of Systems   Constitutional: Negative for chills and fever.   Cardiovascular:  Negative for claudication and leg swelling.   Skin:  Positive for color change and nail changes.   Musculoskeletal:  Positive for myalgias. Negative for joint pain, joint swelling, muscle cramps and muscle weakness.   Gastrointestinal:  Negative for nausea and vomiting.   Neurological:  Negative for numbness and paresthesias.   Psychiatric/Behavioral:  Negative for altered mental status.         Objective:     Physical Exam  Constitutional:       Appearance: Normal appearance. He is not ill-appearing.   Cardiovascular:      Pulses:           Dorsalis pedis pulses are 2+ on the right side and 2+ on the left side.        Posterior tibial pulses are 2+ on the right side and 2+ on the left side.      Comments: CFT is < 3 seconds bilateral.  Pedal hair growth is present bilateral.  No lower extremity edema noted bilateral.  Toes are warm to touch bilateral.    Musculoskeletal:         General: Tenderness present. No deformity or signs of injury.      Right lower leg: No edema.      Left lower leg: No edema.      Comments: Muscle strength 5/5 in all muscle groups bilateral.  No tenderness nor crepitation with ROM of foot/ankle joints bilateral.  (+) for pain with palpation to the bursa overlying the Lt. Retrocalcaneal exostosis.  Lt. Sided gastrocnemius equinus with significant improvement in dorsiflexion.  (+) for pain with palpation to the Lt.  "Achilles tendon at its insertion.  (-) dell or defect noted to said tendon.     Skin:     General: Skin is warm and dry.      Capillary Refill: Capillary refill takes 2 to 3 seconds.      Findings: No bruising, ecchymosis, erythema, signs of injury, laceration, lesion, petechiae, rash or wound.      Comments: Pedal skin has normal turgor, temperature, and texture bilateral.  Significant mycotic changes noted to the Lt. Hallux toenail.  Remaining toenails x 9 appear normotrophic.    Neurological:      General: No focal deficit present.      Mental Status: He is alert.      Sensory: No sensory deficit.      Motor: No weakness or atrophy.      Comments: Light touch is intact bilateral.             Assessment:      Encounter Diagnoses   Name Primary?    Postcalcaneal bursitis of left foot Yes    Juana's deformity of left heel          Plan:     Eric Razo" was seen today for ankle pain.    Diagnoses and all orders for this visit:    Postcalcaneal bursitis of left foot  -     X-Ray Calcaneus 2 View Left; Future    Juana's deformity of left heel  -     X-Ray Calcaneus 2 View Left; Future          I counseled the patient on his conditions, their implications and medical management.    - Being that past injections have provided relief for only several weeks at a time, I advise against further steroid use.       - To continue performing stretching exercises to address bilateral equinus.     - Recommend wearing supportive shoes only.  Discussed avoidance of barefoot walking, flip flops, and Crocs, as this will exacerbate current symptoms.      - Discussed avoidance of high impact activities such as squatting, stooping, and running as these activities will exacerbate symptoms.       - Discussed surgical options in depth, I believe we could remove the haglunds bump and the cyst from a lateral incisional approach without having to remove the achilles attachment, we could also debride the bursa to this area. May consider also " doing a gastroc recession. Discussed the need to remain in the tall boot post op for 6 weeks. He feels he may want to proceed with this plan, he will think on this and return in 3 weeks to set up this procedure     Jared Noriega DPM

## 2025-05-06 ENCOUNTER — TELEPHONE (OUTPATIENT)
Dept: FAMILY MEDICINE | Facility: CLINIC | Age: 80
End: 2025-05-06
Payer: MEDICARE

## 2025-05-06 ENCOUNTER — HOSPITAL ENCOUNTER (OUTPATIENT)
Dept: RADIOLOGY | Facility: HOSPITAL | Age: 80
Discharge: HOME | End: 2025-05-06
Attending: PODIATRIST
Payer: MEDICARE

## 2025-05-06 ENCOUNTER — OFFICE VISIT (OUTPATIENT)
Dept: PODIATRY | Facility: CLINIC | Age: 80
End: 2025-05-06
Payer: MEDICARE

## 2025-05-06 VITALS — HEIGHT: 71 IN | BODY MASS INDEX: 24.97 KG/M2 | WEIGHT: 178.38 LBS

## 2025-05-06 DIAGNOSIS — M62.462 GASTROCNEMIUS EQUINUS, LEFT: ICD-10-CM

## 2025-05-06 DIAGNOSIS — M92.62 HAGLUND'S DEFORMITY OF LEFT HEEL: ICD-10-CM

## 2025-05-06 DIAGNOSIS — M77.52 POSTCALCANEAL BURSITIS OF LEFT FOOT: ICD-10-CM

## 2025-05-06 DIAGNOSIS — M77.52 POSTCALCANEAL BURSITIS OF LEFT FOOT: Primary | ICD-10-CM

## 2025-05-06 PROCEDURE — 1125F AMNT PAIN NOTED PAIN PRSNT: CPT | Mod: CPTII,S$GLB,, | Performed by: PODIATRIST

## 2025-05-06 PROCEDURE — 1160F RVW MEDS BY RX/DR IN RCRD: CPT | Mod: CPTII,S$GLB,, | Performed by: PODIATRIST

## 2025-05-06 PROCEDURE — 73650 X-RAY EXAM OF HEEL: CPT | Mod: 26,LT,, | Performed by: RADIOLOGY

## 2025-05-06 PROCEDURE — 73650 X-RAY EXAM OF HEEL: CPT | Mod: TC,PO,LT

## 2025-05-06 PROCEDURE — 99213 OFFICE O/P EST LOW 20 MIN: CPT | Mod: S$GLB,,, | Performed by: PODIATRIST

## 2025-05-06 PROCEDURE — 1159F MED LIST DOCD IN RCRD: CPT | Mod: CPTII,S$GLB,, | Performed by: PODIATRIST

## 2025-05-06 PROCEDURE — 3288F FALL RISK ASSESSMENT DOCD: CPT | Mod: CPTII,S$GLB,, | Performed by: PODIATRIST

## 2025-05-06 PROCEDURE — 99999 PR PBB SHADOW E&M-EST. PATIENT-LVL IV: CPT | Mod: PBBFAC,,, | Performed by: PODIATRIST

## 2025-05-06 PROCEDURE — 1101F PT FALLS ASSESS-DOCD LE1/YR: CPT | Mod: CPTII,S$GLB,, | Performed by: PODIATRIST

## 2025-05-06 NOTE — TELEPHONE ENCOUNTER
----- Message from [place] sent at 5/6/2025  2:09 PM CDT -----  Regarding: JUAN LUIS SAGASTUME [[id number]]  Type : Patient Call  Who Called :JUAN LUIS SAGASTUME [[id number]]  Does the patient know what this is regarding?:#patient called and stated that he would like to receive a call back in regard to getting and appointment for a pre op clearance for EXCISION, EXOSTOSIS, RETROCALCANEAL scheduled on 05/29/25. please follow up as soon as possible to arrange. Thanks!!   Would the patient rather a call back or a response via [facility]?call back    Best Call Back Number:[phone]  Additional Information:

## 2025-05-06 NOTE — PROGRESS NOTES
Subjective:     Patient ID: Eric Buitrago is a 79 y.o. male.    Chief Complaint: Foot Pain  Patient presents to clinic for a follow up regarding Lt. Posterior heel pain.  Notes continued pain with all weight bearing.  Symptoms improve slightly throughout the day, however, is really beginning to impede activities of daily living.  He continues wearing supportive shoes and is stretching as instructed.   He has failed past attempts at PT.  Inquires as to the next step in treatment.  Denies any additional pedal complaints.     4/15/25: patient returns for follow up left posterior heel pain that has been going on for about 2 years, he has seen Dr. Mcmullen who was able to do some injections and he was in a boot for a time which helped but wore off. He is here as a consult for possible surgical options.     5/6/25: patient return for follow up left posterior heel pain over the last couple years despite offloading in a boot, PT and antiinflammatories the area continues to hurt. Here to set up surgical intervention    Past Medical History:   Diagnosis Date    Allergy     Hyperlipidemia     Hypertension     Hypothyroid     IBS (irritable bowel syndrome)        Past Surgical History:   Procedure Laterality Date    CHOLECYSTECTOMY  2007    COLONOSCOPY  ~2007    RABITO.    COLONOSCOPY N/A 2/21/2018    Procedure: COLONOSCOPY;  Surgeon: Josue Myles Jr., MD;  Location: [facility];  Service: Endoscopy;  Laterality: N/A;       Family History   Problem Relation Name Age of Onset    Lymphoma Mother      Stroke Father      Breast cancer Sister      Leukemia Brother      Glaucoma Neg Hx      Macular degeneration Neg Hx      Retinal detachment Neg Hx         Social History     Socioeconomic History    Marital status:    Occupational History    Occupation: retired school psycholgist   Tobacco Use    Smoking status: Former     Current packs/day: 0.00     Average packs/day: 0.3 packs/day for 10.0 years (2.5 ttl pk-yrs)      Types: Cigarettes     Start date: 1973     Quit date: 1983     Years since quittin.8    Smokeless tobacco: Never   Substance and Sexual Activity    Alcohol use: Yes     Comment: occ    Drug use: No    Sexual activity: Not Currently     Social Drivers of Health     Financial Resource Strain: Low Risk  (2024)    Overall Financial Resource Strain (CARDIA)     Difficulty of Paying Living Expenses: Not hard at all   Food Insecurity: No Food Insecurity (2024)    Hunger Vital Sign     Worried About Running Out of Food in the Last Year: Never true     Ran Out of Food in the Last Year: Never true   Transportation Needs: No Transportation Needs (2021)    PRAPARE - Transportation     Lack of Transportation (Medical): No     Lack of Transportation (Non-Medical): No   Physical Activity: Sufficiently Active (2024)    Exercise Vital Sign     Days of Exercise per Week: 3 days     Minutes of Exercise per Session: 60 min   Stress: No Stress Concern Present (2024)    [employer] [place] - Occupational Stress Questionnaire     Feeling of Stress : Only a little   Housing Stability: Unknown (10/8/2020)    Housing Stability Vital Sign     Unable to Pay for Housing in the Last Year: No     Unstable Housing in the Last Year: No       Current Outpatient Medications   Medication Sig Dispense Refill    amLODIPine (NORVASC) 2.5 MG tablet Take 1 pill at night 90 tablet 3    amLODIPine (NORVASC) 5 MG tablet Take 1 tablet (5 mg total) by mouth once daily. 90 tablet 3    azelastine (ASTELIN) 137 mcg (0.1 %) nasal spray SPRAY 1 SPRAY IN EACH NOSTRIL TWICE DAILY 90 mL 3    doxepin (SINEQUAN) 50 MG capsule TAKE 2 CAPSULES BY MOUTH 1 TIME IN THE EVENING 180 capsule 2    fexofenadine (ALLEGRA) 60 MG tablet Take 1 tablet by mouth.       gabapentin (NEURONTIN) 300 MG capsule TAKE 1 CAPSULE(300 MG) BY MOUTH EVERY EVENING 90 capsule 2    HYDROcodone-acetaminophen (NORCO) 5-325 mg per tablet Take 1  tablet by mouth every 8 (eight) hours as needed for Pain. 30 tablet 0    levothyroxine (SYNTHROID) 137 MCG Tab tablet TAKE 1 TABLET(137 MCG) BY MOUTH BEFORE BREAKFAST 90 tablet 3    losartan (COZAAR) 50 MG tablet TAKE 1 TABLET(50 MG) BY MOUTH EVERY DAY 90 tablet 1    meloxicam (MOBIC) 15 MG tablet TAKE 1 TABLET(15 MG) BY MOUTH EVERY DAY 90 tablet 3    rosuvastatin (CRESTOR) 5 MG tablet TAKE 1 TABLET(5 MG) BY MOUTH EVERY DAY 90 tablet 3    tamsulosin (FLOMAX) 0.4 mg Cap TAKE 2 CAPSULES(0.8 MG) BY MOUTH EVERY EVENING 180 capsule 1    temazepam (RESTORIL) 15 mg Cap Take 1 capsule (15 mg total) by mouth every night at bedtime. 90 capsule 1     No current facility-administered medications for this visit.       Review of patient's allergies indicates:   Allergen Reactions    No known drug allergies         Hemoglobin A1C   Date Value Ref Range Status   01/14/2025 4.7 4.0 - 5.6 % Final     Comment:     ADA Screening Guidelines:  5.7-6.4%  Consistent with prediabetes  >or=6.5%  Consistent with diabetes    High levels of fetal hemoglobin interfere with the HbA1C  assay. Heterozygous hemoglobin variants (HbS, HgC, etc)do  not significantly interfere with this assay.   However, presence of multiple variants may affect accuracy.     08/15/2024 4.8 4.0 - 5.6 % Final     Comment:     ADA Screening Guidelines:  5.7-6.4%  Consistent with prediabetes  >or=6.5%  Consistent with diabetes    High levels of fetal hemoglobin interfere with the HbA1C  assay. Heterozygous hemoglobin variants (HbS, HgC, etc)do  not significantly interfere with this assay.   However, presence of multiple variants may affect accuracy.     12/14/2017 4.5 4.0 - 5.6 % Final     Comment:     According to ADA guidelines, hemoglobin A1c <7.0% represents  optimal control in non-pregnant diabetic patients. Different  metrics may apply to specific patient populations.   Standards of Medical Care in Diabetes-2016.  For the purpose of screening for the presence of  diabetes:  <5.7%     Consistent with the absence of diabetes  5.7-6.4%  Consistent with increasing risk for diabetes   (prediabetes)  >or=6.5%  Consistent with diabetes  Currently, no consensus exists for use of hemoglobin A1c  for diagnosis of diabetes for children.  This Hemoglobin A1c assay has significant interference with fetal   hemoglobin   (HbF). The results are invalid for patients with abnormal amounts of   HbF,   including those with known Hereditary Persistence   of Fetal Hemoglobin. Heterozygous hemoglobin variants (HbAS, HbAC,   HbAD, HbAE, HbA2) do not significantly interfere with this assay;   however, presence of multiple variants in a sample may impact the %   interference.         Review of Systems   Constitutional: Negative for chills and fever.   Cardiovascular:  Negative for claudication and leg swelling.   Skin:  Positive for color change and nail changes.   Musculoskeletal:  Positive for myalgias. Negative for joint pain, joint swelling, muscle cramps and muscle weakness.   Gastrointestinal:  Negative for nausea and vomiting.   Neurological:  Negative for numbness and paresthesias.   Psychiatric/Behavioral:  Negative for altered mental status.         Objective:     Physical Exam  Constitutional:       Appearance: Normal appearance. He is not ill-appearing.   Cardiovascular:      Pulses:           Dorsalis pedis pulses are 2+ on the right side and 2+ on the left side.        Posterior tibial pulses are 2+ on the right side and 2+ on the left side.      Comments: CFT is < 3 seconds bilateral.  Pedal hair growth is present bilateral.  No lower extremity edema noted bilateral.  Toes are warm to touch bilateral.    Musculoskeletal:         General: Tenderness present. No deformity or signs of injury.      Right lower leg: No edema.      Left lower leg: No edema.      Comments: Muscle strength 5/5 in all muscle groups bilateral.  No tenderness nor crepitation with ROM of foot/ankle joints  "bilateral.  (+) for pain with palpation to the bursa overlying the Lt. Retrocalcaneal exostosis.  Lt. Sided gastrocnemius equinus with significant improvement in dorsiflexion.  (+) for pain with palpation to the Lt. Achilles tendon at its insertion.  (-) dell or defect noted to said tendon.     Skin:     General: Skin is warm and dry.      Capillary Refill: Capillary refill takes 2 to 3 seconds.      Findings: No bruising, ecchymosis, erythema, signs of injury, laceration, lesion, petechiae, rash or wound.      Comments: Pedal skin has normal turgor, temperature, and texture bilateral.  Significant mycotic changes noted to the Lt. Hallux toenail.  Remaining toenails x 9 appear normotrophic.    Neurological:      General: No focal deficit present.      Mental Status: He is alert.      Sensory: No sensory deficit.      Motor: No weakness or atrophy.      Comments: Light touch is intact bilateral.             Assessment:      Encounter Diagnoses   Name Primary?    Postcalcaneal bursitis of left foot Yes    Juana's deformity of left heel     Gastrocnemius equinus, left          Plan:     Eric Razo" was seen today for foot pain.    Diagnoses and all orders for this visit:    Postcalcaneal bursitis of left foot  -     Case Request Operating Room: EXCISION, EXOSTOSIS, RETROCALCANEAL, RECESSION, GASTROCNEMIUS, ENDOSCOPIC    Juana's deformity of left heel  -     Case Request Operating Room: EXCISION, EXOSTOSIS, RETROCALCANEAL, RECESSION, GASTROCNEMIUS, ENDOSCOPIC    Gastrocnemius equinus, left  -     Case Request Operating Room: EXCISION, EXOSTOSIS, RETROCALCANEAL, RECESSION, GASTROCNEMIUS, ENDOSCOPIC          I counseled the patient on his conditions, their implications and medical management.    - Being that past injections have provided relief for only several weeks at a time, I advise against further steroid use.       - To continue performing stretching exercises to address bilateral equinus.     - Recommend " wearing supportive shoes only.  Discussed avoidance of barefoot walking, flip flops, and Crocs, as this will exacerbate current symptoms.      - Discussed avoidance of high impact activities such as squatting, stooping, and running as these activities will exacerbate symptoms.       - Discussed surgical options in depth, I believe we could remove the haglunds bump and the cyst from a lateral incisional approach without having to remove the achilles attachment, we could also debride the bursa to this area. Will also do a gastroc recession. Discussed the need to remain in the tall boot post op for 6 weeks. He wants to proceed with this plan    Plan for surgery 5/29/25    Referred to PCP for medical optimization and risk stratification    Upon reviewing the risks/benefits,  the planned procedure, the surgical goal, and the postoperative course for the Lt. Foot haglunds resection and gastroc recession, the written consent was signed, timed, and dated by the patient with a witness present.      Return 1 week post op    Jared Noriega DPM

## 2025-05-23 ENCOUNTER — OFFICE VISIT (OUTPATIENT)
Dept: FAMILY MEDICINE | Facility: CLINIC | Age: 80
End: 2025-05-23
Payer: MEDICARE

## 2025-05-23 ENCOUNTER — LAB VISIT (OUTPATIENT)
Dept: LAB | Facility: HOSPITAL | Age: 80
End: 2025-05-23
Attending: INTERNAL MEDICINE
Payer: MEDICARE

## 2025-05-23 VITALS
RESPIRATION RATE: 16 BRPM | TEMPERATURE: 98 F | WEIGHT: 175.25 LBS | HEART RATE: 76 BPM | BODY MASS INDEX: 24.53 KG/M2 | HEIGHT: 71 IN | OXYGEN SATURATION: 98 % | SYSTOLIC BLOOD PRESSURE: 132 MMHG | DIASTOLIC BLOOD PRESSURE: 78 MMHG

## 2025-05-23 DIAGNOSIS — I10 ESSENTIAL HYPERTENSION: ICD-10-CM

## 2025-05-23 DIAGNOSIS — J30.9 CHRONIC ALLERGIC RHINITIS: ICD-10-CM

## 2025-05-23 DIAGNOSIS — K58.9 IRRITABLE BOWEL SYNDROME, UNSPECIFIED TYPE: ICD-10-CM

## 2025-05-23 DIAGNOSIS — M25.59 PAIN IN OTHER SPECIFIED JOINT: ICD-10-CM

## 2025-05-23 DIAGNOSIS — M62.462 GASTROCNEMIUS EQUINUS OF LEFT LOWER EXTREMITY: ICD-10-CM

## 2025-05-23 DIAGNOSIS — M92.62 HAGLUND DEFORMITY OF LEFT HEEL: ICD-10-CM

## 2025-05-23 DIAGNOSIS — M77.52 POSTCALCANEAL BURSITIS OF LEFT FOOT: ICD-10-CM

## 2025-05-23 DIAGNOSIS — Z01.818 PRE-OP EVALUATION: Primary | ICD-10-CM

## 2025-05-23 DIAGNOSIS — F51.01 PRIMARY INSOMNIA: ICD-10-CM

## 2025-05-23 DIAGNOSIS — Z01.818 PRE-OP EVALUATION: ICD-10-CM

## 2025-05-23 DIAGNOSIS — E03.9 HYPOTHYROIDISM, UNSPECIFIED TYPE: ICD-10-CM

## 2025-05-23 DIAGNOSIS — N40.0 BENIGN NON-NODULAR PROSTATIC HYPERPLASIA WITHOUT LOWER URINARY TRACT SYMPTOMS: ICD-10-CM

## 2025-05-23 DIAGNOSIS — E78.5 HYPERLIPIDEMIA, UNSPECIFIED HYPERLIPIDEMIA TYPE: ICD-10-CM

## 2025-05-23 LAB
ABSOLUTE EOSINOPHIL (OHS): 0.13 K/UL
ABSOLUTE MONOCYTE (OHS): 0.59 K/UL (ref 0.3–1)
ABSOLUTE NEUTROPHIL COUNT (OHS): 5.04 K/UL (ref 1.8–7.7)
BASOPHILS # BLD AUTO: 0.03 K/UL
BASOPHILS NFR BLD AUTO: 0.4 %
ERYTHROCYTE [DISTWIDTH] IN BLOOD BY AUTOMATED COUNT: 13.3 % (ref 11.5–14.5)
HCT VFR BLD AUTO: 42.1 % (ref 40–54)
HGB BLD-MCNC: 14.5 GM/DL (ref 14–18)
IMM GRANULOCYTES # BLD AUTO: 0.07 K/UL (ref 0–0.04)
IMM GRANULOCYTES NFR BLD AUTO: 0.9 % (ref 0–0.5)
INR PPP: 1.1 (ref 0.8–1.2)
LYMPHOCYTES # BLD AUTO: 2.14 K/UL (ref 1–4.8)
MCH RBC QN AUTO: 31 PG (ref 27–31)
MCHC RBC AUTO-ENTMCNC: 34.4 G/DL (ref 32–36)
MCV RBC AUTO: 90 FL (ref 82–98)
NUCLEATED RBC (/100WBC) (OHS): 0 /100 WBC
OHS QRS DURATION: 98 MS
OHS QTC CALCULATION: 449 MS
PLATELET # BLD AUTO: 245 K/UL (ref 150–450)
PMV BLD AUTO: 10.7 FL (ref 9.2–12.9)
PROTHROMBIN TIME: 11.9 SECONDS (ref 9–12.5)
RBC # BLD AUTO: 4.67 M/UL (ref 4.6–6.2)
RELATIVE EOSINOPHIL (OHS): 1.6 %
RELATIVE LYMPHOCYTE (OHS): 26.8 % (ref 18–48)
RELATIVE MONOCYTE (OHS): 7.4 % (ref 4–15)
RELATIVE NEUTROPHIL (OHS): 62.9 % (ref 38–73)
TSH SERPL-ACNC: 1.97 UIU/ML (ref 0.4–4)
WBC # BLD AUTO: 8 K/UL (ref 3.9–12.7)

## 2025-05-23 PROCEDURE — 80053 COMPREHEN METABOLIC PANEL: CPT

## 2025-05-23 PROCEDURE — 84443 ASSAY THYROID STIM HORMONE: CPT

## 2025-05-23 PROCEDURE — 85610 PROTHROMBIN TIME: CPT

## 2025-05-23 PROCEDURE — 85025 COMPLETE CBC W/AUTO DIFF WBC: CPT

## 2025-05-23 PROCEDURE — 36415 COLL VENOUS BLD VENIPUNCTURE: CPT | Mod: PN

## 2025-05-23 NOTE — PROGRESS NOTES
Subjective:       Patient ID: Eric Buitrago is a 79 y.o. male.    Medication List with Changes/Refills   Current Medications    AMLODIPINE (NORVASC) 2.5 MG TABLET    Take 1 pill at night    AZELASTINE (ASTELIN) 137 MCG (0.1 %) NASAL SPRAY    SPRAY 1 SPRAY IN EACH NOSTRIL TWICE DAILY    DOXEPIN (SINEQUAN) 50 MG CAPSULE    TAKE 2 CAPSULES BY MOUTH 1 TIME IN THE EVENING    FEXOFENADINE (ALLEGRA) 60 MG TABLET    Take 1 tablet by mouth daily as needed.    GABAPENTIN (NEURONTIN) 300 MG CAPSULE    TAKE 1 CAPSULE(300 MG) BY MOUTH EVERY EVENING    LEVOTHYROXINE (SYNTHROID) 137 MCG TAB TABLET    TAKE 1 TABLET(137 MCG) BY MOUTH BEFORE BREAKFAST    LOSARTAN (COZAAR) 50 MG TABLET    TAKE 1 TABLET(50 MG) BY MOUTH EVERY DAY    MELOXICAM (MOBIC) 15 MG TABLET    TAKE 1 TABLET(15 MG) BY MOUTH EVERY DAY    ROSUVASTATIN (CRESTOR) 5 MG TABLET    TAKE 1 TABLET(5 MG) BY MOUTH EVERY DAY    TAMSULOSIN (FLOMAX) 0.4 MG CAP    TAKE 2 CAPSULES(0.8 MG) BY MOUTH EVERY EVENING    TEMAZEPAM (RESTORIL) 15 MG CAP    Take 1 capsule (15 mg total) by mouth every night at bedtime.   Discontinued Medications    AMLODIPINE (NORVASC) 5 MG TABLET    Take 1 tablet (5 mg total) by mouth once daily.    HYDROCODONE-ACETAMINOPHEN (NORCO) 5-325 MG PER TABLET    Take 1 tablet by mouth every 8 (eight) hours as needed for Pain.       Chief Complaint: surgical clearance (Left foot    Dr Noriega  5/29)  He is here today for a preop evaluation.     He has chronic left ankle pain due to a postcalcaneal bursitis, Juana's deformity in the heel and left gastrocnemius equinus. On 5/29/2025 he is scheduled for an excision of the exostosis and retrocalcaneal recession.He will then be in a walking boot for 6 weeks.     He has hypertension and is taking losartan 50 mg qday and amlodipine 5 mg in the am and 2.5 mg in the pm. He denies any CAD. He denies chest pain or shortness of breath. Home BP run 130s/70s.     He has hyperlipidemia and is taking crestor 5 mg qday. His  lipids on 7/2024 were 123/78/39/68.      He has hypothyroid that is controlled on levothyroxine 137 mcg qday. His last TSH was slow on 1/2025 at 4.2 (it was normal on 8/2024).        He has BPH which is controlled with flomax 0.8 mg nightly. He says this helps with his frequency and nocturia. PSA on 7/2024 was 2.0.       He has seasonal allergies controlled with allegra as needed and astelin which he takes daily.  He denies any active symptoms.         He has IBS for many years. He was seen by GI about 10 years ago and started on doxepin for spastic colon.  He says this has worked and he no longer has any symptoms.  His most recent colonoscopy on 2/2018 had no polyps but did show mild colonic spasms. He continues on doxepin to 100 mg daily and feels his symptoms are controlled on this dose.      He has insomnia that is controlled with PRN temazepam 15 mg 1/2 pill to 1 pill nightly.  He says it works and denies side effects.          He occasionally has low back pain if he over exerts himself. He will take mobic with good relief.  He is pain free today. Pain worse with bending or lifting.  Better with stretching and rest.       He has shoulder pain with intermittent radiation down left arm. He was seen by pain management and had an MRI on 1/2019 showing Multilevel spondylosis, greatest at C5-6 where there is mild cord flattening with effacement of ventral and dorsal CSF.  No cord signal abnormality.Multilevel foraminal stenosis, greatest at C5-6 where it is moderate-severe bilaterally. Moderate multilevel hypertrophic facet arthropathy.  He was seen by pain and started on gabaepntin 300 mg qhs which has helped with his pain.  If worsens then pain would like to do a cervical steroid injection.  He completed PT without much difference in his pain.  He reports gabapentin 300 mg nightly completely controls his pain.       He has osteoarthritis of both hands left > right. 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 He says years ago he had a steroid injection in his thumb that helped a similar problem. Rheumatologic workup was negative on 6/2021.          Review of Systems   Constitutional:  Negative for appetite change, fatigue, fever and unexpected weight change.   HENT:  Negative for congestion, ear pain, hearing loss, sore throat and trouble swallowing.    Eyes:  Negative for pain and visual disturbance.   Respiratory:  Negative for cough, chest tightness, shortness of breath and wheezing.    Cardiovascular:  Negative for chest pain, palpitations and leg swelling.   Gastrointestinal:  Negative for abdominal pain, blood in stool, constipation, diarrhea, nausea and vomiting.   Endocrine: Negative for polyuria.   Genitourinary:  Negative for dysuria and hematuria.   Musculoskeletal:  Positive for arthralgias. Negative for back pain and myalgias.   Skin:  Negative for rash.   Allergic/Immunologic: Positive for environmental allergies.   Neurological:  Negative for dizziness, weakness, numbness and headaches.   Hematological:  Does not bruise/bleed easily.   Psychiatric/Behavioral:  Negative for dysphoric mood, sleep disturbance and suicidal ideas. The patient is not nervous/anxious.        Objective:      Vitals:    05/23/25 1037   BP: 132/78   Pulse: 76   Resp: 16   Temp: 98.4 °F (36.9 °C)   TempSrc: Temporal   SpO2: 98%   Weight: 79.5 kg (175 lb 4.3 oz)   Height: 5' 11" (1.803 m)     Body mass index is 24.44 kg/m².  Physical Exam    General appearance: No acute distress, cooperative  Eyes: PERRL, EOMI, conjunctiva clear  Ears: normal external ear and pinna, tm clear without drainage, canals clear  Nose: Normal mucosa without drainage  Throat: no exudates or erythema, tonsils not enlarged  Mouth: no sores or lesions, moist mucous membranes  Neck: FROM, soft, supple, no thyromegaly, no bruits  Lymph: no anterior or posterior cervical adenopathy  Heart::  Regular rate " and rhythm, no murmur  Lung: Clear to ascultation bilaterally, no wheezing, no rales, no rhonchi, no distress  Abdomen: Soft, nontender, no distention, no hepatosplenomegaly, bowel sounds normal, no guarding, no rebound, no peritoneal signs  Skin: no rashes, no lesions  Extremities: no edema, no cyanosis  Neuro: CN 2-12 intact, 5/5 muscle strength upper and lower extremity bilaterally, 2+ DTRs UE and LE bilaterally, normal gait  Peripheral pulses: 2+ pedal pulses bilaterally, good perfusion and color  Musculoskeletal: FROM, good strenth, no tenderness  Joint: normal appearance, no swelling, no warmth, no deformity in all joints    Assessment:       1. Pre-op evaluation    2. Postcalcaneal bursitis of left foot    3. Juana deformity of left heel    4. Gastrocnemius equinus of left lower extremity    5. Essential hypertension    6. Hyperlipidemia, unspecified hyperlipidemia type    7. Chronic allergic rhinitis    8. Hypothyroidism, unspecified type    9. Irritable bowel syndrome, unspecified type    10. Benign non-nodular prostatic hyperplasia without lower urinary tract symptoms    11. Primary insomnia    12. Pain in other specified joint        Plan:       Pre-op evaluation for postcalcaneal bursitis of left foot/Juana deformity of left heel/Gastrocnemius equinus of left lower extremity  EKG looks good today. Will get labs. He is a low cardiac risk and if labs look good then will clear for surgery.   -     IN OFFICE EKG 12-LEAD (to [facility])  -     CBC Auto Differential; Future; Expected date: 05/23/2025  -     Comprehensive Metabolic Panel; Future; Expected date: 05/23/2025    Essential hypertension  Well controlled and continue current regimen.   -     Protime-INR; Future; Expected date: 05/23/2025    Hyperlipidemia, unspecified hyperlipidemia type  Good control on crestor    Chronic allergic rhinitis  Well controlled and continue current regimen.     Hypothyroidism, unspecified type  Mildly abnormal on last labs.  Will recheck today with labs.   -     TSH; Future; Expected date: 05/23/2025    Irritable bowel syndrome, unspecified type  Well controlled on doxepin    Benign non-nodular prostatic hyperplasia without lower urinary tract symptoms  Good control on flomax    Primary insomnia  Doing well on temazepam    Pain in other specified joint  -     Protime-INR; Future; Expected date: 05/23/2025    Follow up in about 2 months (around 7/23/2025) for chronic medical issues, already scheduled.

## 2025-05-24 LAB
ALBUMIN SERPL BCP-MCNC: 4.2 G/DL (ref 3.5–5.2)
ALP SERPL-CCNC: 60 UNIT/L (ref 40–150)
ALT SERPL W/O P-5'-P-CCNC: 22 UNIT/L (ref 10–44)
ANION GAP (OHS): 13 MMOL/L (ref 8–16)
AST SERPL-CCNC: 20 UNIT/L (ref 11–45)
BILIRUB SERPL-MCNC: 0.6 MG/DL (ref 0.1–1)
BUN SERPL-MCNC: 23 MG/DL (ref 8–23)
CALCIUM SERPL-MCNC: 9.4 MG/DL (ref 8.7–10.5)
CHLORIDE SERPL-SCNC: 105 MMOL/L (ref 95–110)
CO2 SERPL-SCNC: 21 MMOL/L (ref 23–29)
CREAT SERPL-MCNC: 1 MG/DL (ref 0.5–1.4)
GFR SERPLBLD CREATININE-BSD FMLA CKD-EPI: >60 ML/MIN/1.73/M2
GLUCOSE SERPL-MCNC: 100 MG/DL (ref 70–110)
POTASSIUM SERPL-SCNC: 4.5 MMOL/L (ref 3.5–5.1)
PROT SERPL-MCNC: 7.2 GM/DL (ref 6–8.4)
SODIUM SERPL-SCNC: 139 MMOL/L (ref 136–145)

## 2025-05-25 ENCOUNTER — PATIENT MESSAGE (OUTPATIENT)
Dept: FAMILY MEDICINE | Facility: CLINIC | Age: 80
End: 2025-05-25
Payer: MEDICARE

## 2025-05-28 ENCOUNTER — ANESTHESIA EVENT (OUTPATIENT)
Dept: SURGERY | Facility: HOSPITAL | Age: 80
End: 2025-05-28
Payer: MEDICARE

## 2025-05-29 ENCOUNTER — HOSPITAL ENCOUNTER (OUTPATIENT)
Dept: RADIOLOGY | Facility: HOSPITAL | Age: 80
Discharge: HOME | End: 2025-05-29
Attending: PODIATRIST | Admitting: PODIATRIST
Payer: MEDICARE

## 2025-05-29 ENCOUNTER — ANESTHESIA (OUTPATIENT)
Dept: SURGERY | Facility: HOSPITAL | Age: 80
End: 2025-05-29
Payer: MEDICARE

## 2025-05-29 ENCOUNTER — HOSPITAL ENCOUNTER (OUTPATIENT)
Facility: HOSPITAL | Age: 80
Discharge: HOME | End: 2025-05-29
Attending: PODIATRIST | Admitting: PODIATRIST
Payer: MEDICARE

## 2025-05-29 DIAGNOSIS — M89.8X7 RETROCALCANEAL EXOSTOSIS: Primary | ICD-10-CM

## 2025-05-29 DIAGNOSIS — M92.62 HAGLUND'S DEFORMITY OF LEFT HEEL: ICD-10-CM

## 2025-05-29 DIAGNOSIS — M62.462 GASTROCNEMIUS EQUINUS OF LEFT LOWER EXTREMITY: ICD-10-CM

## 2025-05-29 PROCEDURE — 63600175 PHARM REV CODE 636 W HCPCS: Mod: PO | Performed by: PODIATRIST

## 2025-05-29 PROCEDURE — 36000708 HC OR TIME LEV III 1ST 15 MIN: Mod: PO | Performed by: PODIATRIST

## 2025-05-29 PROCEDURE — 63600175 PHARM REV CODE 636 W HCPCS: Mod: PO | Performed by: NURSE ANESTHETIST, CERTIFIED REGISTERED

## 2025-05-29 PROCEDURE — 36000709 HC OR TIME LEV III EA ADD 15 MIN: Mod: PO | Performed by: PODIATRIST

## 2025-05-29 PROCEDURE — 64445 NJX AA&/STRD SCIATIC NRV IMG: CPT | Mod: PO | Performed by: ANESTHESIOLOGY

## 2025-05-29 PROCEDURE — 27200651 HC AIRWAY, LMA: Mod: PO | Performed by: ANESTHESIOLOGY

## 2025-05-29 PROCEDURE — 27201423 OPTIME MED/SURG SUP & DEVICES STERILE SUPPLY: Mod: PO | Performed by: PODIATRIST

## 2025-05-29 PROCEDURE — 73630 X-RAY EXAM OF FOOT: CPT | Mod: TC,FY,PO,LT

## 2025-05-29 PROCEDURE — 27687 REVISION OF CALF TENDON: CPT | Mod: LT,,, | Performed by: PODIATRIST

## 2025-05-29 PROCEDURE — 63600175 PHARM REV CODE 636 W HCPCS: Mod: JZ,TB,PO | Performed by: ANESTHESIOLOGY

## 2025-05-29 PROCEDURE — 37000009 HC ANESTHESIA EA ADD 15 MINS: Mod: PO | Performed by: PODIATRIST

## 2025-05-29 PROCEDURE — 73630 X-RAY EXAM OF FOOT: CPT | Mod: 26,LT,, | Performed by: RADIOLOGY

## 2025-05-29 PROCEDURE — 25000003 PHARM REV CODE 250: Mod: PO | Performed by: NURSE ANESTHETIST, CERTIFIED REGISTERED

## 2025-05-29 PROCEDURE — 71000033 HC RECOVERY, INTIAL HOUR: Mod: PO | Performed by: PODIATRIST

## 2025-05-29 PROCEDURE — 63600175 PHARM REV CODE 636 W HCPCS: Mod: PO | Performed by: ANESTHESIOLOGY

## 2025-05-29 PROCEDURE — 37000008 HC ANESTHESIA 1ST 15 MINUTES: Mod: PO | Performed by: PODIATRIST

## 2025-05-29 PROCEDURE — 28118 REMOVAL OF HEEL BONE: CPT | Mod: 51,LT,, | Performed by: PODIATRIST

## 2025-05-29 PROCEDURE — 27200750 HC INSULATED NEEDLE/ STIMUPLEX: Mod: PO | Performed by: ANESTHESIOLOGY

## 2025-05-29 PROCEDURE — 71000015 HC POSTOP RECOV 1ST HR: Mod: PO | Performed by: PODIATRIST

## 2025-05-29 RX ORDER — EPHEDRINE SULFATE 50 MG/ML
INJECTION, SOLUTION INTRAVENOUS
Status: DISCONTINUED | OUTPATIENT
Start: 2025-05-29 | End: 2025-05-29

## 2025-05-29 RX ORDER — DEXAMETHASONE SODIUM PHOSPHATE 4 MG/ML
8 INJECTION, SOLUTION INTRA-ARTICULAR; INTRALESIONAL; INTRAMUSCULAR; INTRAVENOUS; SOFT TISSUE
Status: COMPLETED | OUTPATIENT
Start: 2025-05-29 | End: 2025-05-29

## 2025-05-29 RX ORDER — LIDOCAINE HYDROCHLORIDE 10 MG/ML
1 INJECTION, SOLUTION EPIDURAL; INFILTRATION; INTRACAUDAL; PERINEURAL ONCE
Status: DISCONTINUED | OUTPATIENT
Start: 2025-05-29 | End: 2025-05-29 | Stop reason: HOSPADM

## 2025-05-29 RX ORDER — OXYCODONE HYDROCHLORIDE 5 MG/1
5 TABLET ORAL
Status: DISCONTINUED | OUTPATIENT
Start: 2025-05-29 | End: 2025-05-29 | Stop reason: HOSPADM

## 2025-05-29 RX ORDER — FENTANYL CITRATE 50 UG/ML
INJECTION, SOLUTION INTRAMUSCULAR; INTRAVENOUS
Status: DISCONTINUED | OUTPATIENT
Start: 2025-05-29 | End: 2025-05-29

## 2025-05-29 RX ORDER — CEFAZOLIN SODIUM 1 G/3ML
2 INJECTION, POWDER, FOR SOLUTION INTRAMUSCULAR; INTRAVENOUS ONCE
Status: COMPLETED | OUTPATIENT
Start: 2025-05-29 | End: 2025-05-29

## 2025-05-29 RX ORDER — HYDROMORPHONE HYDROCHLORIDE 2 MG/ML
0.2 INJECTION, SOLUTION INTRAMUSCULAR; INTRAVENOUS; SUBCUTANEOUS EVERY 5 MIN PRN
Status: DISCONTINUED | OUTPATIENT
Start: 2025-05-29 | End: 2025-05-29 | Stop reason: HOSPADM

## 2025-05-29 RX ORDER — FENTANYL CITRATE 50 UG/ML
25 INJECTION, SOLUTION INTRAMUSCULAR; INTRAVENOUS EVERY 5 MIN PRN
Status: DISCONTINUED | OUTPATIENT
Start: 2025-05-29 | End: 2025-05-29 | Stop reason: HOSPADM

## 2025-05-29 RX ORDER — MIDAZOLAM HYDROCHLORIDE 1 MG/ML
0.5 INJECTION, SOLUTION INTRAMUSCULAR; INTRAVENOUS
Status: DISCONTINUED | OUTPATIENT
Start: 2025-05-29 | End: 2025-05-29 | Stop reason: HOSPADM

## 2025-05-29 RX ORDER — GLUCAGON 1 MG
1 KIT INJECTION
Status: DISCONTINUED | OUTPATIENT
Start: 2025-05-29 | End: 2025-05-29 | Stop reason: HOSPADM

## 2025-05-29 RX ORDER — BUPIVACAINE HYDROCHLORIDE 5 MG/ML
INJECTION, SOLUTION EPIDURAL; INTRACAUDAL; PERINEURAL
Status: DISCONTINUED | OUTPATIENT
Start: 2025-05-29 | End: 2025-05-29

## 2025-05-29 RX ORDER — BUPIVACAINE 13.3 MG/ML
INJECTION, SUSPENSION, LIPOSOMAL INFILTRATION
Status: DISCONTINUED | OUTPATIENT
Start: 2025-05-29 | End: 2025-05-29

## 2025-05-29 RX ORDER — MIDAZOLAM HYDROCHLORIDE 1 MG/ML
INJECTION INTRAMUSCULAR; INTRAVENOUS
Status: DISCONTINUED | OUTPATIENT
Start: 2025-05-29 | End: 2025-05-29

## 2025-05-29 RX ORDER — PROPOFOL 10 MG/ML
VIAL (ML) INTRAVENOUS
Status: DISCONTINUED | OUTPATIENT
Start: 2025-05-29 | End: 2025-05-29

## 2025-05-29 RX ORDER — SODIUM CHLORIDE, SODIUM LACTATE, POTASSIUM CHLORIDE, CALCIUM CHLORIDE 600; 310; 30; 20 MG/100ML; MG/100ML; MG/100ML; MG/100ML
INJECTION, SOLUTION INTRAVENOUS CONTINUOUS
Status: DISCONTINUED | OUTPATIENT
Start: 2025-05-29 | End: 2025-05-29 | Stop reason: HOSPADM

## 2025-05-29 RX ORDER — OXYCODONE AND ACETAMINOPHEN 5; 325 MG/1; MG/1
1 TABLET ORAL EVERY 4 HOURS PRN
Qty: 15 TABLET | Refills: 0 | Status: SHIPPED | OUTPATIENT
Start: 2025-05-29

## 2025-05-29 RX ORDER — ACETAMINOPHEN 10 MG/ML
INJECTION, SOLUTION INTRAVENOUS
Status: DISCONTINUED | OUTPATIENT
Start: 2025-05-29 | End: 2025-05-29

## 2025-05-29 RX ORDER — ONDANSETRON HYDROCHLORIDE 2 MG/ML
INJECTION, SOLUTION INTRAVENOUS
Status: DISCONTINUED | OUTPATIENT
Start: 2025-05-29 | End: 2025-05-29

## 2025-05-29 RX ORDER — LIDOCAINE HYDROCHLORIDE 20 MG/ML
INJECTION INTRAVENOUS
Status: DISCONTINUED | OUTPATIENT
Start: 2025-05-29 | End: 2025-05-29

## 2025-05-29 RX ADMIN — PROPOFOL 150 MG: 10 INJECTION, EMULSION INTRAVENOUS at 09:05

## 2025-05-29 RX ADMIN — MIDAZOLAM 2 MG: 1 INJECTION INTRAMUSCULAR; INTRAVENOUS at 09:05

## 2025-05-29 RX ADMIN — EPHEDRINE SULFATE 5 MG: 50 INJECTION INTRAVENOUS at 10:05

## 2025-05-29 RX ADMIN — FENTANYL CITRATE 50 MCG: 50 INJECTION, SOLUTION INTRAMUSCULAR; INTRAVENOUS at 09:05

## 2025-05-29 RX ADMIN — ONDANSETRON 4 MG: 2 INJECTION, SOLUTION INTRAMUSCULAR; INTRAVENOUS at 10:05

## 2025-05-29 RX ADMIN — BUPIVACAINE HYDROCHLORIDE 5 ML: 5 INJECTION, SOLUTION EPIDURAL; INTRACAUDAL; PERINEURAL at 09:05

## 2025-05-29 RX ADMIN — MIDAZOLAM HYDROCHLORIDE 2 MG: 2 INJECTION, SOLUTION INTRAMUSCULAR; INTRAVENOUS at 09:05

## 2025-05-29 RX ADMIN — BUPIVACAINE 7 ML: 13.3 INJECTION, SUSPENSION, LIPOSOMAL INFILTRATION at 09:05

## 2025-05-29 RX ADMIN — GLYCOPYRROLATE 0.2 MG: 0.2 INJECTION, SOLUTION INTRAMUSCULAR; INTRAVENOUS at 10:05

## 2025-05-29 RX ADMIN — BUPIVACAINE HYDROCHLORIDE 10 ML: 5 INJECTION, SOLUTION EPIDURAL; INTRACAUDAL; PERINEURAL at 09:05

## 2025-05-29 RX ADMIN — CEFAZOLIN 2 G: 330 INJECTION, POWDER, FOR SOLUTION INTRAMUSCULAR; INTRAVENOUS at 09:05

## 2025-05-29 RX ADMIN — SODIUM CHLORIDE, POTASSIUM CHLORIDE, SODIUM LACTATE AND CALCIUM CHLORIDE: 600; 310; 30; 20 INJECTION, SOLUTION INTRAVENOUS at 08:05

## 2025-05-29 RX ADMIN — BUPIVACAINE 13 ML: 13.3 INJECTION, SUSPENSION, LIPOSOMAL INFILTRATION at 09:05

## 2025-05-29 RX ADMIN — DEXAMETHASONE SODIUM PHOSPHATE 8 MG: 4 INJECTION, SOLUTION INTRAMUSCULAR; INTRAVENOUS at 09:05

## 2025-05-29 RX ADMIN — FENTANYL CITRATE 50 MCG: 50 INJECTION INTRAMUSCULAR; INTRAVENOUS at 09:05

## 2025-05-29 RX ADMIN — ACETAMINOPHEN 1000 MG: 10 INJECTION INTRAVENOUS at 09:05

## 2025-05-29 RX ADMIN — LIDOCAINE HYDROCHLORIDE 100 MG: 20 INJECTION INTRAVENOUS at 09:05

## 2025-05-29 NOTE — ANESTHESIA POSTPROCEDURE EVALUATION
Anesthesia Post Evaluation    Patient: Eric Buitrago    Procedure(s) Performed: Procedure(s) (LRB):  EXCISION, EXOSTOSIS, RETROCALCANEAL (Left)  RECESSION, GASTROCNEMIUS, ENDOSCOPIC (Left)    Final Anesthesia Type: general      Patient location during evaluation: PACU  Patient participation: Yes- Able to Participate  Level of consciousness: awake and alert  Post-procedure vital signs: reviewed and stable  Pain management: adequate  Airway patency: patent    PONV status at discharge: No PONV  Anesthetic complications: no      Cardiovascular status: hemodynamically stable  Respiratory status: unassisted and room air  Hydration status: euvolemic  Follow-up not needed.              Vitals Value Taken Time   /84 05/29/25 11:41   Temp 36.2 °C (97.1 °F) 05/29/25 11:14   Pulse 63 05/29/25 11:41   Resp 16 05/29/25 11:41   SpO2 100 % 05/29/25 11:41         Event Time   Out of Recovery 11:48:54         Pain/Noy Score: Pain Rating Prior to Med Admin: 5 (5/29/2025  9:19 AM)  Pain Rating Post Med Admin: 0 (5/29/2025  9:35 AM)  Noy Score: 8 (5/29/2025 11:03 AM)

## 2025-05-29 NOTE — TRANSFER OF CARE
"Anesthesia Transfer of Care Note    Patient: Eric Buitrago    Procedure(s) Performed: Procedure(s) (LRB):  EXCISION, EXOSTOSIS, RETROCALCANEAL (Left)  RECESSION, GASTROCNEMIUS, ENDOSCOPIC (Left)    Patient location: PACU    Anesthesia Type: general    Transport from OR: Transported from OR on room air with adequate spontaneous ventilation    Post pain: adequate analgesia    Post assessment: no apparent anesthetic complications and tolerated procedure well    Post vital signs: stable    Level of consciousness: responds to stimulation    Nausea/Vomiting: no nausea/vomiting    Complications: none    Transfer of care protocol was followed      Last vitals: Visit Vitals  BP (!) 140/66   Pulse 72   Temp 36.2 °C (97.2 °F) (Skin)   Resp 12   Ht 5' 11" (1.803 m)   Wt 80.7 kg (178 lb)   SpO2 98%   BMI 24.83 kg/m²     "

## 2025-05-29 NOTE — ANESTHESIA PROCEDURE NOTES
LMA insertion    Date/Time: 5/29/2025 9:49 AM    Performed by: Ai Sauer CRNA  Authorized by: Phan Barnhart MD    Intubation:     Induction:  Intravenous    Intubated:  Postinduction    Mask Ventilation:  Not attempted    Attempts:  1    Attempted By:  CRNA    Difficult Airway Encountered?: No      Complications:  None    Airway Device:  Supraglottic airway/LMA    Airway Device Size:  5.0    Style/Cuff Inflation:  Cuffed (inflated to minimal occlusive pressure)    Inflation Amount (mL):  6    Secured at:  The lips    Placement Verified By:  Capnometry    Complicating Factors:  None    Findings Post-Intubation:  BS equal bilateral and atraumatic/condition of teeth unchanged

## 2025-05-29 NOTE — ANESTHESIA PROCEDURE NOTES
Peripheral Block    Patient location during procedure: pre-op   Block not for primary anesthetic.  Reason for block: at surgeon's request and post-op pain management   Post-op Pain Location: left foot   Start time: 5/29/2025 9:20 AM  Timeout: 5/29/2025 9:20 AM   End time: 5/29/2025 9:26 AM    Staffing  Authorizing Provider: Phan Barnhart MD  Performing Provider: Phan Barnhart MD    Staffing  Performed by: Phan Barnhart MD  Authorized by: Phan Barnhart MD    Preanesthetic Checklist  Completed: patient identified, IV checked, site marked, risks and benefits discussed, surgical consent, monitors and equipment checked, pre-op evaluation and timeout performed  Peripheral Block  Patient position: supine  Prep: ChloraPrep  Patient monitoring: heart rate, cardiac monitor, continuous pulse ox, continuous capnometry and frequent blood pressure checks  Block type: popliteal  Laterality: left  Injection technique: single shot  Needle  Needle type: Stimuplex   Needle gauge: 21 G  Needle length: 4 in  Needle localization: anatomical landmarks and ultrasound guidance   -ultrasound image captured on disc.  Assessment  Injection assessment: negative aspiration, negative parasthesia and local visualized surrounding nerve  Paresthesia pain: none  Heart rate change: no  Slow fractionated injection: yes  Pain Tolerance: comfortable throughout block and no complaints  Medications:    Medications: bupivacaine (pf) (MARCAINE) injection 0.5% - Perineural   10 mL - 5/29/2025 9:26:00 AM    Additional Notes  VSS.  [facility] RN monitoring vitals throughout procedure.  Patient tolerated procedure well.

## 2025-05-29 NOTE — ANESTHESIA PREPROCEDURE EVALUATION
05/29/2025  Eric Buitrago is a 79 y.o., male.      Pre-op Assessment    I have reviewed the Patient Summary Reports.     I have reviewed the Nursing Notes. I have reviewed the NPO Status.   I have reviewed the Medications.     Review of Systems  Anesthesia Hx:  No problems with previous Anesthesia                Social:  Former Smoker       Hematology/Oncology:  Hematology Normal   Oncology Normal                                   EENT/Dental:  EENT/Dental Normal           Cardiovascular:     Hypertension           hyperlipidemia                         Hypertension         Pulmonary:  Pulmonary Normal                       Musculoskeletal:     Bursitis of left foot  Juana's deformity of left heel             Endocrine:   Hypothyroidism       Hypothyroidism          Dermatological:  Skin Normal    Psych:  Psychiatric Normal                    Physical Exam  General: Well nourished, Cooperative, Alert and Oriented    Airway:  Mallampati: II   Mouth Opening: Normal  TM Distance: Normal  Tongue: Normal  Neck ROM: Normal ROM    Dental:  Intact    Chest/Lungs:  Normal Respiratory Rate    Heart:  Rate: Normal        Anesthesia Plan  Type of Anesthesia, risks & benefits discussed:    Anesthesia Type: Gen Supraglottic Airway  Intra-op Monitoring Plan: Standard ASA Monitors  Post Op Pain Control Plan: multimodal analgesia, IV/PO Opioids PRN and peripheral nerve block  Induction:  IV  Airway Plan: , Post-Induction  Informed Consent: Informed consent signed with the Patient and all parties understand the risks and agree with anesthesia plan.  All questions answered.   ASA Score: 2  Day of Surgery Review of History & Physical: H&P Update referred to the surgeon/provider.    Ready For Surgery From Anesthesia Perspective.     .

## 2025-05-30 VITALS
DIASTOLIC BLOOD PRESSURE: 84 MMHG | TEMPERATURE: 97 F | HEIGHT: 71 IN | WEIGHT: 178 LBS | BODY MASS INDEX: 24.92 KG/M2 | RESPIRATION RATE: 16 BRPM | HEART RATE: 63 BPM | OXYGEN SATURATION: 100 % | SYSTOLIC BLOOD PRESSURE: 144 MMHG

## 2025-06-04 ENCOUNTER — OFFICE VISIT (OUTPATIENT)
Dept: PODIATRY | Facility: CLINIC | Age: 80
End: 2025-06-04
Payer: MEDICARE

## 2025-06-04 VITALS — BODY MASS INDEX: 24.53 KG/M2 | HEIGHT: 71 IN | WEIGHT: 175.25 LBS

## 2025-06-04 DIAGNOSIS — Z98.890 POST-OPERATIVE STATE: Primary | ICD-10-CM

## 2025-06-04 PROCEDURE — 99999 PR PBB SHADOW E&M-EST. PATIENT-LVL III: CPT | Mod: PBBFAC,,, | Performed by: PODIATRIST

## 2025-06-11 ENCOUNTER — OFFICE VISIT (OUTPATIENT)
Dept: PODIATRY | Facility: CLINIC | Age: 80
End: 2025-06-11
Payer: MEDICARE

## 2025-06-11 VITALS — BODY MASS INDEX: 24.53 KG/M2 | WEIGHT: 175.25 LBS | HEIGHT: 71 IN

## 2025-06-11 DIAGNOSIS — Z98.890 POST-OPERATIVE STATE: Primary | ICD-10-CM

## 2025-06-11 PROCEDURE — 99999 PR PBB SHADOW E&M-EST. PATIENT-LVL III: CPT | Mod: PBBFAC,,, | Performed by: PODIATRIST

## 2025-06-11 NOTE — PROGRESS NOTES
Subjective:     Patient ID: Eric Buitrago is a 79 y.o. male.    Chief Complaint: Post-op Evaluation  Patient presents to clinic for a follow up regarding Lt. Posterior heel pain.  Notes continued pain with all weight bearing.  Symptoms improve slightly throughout the day, however, is really beginning to impede activities of daily living.  He continues wearing supportive shoes and is stretching as instructed.   He has failed past attempts at PT.  Inquires as to the next step in treatment.  Denies any additional pedal complaints.     4/15/25: patient returns for follow up left posterior heel pain that has been going on for about 2 years, he has seen Dr. Mcmullen who was able to do some injections and he was in a boot for a time which helped but wore off. He is here as a consult for possible surgical options.     5/6/25: patient return for follow up left posterior heel pain over the last couple years despite offloading in a boot, PT and antiinflammatories the area continues to hurt. Here to set up surgical intervention    6/4/25: Patient returns for left s/p 1 week retrocalcaneal exostectomy and gastroc recession, no new complaints. Doing well overall    6/11/25: Patient returns s/p 2 weeks retrocalcaneal exostectomy and gastroc recession walking in the boot no new complaints    Past Medical History:   Diagnosis Date    Allergy     Hyperlipidemia     Hypertension     Hypothyroid     IBS (irritable bowel syndrome)        Past Surgical History:   Procedure Laterality Date    CHOLECYSTECTOMY  2007    COLONOSCOPY  ~2007    RABITO.    COLONOSCOPY N/A 2/21/2018    Procedure: COLONOSCOPY;  Surgeon: Josue Myles Jr., MD;  Location: [facility] ENDO;  Service: Endoscopy;  Laterality: N/A;    ENDOSCOPIC GASTROCNEMIUS RECESSION Left 5/29/2025    Procedure: RECESSION, GASTROCNEMIUS, ENDOSCOPIC;  Surgeon: Jared Noriega DPM;  Location: [facility] OR;  Service: Podiatry;  Laterality: Left;  popliteal saphenous block, power, mini  c-arm, video and Casentric gastroc kit    SURGICAL REMOVAL OF RETROCALCANEAL EXOSTOSIS Left 2025    Procedure: EXCISION, EXOSTOSIS, RETROCALCANEAL;  Surgeon: Jared Noriega DPM;  Location: [facility] OR;  Service: Podiatry;  Laterality: Left;  popliteal saphenous block, power, mini c-arm       Family History   Problem Relation Name Age of Onset    Lymphoma Mother      Stroke Father      Breast cancer Sister      Leukemia Brother      Glaucoma Neg Hx      Macular degeneration Neg Hx      Retinal detachment Neg Hx         Social History     Socioeconomic History    Marital status:    Occupational History    Occupation: retired school psycholgist   Tobacco Use    Smoking status: Former     Current packs/day: 0.00     Average packs/day: 0.3 packs/day for 10.0 years (2.5 ttl pk-yrs)     Types: Cigarettes     Start date: 1973     Quit date: 1983     Years since quittin.9    Smokeless tobacco: Never   Substance and Sexual Activity    Alcohol use: Yes     Comment: occ    Drug use: No    Sexual activity: Not Currently     Social Drivers of Health     Financial Resource Strain: Low Risk  (2025)    Overall Financial Resource Strain (CARDIA)     Difficulty of Paying Living Expenses: Not hard at all   Food Insecurity: No Food Insecurity (2025)    Hunger Vital Sign     Worried About Running Out of Food in the Last Year: Never true     Ran Out of Food in the Last Year: Never true   Transportation Needs: No Transportation Needs (2025)    PRAPARE - Transportation     Lack of Transportation (Medical): No     Lack of Transportation (Non-Medical): No   Physical Activity: Insufficiently Active (2025)    Exercise Vital Sign     Days of Exercise per Week: 3 days     Minutes of Exercise per Session: 30 min   Stress: No Stress Concern Present (2025)    [employer] [employer] - Occupational Stress Questionnaire     Feeling of Stress : Only a little   Housing Stability: Low Risk   (5/22/2025)    Housing Stability Vital Sign     Unable to Pay for Housing in the Last Year: No     Number of Times Moved in the Last Year: 0     Homeless in the Last Year: No       Current Outpatient Medications   Medication Sig Dispense Refill    amLODIPine (NORVASC) 2.5 MG tablet Take 1 pill at night 90 tablet 3    azelastine (ASTELIN) 137 mcg (0.1 %) nasal spray SPRAY 1 SPRAY IN EACH NOSTRIL TWICE DAILY 90 mL 3    doxepin (SINEQUAN) 50 MG capsule TAKE 2 CAPSULES BY MOUTH 1 TIME IN THE EVENING 180 capsule 2    fexofenadine (ALLEGRA) 60 MG tablet Take 1 tablet by mouth daily as needed.      gabapentin (NEURONTIN) 300 MG capsule TAKE 1 CAPSULE(300 MG) BY MOUTH EVERY EVENING 90 capsule 2    levothyroxine (SYNTHROID) 137 MCG Tab tablet TAKE 1 TABLET(137 MCG) BY MOUTH BEFORE BREAKFAST 90 tablet 3    losartan (COZAAR) 50 MG tablet TAKE 1 TABLET(50 MG) BY MOUTH EVERY DAY 90 tablet 1    meloxicam (MOBIC) 15 MG tablet TAKE 1 TABLET(15 MG) BY MOUTH EVERY DAY 90 tablet 3    oxyCODONE-acetaminophen (PERCOCET) 5-325 mg per tablet Take 1 tablet by mouth every 4 (four) hours as needed for Pain. 15 tablet 0    rosuvastatin (CRESTOR) 5 MG tablet TAKE 1 TABLET(5 MG) BY MOUTH EVERY DAY 90 tablet 3    tamsulosin (FLOMAX) 0.4 mg Cap TAKE 2 CAPSULES(0.8 MG) BY MOUTH EVERY EVENING 180 capsule 1    temazepam (RESTORIL) 15 mg Cap Take 1 capsule (15 mg total) by mouth every night at bedtime. 90 capsule 1     No current facility-administered medications for this visit.       Review of patient's allergies indicates:   Allergen Reactions    No known drug allergies         Hemoglobin A1C   Date Value Ref Range Status   01/14/2025 4.7 4.0 - 5.6 % Final     Comment:     ADA Screening Guidelines:  5.7-6.4%  Consistent with prediabetes  >or=6.5%  Consistent with diabetes    High levels of fetal hemoglobin interfere with the HbA1C  assay. Heterozygous hemoglobin variants (HbS, HgC, etc)do  not significantly interfere with this assay.   However,  presence of multiple variants may affect accuracy.     08/15/2024 4.8 4.0 - 5.6 % Final     Comment:     ADA Screening Guidelines:  5.7-6.4%  Consistent with prediabetes  >or=6.5%  Consistent with diabetes    High levels of fetal hemoglobin interfere with the HbA1C  assay. Heterozygous hemoglobin variants (HbS, HgC, etc)do  not significantly interfere with this assay.   However, presence of multiple variants may affect accuracy.     12/14/2017 4.5 4.0 - 5.6 % Final     Comment:     According to ADA guidelines, hemoglobin A1c <7.0% represents  optimal control in non-pregnant diabetic patients. Different  metrics may apply to specific patient populations.   Standards of Medical Care in Diabetes-2016.  For the purpose of screening for the presence of diabetes:  <5.7%     Consistent with the absence of diabetes  5.7-6.4%  Consistent with increasing risk for diabetes   (prediabetes)  >or=6.5%  Consistent with diabetes  Currently, no consensus exists for use of hemoglobin A1c  for diagnosis of diabetes for children.  This Hemoglobin A1c assay has significant interference with fetal   hemoglobin   (HbF). The results are invalid for patients with abnormal amounts of   HbF,   including those with known Hereditary Persistence   of Fetal Hemoglobin. Heterozygous hemoglobin variants (HbAS, HbAC,   HbAD, HbAE, HbA2) do not significantly interfere with this assay;   however, presence of multiple variants in a sample may impact the %   interference.         Review of Systems   Constitutional: Negative for chills and fever.   Cardiovascular:  Negative for claudication and leg swelling.   Skin:  Positive for color change and nail changes.   Musculoskeletal:  Positive for myalgias. Negative for joint pain, joint swelling, muscle cramps and muscle weakness.   Gastrointestinal:  Negative for nausea and vomiting.   Neurological:  Negative for numbness and paresthesias.   Psychiatric/Behavioral:  Negative for altered mental status.        "  Objective:     Physical Exam  Constitutional:       Appearance: Normal appearance. He is not ill-appearing.   Cardiovascular:      Pulses:           Dorsalis pedis pulses are 2+ on the right side and 2+ on the left side.        Posterior tibial pulses are 2+ on the right side and 2+ on the left side.      Comments: CFT is < 3 seconds bilateral.  Pedal hair growth is present bilateral.  No lower extremity edema noted bilateral.  Toes are warm to touch bilateral.    Musculoskeletal:         General: Tenderness present. No deformity or signs of injury.      Right lower leg: No edema.      Left lower leg: No edema.      Comments: Muscle strength 5/5 in all muscle groups bilateral.  No tenderness nor crepitation with ROM of foot/ankle joints bilateral.  (+) for pain with palpation to the bursa overlying the Lt. Retrocalcaneal exostosis.  Lt. Sided gastrocnemius equinus with significant improvement in dorsiflexion.  (+) for pain with palpation to the Lt. Achilles tendon at its insertion.  (-) dell or defect noted to said tendon.     Skin:     General: Skin is warm and dry.      Capillary Refill: Capillary refill takes 2 to 3 seconds.      Findings: No bruising, ecchymosis, erythema, signs of injury, laceration, lesion, petechiae, rash or wound.      Comments: Pedal skin has normal turgor, temperature, and texture bilateral.  Significant mycotic changes noted to the Lt. Hallux toenail.  Remaining toenails x 9 appear normotrophic.     Right leg and foot incisions all well healed   Neurological:      General: No focal deficit present.      Mental Status: He is alert.      Sensory: No sensory deficit.      Motor: No weakness or atrophy.      Comments: Light touch is intact bilateral.             Assessment:      Encounter Diagnosis   Name Primary?    Post-operative state Yes             Plan:     Eric Razo" was seen today for post-op evaluation.    Diagnoses and all orders for this visit:    Post-operative " state              I counseled the patient on his conditions, their implications and medical management.    Remain ambulating in the tall boot only    Sutures removed without incident    Return 4 week follow up before returning to normal shoe wear    Jared Noriega DPM

## 2025-06-13 RX ORDER — LOSARTAN POTASSIUM 50 MG/1
50 TABLET ORAL
Qty: 90 TABLET | Refills: 3 | Status: SHIPPED | OUTPATIENT
Start: 2025-06-13

## 2025-06-13 NOTE — TELEPHONE ENCOUNTER
Refill Routing Note   Medication(s) are not appropriate for processing by [facility] for the following reason(s):        Required vitals abnormal    [facility] action(s):  Defer      Medication Therapy Plan: FLOS      Appointments  past 12m or future 3m with PCP    Date Provider   Last Visit   5/23/2025 Maddi Gastelum DO   Next Visit   7/31/2025 Maddi Gastelum,    ED visits in past 90 days: 0        Note composed:9:47 AM 06/13/2025

## 2025-06-13 NOTE — TELEPHONE ENCOUNTER
Care Due:                  Date            Visit Type   Department     Provider  --------------------------------------------------------------------------------                                             [facility]  Last Visit: 05-      PRE-OP       MEDICINE       Maddi Gastelum                              EP -                              PRIMARY      [facility]  Next Visit: 07-      CARE ([facility])   MEDICINE       Maddi Gastelum                                                            Last  Test          Frequency    Reason                     Performed    Due Date  --------------------------------------------------------------------------------    Lipid Panel.  12 months..  rosuvastatin.............  07-   07-    [facility] Embedded Care Due Messages. Reference number: [id number].   6/13/2025 3:23:54 AM CDT

## 2025-06-18 DIAGNOSIS — F51.01 PRIMARY INSOMNIA: ICD-10-CM

## 2025-06-18 RX ORDER — TEMAZEPAM 15 MG/1
15 CAPSULE ORAL NIGHTLY
Qty: 90 CAPSULE | Refills: 1 | Status: CANCELLED | OUTPATIENT
Start: 2025-06-18

## 2025-06-18 NOTE — TELEPHONE ENCOUNTER
No care due was identified.  [facility] Embedded Care Due Messages. Reference number: [id number].   6/18/2025 10:59:26 AM CDT   [place]

## 2025-06-18 NOTE — TELEPHONE ENCOUNTER
No care due was identified.  [facility] Embedded Care Due Messages. Reference number: [id number].   6/18/2025 3:53:13 PM CDT

## 2025-06-19 RX ORDER — TEMAZEPAM 15 MG/1
15 CAPSULE ORAL NIGHTLY
Qty: 90 CAPSULE | Refills: 1 | Status: SHIPPED | OUTPATIENT
Start: 2025-06-19

## 2025-07-07 DIAGNOSIS — N40.0 BENIGN NON-NODULAR PROSTATIC HYPERPLASIA WITHOUT LOWER URINARY TRACT SYMPTOMS: ICD-10-CM

## 2025-07-07 RX ORDER — TAMSULOSIN HYDROCHLORIDE 0.4 MG/1
CAPSULE ORAL
Qty: 180 CAPSULE | Refills: 3 | Status: SHIPPED | OUTPATIENT
Start: 2025-07-07

## 2025-07-07 NOTE — TELEPHONE ENCOUNTER
No care due was identified.  [facility] Embedded Care Due Messages. Reference number: [id number].   7/07/2025 10:15:07 AM CDT

## 2025-07-07 NOTE — TELEPHONE ENCOUNTER
Refill Decision Note   Eric Buitrago  is requesting a refill authorization.  Brief Assessment and Rationale for Refill:  Approve     Medication Therapy Plan:         Comments:     Note composed:10:35 AM 07/07/2025

## 2025-07-08 DIAGNOSIS — N40.0 BENIGN NON-NODULAR PROSTATIC HYPERPLASIA WITHOUT LOWER URINARY TRACT SYMPTOMS: ICD-10-CM

## 2025-07-08 RX ORDER — TAMSULOSIN HYDROCHLORIDE 0.4 MG/1
CAPSULE ORAL
Qty: 180 CAPSULE | Refills: 3 | OUTPATIENT
Start: 2025-07-08

## 2025-07-08 NOTE — TELEPHONE ENCOUNTER
No care due was identified.  [facility] Embedded Care Due Messages. Reference number: [id number].   7/08/2025 3:23:25 AM CDT

## 2025-07-08 NOTE — TELEPHONE ENCOUNTER
Refill Decision Note   Eric Buitrago  is requesting a refill authorization.  Brief Assessment and Rationale for Refill:  Quick Discontinue     Medication Therapy Plan:  Receipt confirmed by pharmacy (7/7/2025 10:35 AM CDT)      Comments:     Note composed:2:42 PM 07/08/2025

## 2025-07-09 ENCOUNTER — OFFICE VISIT (OUTPATIENT)
Dept: PODIATRY | Facility: CLINIC | Age: 80
End: 2025-07-09
Payer: MEDICARE

## 2025-07-09 VITALS — WEIGHT: 175.25 LBS | BODY MASS INDEX: 24.53 KG/M2 | HEIGHT: 71 IN

## 2025-07-09 DIAGNOSIS — Z98.890 POST-OPERATIVE STATE: Primary | ICD-10-CM

## 2025-07-09 PROCEDURE — 99999 PR PBB SHADOW E&M-EST. PATIENT-LVL III: CPT | Mod: PBBFAC,,, | Performed by: PODIATRIST

## 2025-07-09 NOTE — PROGRESS NOTES
Subjective:     Patient ID: Eric Buitrago is a 80 y.o. male.    Chief Complaint: Post-op Evaluation  Patient presents to clinic for a follow up regarding Lt. Posterior heel pain.  Notes continued pain with all weight bearing.  Symptoms improve slightly throughout the day, however, is really beginning to impede activities of daily living.  He continues wearing supportive shoes and is stretching as instructed.   He has failed past attempts at PT.  Inquires as to the next step in treatment.  Denies any additional pedal complaints.     4/15/25: patient returns for follow up left posterior heel pain that has been going on for about 2 years, he has seen Dr. Mcmullen who was able to do some injections and he was in a boot for a time which helped but wore off. He is here as a consult for possible surgical options.     5/6/25: patient return for follow up left posterior heel pain over the last couple years despite offloading in a boot, PT and antiinflammatories the area continues to hurt. Here to set up surgical intervention    6/4/25: Patient returns for left s/p 1 week retrocalcaneal exostectomy and gastroc recession, no new complaints. Doing well overall    6/11/25: Patient returns s/p 2 weeks retrocalcaneal exostectomy and gastroc recession walking in the boot no new complaints    7/9/25: Patient returns s/p 6 weeks retrocalcaneal exostectomy and gastroc recession walking in the boot and doing well    Past Medical History:   Diagnosis Date    Allergy     Hyperlipidemia     Hypertension     Hypothyroid     IBS (irritable bowel syndrome)        Past Surgical History:   Procedure Laterality Date    CHOLECYSTECTOMY  2007    COLONOSCOPY  ~2007    RABITO.    COLONOSCOPY N/A 2/21/2018    Procedure: COLONOSCOPY;  Surgeon: Josue Myles Jr., MD;  Location: [facility];  Service: Endoscopy;  Laterality: N/A;    ENDOSCOPIC GASTROCNEMIUS RECESSION Left 5/29/2025    Procedure: RECESSION, GASTROCNEMIUS, ENDOSCOPIC;  Surgeon:  Jared Noriega DPM;  Location: [facility] OR;  Service: Podiatry;  Laterality: Left;  popliteal saphenous block, power, mini c-arm, video and cisco gastroc kit    SURGICAL REMOVAL OF RETROCALCANEAL EXOSTOSIS Left 2025    Procedure: EXCISION, EXOSTOSIS, RETROCALCANEAL;  Surgeon: Jared Noriega DPM;  Location: [facility] OR;  Service: Podiatry;  Laterality: Left;  popliteal saphenous block, power, mini c-arm       Family History   Problem Relation Name Age of Onset    Lymphoma Mother      Stroke Father      Breast cancer Sister      Leukemia Brother      Glaucoma Neg Hx      Macular degeneration Neg Hx      Retinal detachment Neg Hx         Social History     Socioeconomic History    Marital status:    Occupational History    Occupation: retired school psycholgist   Tobacco Use    Smoking status: Former     Current packs/day: 0.00     Average packs/day: 0.3 packs/day for 10.0 years (2.5 ttl pk-yrs)     Types: Cigarettes     Start date: 1973     Quit date: 1983     Years since quittin.0    Smokeless tobacco: Never   Substance and Sexual Activity    Alcohol use: Yes     Comment: occ    Drug use: No    Sexual activity: Not Currently     Social Drivers of Health     Financial Resource Strain: Low Risk  (2025)    Overall Financial Resource Strain (CARDIA)     Difficulty of Paying Living Expenses: Not hard at all   Food Insecurity: No Food Insecurity (2025)    Hunger Vital Sign     Worried About Running Out of Food in the Last Year: Never true     Ran Out of Food in the Last Year: Never true   Transportation Needs: No Transportation Needs (2025)    PRAPARE - Transportation     Lack of Transportation (Medical): No     Lack of Transportation (Non-Medical): No   Physical Activity: Insufficiently Active (2025)    Exercise Vital Sign     Days of Exercise per Week: 3 days     Minutes of Exercise per Session: 30 min   Stress: No Stress Concern Present (2025)    [employer] [place] [employer] - Occupational Stress Questionnaire     Feeling of Stress : Only a little   Housing Stability: Low Risk  (5/22/2025)    Housing Stability Vital Sign     Unable to Pay for Housing in the Last Year: No     Number of Times Moved in the Last Year: 0     Homeless in the Last Year: No       Current Outpatient Medications   Medication Sig Dispense Refill    amLODIPine (NORVASC) 2.5 MG tablet Take 1 pill at night 90 tablet 3    azelastine (ASTELIN) 137 mcg (0.1 %) nasal spray SPRAY 1 SPRAY IN EACH NOSTRIL TWICE DAILY 90 mL 3    doxepin (SINEQUAN) 50 MG capsule TAKE 2 CAPSULES BY MOUTH 1 TIME IN THE EVENING 180 capsule 2    fexofenadine (ALLEGRA) 60 MG tablet Take 1 tablet by mouth daily as needed.      gabapentin (NEURONTIN) 300 MG capsule TAKE 1 CAPSULE(300 MG) BY MOUTH EVERY EVENING 90 capsule 2    levothyroxine (SYNTHROID) 137 MCG Tab tablet TAKE 1 TABLET(137 MCG) BY MOUTH BEFORE BREAKFAST 90 tablet 3    losartan (COZAAR) 50 MG tablet TAKE 1 TABLET(50 MG) BY MOUTH EVERY DAY 90 tablet 3    meloxicam (MOBIC) 15 MG tablet TAKE 1 TABLET(15 MG) BY MOUTH EVERY DAY 90 tablet 3    oxyCODONE-acetaminophen (PERCOCET) 5-325 mg per tablet Take 1 tablet by mouth every 4 (four) hours as needed for Pain. 15 tablet 0    rosuvastatin (CRESTOR) 5 MG tablet TAKE 1 TABLET(5 MG) BY MOUTH EVERY DAY 90 tablet 3    tamsulosin (FLOMAX) 0.4 mg Cap TAKE 2 CAPSULES(0.8 MG) BY MOUTH EVERY EVENING 180 capsule 3    temazepam (RESTORIL) 15 mg Cap Take 1 capsule (15 mg total) by mouth every night at bedtime. 90 capsule 1     No current facility-administered medications for this visit.       Review of patient's allergies indicates:   Allergen Reactions    No known drug allergies         Hemoglobin A1C   Date Value Ref Range Status   01/14/2025 4.7 4.0 - 5.6 % Final     Comment:     ADA Screening Guidelines:  5.7-6.4%  Consistent with prediabetes  >or=6.5%  Consistent with diabetes    High levels of fetal hemoglobin interfere with the  HbA1C  assay. Heterozygous hemoglobin variants (HbS, HgC, etc)do  not significantly interfere with this assay.   However, presence of multiple variants may affect accuracy.     08/15/2024 4.8 4.0 - 5.6 % Final     Comment:     ADA Screening Guidelines:  5.7-6.4%  Consistent with prediabetes  >or=6.5%  Consistent with diabetes    High levels of fetal hemoglobin interfere with the HbA1C  assay. Heterozygous hemoglobin variants (HbS, HgC, etc)do  not significantly interfere with this assay.   However, presence of multiple variants may affect accuracy.     12/14/2017 4.5 4.0 - 5.6 % Final     Comment:     According to ADA guidelines, hemoglobin A1c <7.0% represents  optimal control in non-pregnant diabetic patients. Different  metrics may apply to specific patient populations.   Standards of Medical Care in Diabetes-2016.  For the purpose of screening for the presence of diabetes:  <5.7%     Consistent with the absence of diabetes  5.7-6.4%  Consistent with increasing risk for diabetes   (prediabetes)  >or=6.5%  Consistent with diabetes  Currently, no consensus exists for use of hemoglobin A1c  for diagnosis of diabetes for children.  This Hemoglobin A1c assay has significant interference with fetal   hemoglobin   (HbF). The results are invalid for patients with abnormal amounts of   HbF,   including those with known Hereditary Persistence   of Fetal Hemoglobin. Heterozygous hemoglobin variants (HbAS, HbAC,   HbAD, HbAE, HbA2) do not significantly interfere with this assay;   however, presence of multiple variants in a sample may impact the %   interference.         Review of Systems   Constitutional: Negative for chills and fever.   Cardiovascular:  Negative for claudication and leg swelling.   Skin:  Positive for color change and nail changes.   Musculoskeletal:  Positive for myalgias. Negative for joint pain, joint swelling, muscle cramps and muscle weakness.   Gastrointestinal:  Negative for nausea and vomiting.    Neurological:  Negative for numbness and paresthesias.   Psychiatric/Behavioral:  Negative for altered mental status.         Objective:     Physical Exam  Constitutional:       Appearance: Normal appearance. He is not ill-appearing.   Cardiovascular:      Pulses:           Dorsalis pedis pulses are 2+ on the right side and 2+ on the left side.        Posterior tibial pulses are 2+ on the right side and 2+ on the left side.      Comments: CFT is < 3 seconds bilateral.  Pedal hair growth is present bilateral.  No lower extremity edema noted bilateral.  Toes are warm to touch bilateral.    Musculoskeletal:         General: Tenderness present. No deformity or signs of injury.      Right lower leg: No edema.      Left lower leg: No edema.      Comments: Muscle strength 5/5 in all muscle groups bilateral.  No tenderness nor crepitation with ROM of foot/ankle joints bilateral.  (+) for pain with palpation to the bursa overlying the Lt. Retrocalcaneal exostosis.  Lt. Sided gastrocnemius equinus with significant improvement in dorsiflexion.  (+) for pain with palpation to the Lt. Achilles tendon at its insertion.  (-) dell or defect noted to said tendon.     Skin:     General: Skin is warm and dry.      Capillary Refill: Capillary refill takes 2 to 3 seconds.      Findings: No bruising, ecchymosis, erythema, signs of injury, laceration, lesion, petechiae, rash or wound.      Comments: Pedal skin has normal turgor, temperature, and texture bilateral.  Significant mycotic changes noted to the Lt. Hallux toenail.  Remaining toenails x 9 appear normotrophic.     Right leg and foot incisions all well healed   Neurological:      General: No focal deficit present.      Mental Status: He is alert.      Sensory: No sensory deficit.      Motor: No weakness or atrophy.      Comments: Light touch is intact bilateral.             Assessment:      Encounter Diagnosis   Name Primary?    Post-operative state Yes               Plan:  "Eric Razo" was seen today for post-op evaluation.    Diagnoses and all orders for this visit:    Post-operative state                I counseled the patient on his conditions, their implications and medical management.    Can try to return to normal shoes and see how he does, increase activity slowly to toleration    Return 4 weeks for final post op    Jared Noriega DPM                     "

## 2025-07-24 ENCOUNTER — LAB VISIT (OUTPATIENT)
Dept: LAB | Facility: HOSPITAL | Age: 80
End: 2025-07-24
Attending: INTERNAL MEDICINE
Payer: MEDICARE

## 2025-07-24 DIAGNOSIS — I10 ESSENTIAL HYPERTENSION: ICD-10-CM

## 2025-07-24 LAB
ABSOLUTE EOSINOPHIL (OHS): 0.31 K/UL
ABSOLUTE MONOCYTE (OHS): 0.51 K/UL (ref 0.3–1)
ABSOLUTE NEUTROPHIL COUNT (OHS): 3.12 K/UL (ref 1.8–7.7)
ALBUMIN SERPL BCP-MCNC: 4.1 G/DL (ref 3.5–5.2)
ALP SERPL-CCNC: 55 UNIT/L (ref 40–150)
ALT SERPL W/O P-5'-P-CCNC: 27 UNIT/L (ref 0–55)
ANION GAP (OHS): 9 MMOL/L (ref 8–16)
AST SERPL-CCNC: 29 UNIT/L (ref 0–50)
BASOPHILS # BLD AUTO: 0.02 K/UL
BASOPHILS NFR BLD AUTO: 0.3 %
BILIRUB SERPL-MCNC: 0.8 MG/DL (ref 0.1–1)
BUN SERPL-MCNC: 20 MG/DL (ref 8–23)
CALCIUM SERPL-MCNC: 8.8 MG/DL (ref 8.7–10.5)
CHLORIDE SERPL-SCNC: 110 MMOL/L (ref 95–110)
CHOLEST SERPL-MCNC: 115 MG/DL (ref 120–199)
CHOLEST/HDLC SERPL: 2.9 {RATIO} (ref 2–5)
CO2 SERPL-SCNC: 24 MMOL/L (ref 23–29)
CREAT SERPL-MCNC: 0.9 MG/DL (ref 0.5–1.4)
ERYTHROCYTE [DISTWIDTH] IN BLOOD BY AUTOMATED COUNT: 13.8 % (ref 11.5–14.5)
GFR SERPLBLD CREATININE-BSD FMLA CKD-EPI: >60 ML/MIN/1.73/M2
GLUCOSE SERPL-MCNC: 109 MG/DL (ref 70–110)
HCT VFR BLD AUTO: 42.1 % (ref 40–54)
HDLC SERPL-MCNC: 40 MG/DL (ref 40–75)
HDLC SERPL: 34.8 % (ref 20–50)
HGB BLD-MCNC: 14.2 GM/DL (ref 14–18)
IMM GRANULOCYTES # BLD AUTO: 0.02 K/UL (ref 0–0.04)
IMM GRANULOCYTES NFR BLD AUTO: 0.3 % (ref 0–0.5)
LDLC SERPL CALC-MCNC: 60.4 MG/DL (ref 63–159)
LYMPHOCYTES # BLD AUTO: 2.24 K/UL (ref 1–4.8)
MCH RBC QN AUTO: 31 PG (ref 27–31)
MCHC RBC AUTO-ENTMCNC: 33.7 G/DL (ref 32–36)
MCV RBC AUTO: 92 FL (ref 82–98)
NONHDLC SERPL-MCNC: 75 MG/DL
NUCLEATED RBC (/100WBC) (OHS): 0 /100 WBC
PLATELET # BLD AUTO: 221 K/UL (ref 150–450)
PMV BLD AUTO: 11.2 FL (ref 9.2–12.9)
POTASSIUM SERPL-SCNC: 4.5 MMOL/L (ref 3.5–5.1)
PROT SERPL-MCNC: 6.7 GM/DL (ref 6–8.4)
RBC # BLD AUTO: 4.58 M/UL (ref 4.6–6.2)
RELATIVE EOSINOPHIL (OHS): 5 %
RELATIVE LYMPHOCYTE (OHS): 36 % (ref 18–48)
RELATIVE MONOCYTE (OHS): 8.2 % (ref 4–15)
RELATIVE NEUTROPHIL (OHS): 50.2 % (ref 38–73)
SODIUM SERPL-SCNC: 143 MMOL/L (ref 136–145)
TRIGL SERPL-MCNC: 73 MG/DL (ref 30–150)
TSH SERPL-ACNC: 2.39 UIU/ML (ref 0.4–4)
WBC # BLD AUTO: 6.22 K/UL (ref 3.9–12.7)

## 2025-07-24 PROCEDURE — 80053 COMPREHEN METABOLIC PANEL: CPT

## 2025-07-24 PROCEDURE — 84443 ASSAY THYROID STIM HORMONE: CPT

## 2025-07-24 PROCEDURE — 36415 COLL VENOUS BLD VENIPUNCTURE: CPT | Mod: PN

## 2025-07-24 PROCEDURE — 85025 COMPLETE CBC W/AUTO DIFF WBC: CPT

## 2025-07-24 PROCEDURE — 80061 LIPID PANEL: CPT

## 2025-07-29 ENCOUNTER — PATIENT MESSAGE (OUTPATIENT)
Dept: FAMILY MEDICINE | Facility: CLINIC | Age: 80
End: 2025-07-29
Payer: MEDICARE

## 2025-07-31 ENCOUNTER — OFFICE VISIT (OUTPATIENT)
Dept: FAMILY MEDICINE | Facility: CLINIC | Age: 80
End: 2025-07-31
Payer: MEDICARE

## 2025-07-31 VITALS
SYSTOLIC BLOOD PRESSURE: 136 MMHG | OXYGEN SATURATION: 98 % | WEIGHT: 176.94 LBS | BODY MASS INDEX: 23.97 KG/M2 | RESPIRATION RATE: 16 BRPM | TEMPERATURE: 98 F | DIASTOLIC BLOOD PRESSURE: 70 MMHG | HEIGHT: 72 IN | HEART RATE: 100 BPM

## 2025-07-31 DIAGNOSIS — M92.62 HAGLUND DEFORMITY OF LEFT HEEL: ICD-10-CM

## 2025-07-31 DIAGNOSIS — M47.816 SPONDYLOSIS OF LUMBAR REGION WITHOUT MYELOPATHY OR RADICULOPATHY: ICD-10-CM

## 2025-07-31 DIAGNOSIS — G89.29 CHRONIC PAIN OF RIGHT KNEE: ICD-10-CM

## 2025-07-31 DIAGNOSIS — Z12.5 SCREENING FOR PROSTATE CANCER: ICD-10-CM

## 2025-07-31 DIAGNOSIS — J30.9 CHRONIC ALLERGIC RHINITIS: ICD-10-CM

## 2025-07-31 DIAGNOSIS — M25.561 CHRONIC PAIN OF RIGHT KNEE: ICD-10-CM

## 2025-07-31 DIAGNOSIS — F51.01 PRIMARY INSOMNIA: ICD-10-CM

## 2025-07-31 DIAGNOSIS — E03.9 HYPOTHYROIDISM, UNSPECIFIED TYPE: ICD-10-CM

## 2025-07-31 DIAGNOSIS — M77.52 POSTCALCANEAL BURSITIS OF LEFT FOOT: ICD-10-CM

## 2025-07-31 DIAGNOSIS — N40.0 BENIGN NON-NODULAR PROSTATIC HYPERPLASIA WITHOUT LOWER URINARY TRACT SYMPTOMS: ICD-10-CM

## 2025-07-31 DIAGNOSIS — M19.042 PRIMARY OSTEOARTHRITIS OF BOTH HANDS: ICD-10-CM

## 2025-07-31 DIAGNOSIS — I10 ESSENTIAL HYPERTENSION: Primary | ICD-10-CM

## 2025-07-31 DIAGNOSIS — M19.041 PRIMARY OSTEOARTHRITIS OF BOTH HANDS: ICD-10-CM

## 2025-07-31 DIAGNOSIS — E78.5 HYPERLIPIDEMIA, UNSPECIFIED HYPERLIPIDEMIA TYPE: ICD-10-CM

## 2025-07-31 DIAGNOSIS — Z12.11 SCREEN FOR COLON CANCER: ICD-10-CM

## 2025-07-31 DIAGNOSIS — M47.22 OSTEOARTHRITIS OF SPINE WITH RADICULOPATHY, CERVICAL REGION: ICD-10-CM

## 2025-07-31 DIAGNOSIS — K58.9 IRRITABLE BOWEL SYNDROME, UNSPECIFIED TYPE: ICD-10-CM

## 2025-07-31 DIAGNOSIS — Z79.899 MEDICATION MANAGEMENT: ICD-10-CM

## 2025-07-31 PROCEDURE — 1101F PT FALLS ASSESS-DOCD LE1/YR: CPT | Mod: CPTII,S$GLB,, | Performed by: INTERNAL MEDICINE

## 2025-07-31 PROCEDURE — 99214 OFFICE O/P EST MOD 30 MIN: CPT | Mod: S$GLB,,, | Performed by: INTERNAL MEDICINE

## 2025-07-31 PROCEDURE — 3075F SYST BP GE 130 - 139MM HG: CPT | Mod: CPTII,S$GLB,, | Performed by: INTERNAL MEDICINE

## 2025-07-31 PROCEDURE — 3078F DIAST BP <80 MM HG: CPT | Mod: CPTII,S$GLB,, | Performed by: INTERNAL MEDICINE

## 2025-07-31 PROCEDURE — G2211 COMPLEX E/M VISIT ADD ON: HCPCS | Mod: S$GLB,,, | Performed by: INTERNAL MEDICINE

## 2025-07-31 PROCEDURE — 1159F MED LIST DOCD IN RCRD: CPT | Mod: CPTII,S$GLB,, | Performed by: INTERNAL MEDICINE

## 2025-07-31 PROCEDURE — 1126F AMNT PAIN NOTED NONE PRSNT: CPT | Mod: CPTII,S$GLB,, | Performed by: INTERNAL MEDICINE

## 2025-07-31 PROCEDURE — 3288F FALL RISK ASSESSMENT DOCD: CPT | Mod: CPTII,S$GLB,, | Performed by: INTERNAL MEDICINE

## 2025-07-31 PROCEDURE — 1160F RVW MEDS BY RX/DR IN RCRD: CPT | Mod: CPTII,S$GLB,, | Performed by: INTERNAL MEDICINE

## 2025-07-31 NOTE — PROGRESS NOTES
Subjective:       Patient ID: Eric Buitrago is a 80 y.o. male.    Medication List with Changes/Refills   Current Medications    AMLODIPINE (NORVASC) 2.5 MG TABLET    Take 1 pill at night    AZELASTINE (ASTELIN) 137 MCG (0.1 %) NASAL SPRAY    SPRAY 1 SPRAY IN EACH NOSTRIL TWICE DAILY    DOXEPIN (SINEQUAN) 50 MG CAPSULE    TAKE 2 CAPSULES BY MOUTH 1 TIME IN THE EVENING    FEXOFENADINE (ALLEGRA) 60 MG TABLET    Take 1 tablet by mouth daily as needed.    GABAPENTIN (NEURONTIN) 300 MG CAPSULE    TAKE 1 CAPSULE(300 MG) BY MOUTH EVERY EVENING    LEVOTHYROXINE (SYNTHROID) 137 MCG TAB TABLET    TAKE 1 TABLET(137 MCG) BY MOUTH BEFORE BREAKFAST    LOSARTAN (COZAAR) 50 MG TABLET    TAKE 1 TABLET(50 MG) BY MOUTH EVERY DAY    MELOXICAM (MOBIC) 15 MG TABLET    TAKE 1 TABLET(15 MG) BY MOUTH EVERY DAY    OXYCODONE-ACETAMINOPHEN (PERCOCET) 5-325 MG PER TABLET    Take 1 tablet by mouth every 4 (four) hours as needed for Pain.    ROSUVASTATIN (CRESTOR) 5 MG TABLET    TAKE 1 TABLET(5 MG) BY MOUTH EVERY DAY    TAMSULOSIN (FLOMAX) 0.4 MG CAP    TAKE 2 CAPSULES(0.8 MG) BY MOUTH EVERY EVENING    TEMAZEPAM (RESTORIL) 15 MG CAP    Take 1 capsule (15 mg total) by mouth every night at bedtime.       Chief Complaint: Follow-up (6 month follow up )  He is here today to f/u on chronic medical issues.         He has hypertension and is taking losartan 50 mg qday and amlodipine 5 mg in the am and 2.5 mg in the pm. He denies any CAD. He denies chest pain or shortness of breath. Home BP run 130s/70s.     He has hyperlipidemia and is taking crestor 5 mg qday. His lipids on 7/2025 were 115/73/40/60.        He has hypothyroid that is controlled on levothyroxine 137 mcg qday. His last TSH was normal on 7/2025.      He has BPH which is controlled with flomax 0.8 mg nightly. He says this helps with his frequency and nocturia. PSA on 7/2024 was 2.0.       He has seasonal allergies controlled with allegra as needed and astelin which he takes daily.  He  denies any active symptoms.         He has IBS for many years. He was seen by GI about 10 years ago and started on doxepin for spastic colon.  He says this has worked and he no longer has any symptoms.  His most recent colonoscopy on 2/2018 had no polyps but did show mild colonic spasms. He continues on doxepin to 100 mg daily and feels his symptoms are controlled on this dose.      He has insomnia that is controlled with PRN temazepam 15 mg 1/2 pill to 1 pill nightly.  He says it works and denies side effects.          He occasionally has low back pain if he over exerts himself. He will take mobic with good relief.  He is pain free today. Pain worse with bending or lifting.  Better with stretching and rest.       He has shoulder pain with intermittent radiation down left arm. He was seen by pain management and had an MRI on 1/2019 showing Multilevel spondylosis, greatest at C5-6 where there is mild cord flattening with effacement of ventral and dorsal CSF.  No cord signal abnormality.Multilevel foraminal stenosis, greatest at C5-6 where it is moderate-severe bilaterally. Moderate multilevel hypertrophic facet arthropathy.  He was seen by pain and started on gabaepntin 300 mg qhs which has helped with his pain.  If worsens then pain would like to do a cervical steroid injection.  He completed PT without much difference in his pain.  He reports gabapentin 300 mg nightly completely controls his pain.       He has osteoarthritis of both hands left > right. 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 He says years ago he had a steroid injection in his thumb that helped a similar problem. Rheumatologic workup was negative on 6/2021.   he has trigger finger of his right third finger.      He was diagnosed with left achilles tendonitis that started in 2024 after wearing boots on a hiking trip.  He continues with pain despite rest and having his foot in a  boot. He underwent excision exostosis retrocalcaneal and recession of gastrocnemius on 5/2025. This has resolved his pain. He is still walking with a limp.     He is now having right knee pain due to compensating for his left heel pain while in the boot.  He would like to discuss with orthopedics.     He lives with his wife and feels safe at home. He is very active and enjoys working in his yard. In the past he went to the gym 2-3 times a week for one hour and does bike and weight training but since foot injury has not been able to go back. He does eat healthy. No falls or balance issues.  He is working on getting hearing aids.      Colonoscopy---2/2018 repeat in 8 years  Tdap---9/2017  Influenza vaccine---11/2024  Pneumovax 23----6/2018  Prevnar 13----6/2017  Shingles vaccine----- 11/2018, 1/2019   Covid vaccine---5 doses  RSV vaccine---9/2024     Review of Systems   Constitutional:  Negative for appetite change, fatigue, fever and unexpected weight change.   HENT:  Negative for congestion, ear pain, hearing loss, sore throat and trouble swallowing.    Eyes:  Negative for pain and visual disturbance.   Respiratory:  Negative for cough, chest tightness, shortness of breath and wheezing.    Cardiovascular:  Negative for chest pain, palpitations and leg swelling.   Gastrointestinal:  Negative for abdominal pain, blood in stool, constipation, diarrhea, nausea and vomiting.   Endocrine: Negative for polyuria.   Genitourinary:  Negative for dysuria and hematuria.   Musculoskeletal:  Positive for arthralgias. Negative for back pain and myalgias.   Skin:  Negative for rash.   Allergic/Immunologic: Positive for environmental allergies.   Neurological:  Negative for dizziness, weakness, numbness and headaches.   Hematological:  Does not bruise/bleed easily.   Psychiatric/Behavioral:  Negative for dysphoric mood, sleep disturbance and suicidal ideas. The patient is not nervous/anxious.        Objective:      Vitals:     "07/31/25 1018   BP: 136/70   BP Location: Right arm   Patient Position: Sitting   Pulse: 100   Resp: 16   Temp: 98.2 °F (36.8 °C)   TempSrc: Temporal   SpO2: 98%   Weight: 80.3 kg (176 lb 14.7 oz)   Height: 5' 11.5" (1.816 m)     Body mass index is 24.33 kg/m².  Physical Exam    General appearance: No acute distress, cooperative  Eyes: PERRL, EOMI, conjunctiva clear  Ears: normal external ear and pinna, tm clear without drainage, canals clear  Nose: Normal mucosa without drainage  Throat: no exudates or erythema, tonsils not enlarged  Mouth: no sores or lesions, moist mucous membranes  Neck: FROM, soft, supple, no thyromegaly, no bruits  Lymph: no anterior or posterior cervical adenopathy  Heart::  Regular rate and rhythm, no murmur  Lung: Clear to ascultation bilaterally, no wheezing, no rales, no rhonchi, no distress  Abdomen: Soft, nontender, no distention, no hepatosplenomegaly, bowel sounds normal, no guarding, no rebound, no peritoneal signs  Skin: no rashes, no lesions  Extremities: 1+ pitting edema in left lower leg, no edema on the right  Neuro: CN 2-12 intact, 5/5 muscle strength upper and lower extremity bilaterally, 2+ DTRs UE and LE bilaterally, limping gait mild   Peripheral pulses: 1+ pedal pulses bilaterally  Musculoskeletal: FROM, good strenth, no tenderness  Joint: normal appearance, no swelling, no warmth, deformity in bilateral hands with trigger finger on right third finger, crepitus in right knee     Assessment:       1. Essential hypertension    2. Hyperlipidemia, unspecified hyperlipidemia type    3. Chronic allergic rhinitis    4. Hypothyroidism, unspecified type    5. Irritable bowel syndrome, unspecified type    6. Benign non-nodular prostatic hyperplasia without lower urinary tract symptoms    7. Primary insomnia    8. Osteoarthritis of spine with radiculopathy, cervical region    9. Spondylosis of lumbar region without myelopathy or radiculopathy    10. Primary osteoarthritis of both " hands    11. Chronic pain of right knee    12. [place] deformity of left heel    13. Postcalcaneal bursitis of left foot    14. Screening for prostate cancer    15. Medication management    16. Screen for colon cancer        Plan:       Essential hypertension  Well controlled and continue current regimen.   -     CBC Auto Differential; Future; Expected date: 07/31/2025  -     Comprehensive Metabolic Panel; Future; Expected date: 07/31/2025    Hyperlipidemia, unspecified hyperlipidemia type  Good control on crestor 5 mg daily    Chronic allergic rhinitis  Well controlled and continue current regimen.     Hypothyroidism, unspecified type  Good control on this dose of levothyroxine    Irritable bowel syndrome, unspecified type  Stable on doxepin for many years    Benign non-nodular prostatic hyperplasia without lower urinary tract symptoms  Mild increase in symptoms. Continue on flomax at 0.8 mg daily    Primary insomnia  Good control on temazepam.     Osteoarthritis of spine with radiculopathy, cervical region  Continue on gabapentin and mobic    Spondylosis of lumbar region without myelopathy or radiculopathy  Mild and no complaints today    Primary osteoarthritis of both hands  Uncontrolled with worsening trigger finger. He will f/u with hand surgeon in the future (established).     Chronic pain of right knee  New symptoms over the last 6 months due to left heel surgery and compensatory overuse.  Continue  mobic. Referral to orthopedics for possible steroid injection.   -     Ambulatory referral/consult to Orthopedics; Future; Expected date: 08/07/2025    [place] deformity of left heel/Postcalcaneal bursitis of left foot  S/p repair and doing much better    Screening for prostate cancer  -     PSA, Screening; Future; Expected date: 07/31/2025    Medication management  -     Hemoglobin A1C; Future; Expected date: 07/31/2025    Screen for colon cancer  -     Fecal Immunochemical Test (iFOBT); Future; Expected date:  07/31/2025    Follow up in about 6 months (around 1/31/2026) for chronic medical issues.

## 2025-08-06 ENCOUNTER — OFFICE VISIT (OUTPATIENT)
Dept: PODIATRY | Facility: CLINIC | Age: 80
End: 2025-08-06
Payer: MEDICARE

## 2025-08-06 VITALS — BODY MASS INDEX: 23.98 KG/M2 | WEIGHT: 177 LBS | HEIGHT: 72 IN

## 2025-08-06 DIAGNOSIS — Z98.890 POST-OPERATIVE STATE: Primary | ICD-10-CM

## 2025-08-06 PROCEDURE — 99999 PR PBB SHADOW E&M-EST. PATIENT-LVL III: CPT | Mod: PBBFAC,,, | Performed by: PODIATRIST

## 2025-08-06 NOTE — PROGRESS NOTES
Subjective:     Patient ID: Eric Buitrago is a 80 y.o. male.    Chief Complaint: Post-op Evaluation  Patient presents to clinic for a follow up regarding Lt. Posterior heel pain.  Notes continued pain with all weight bearing.  Symptoms improve slightly throughout the day, however, is really beginning to impede activities of daily living.  He continues wearing supportive shoes and is stretching as instructed.   He has failed past attempts at PT.  Inquires as to the next step in treatment.  Denies any additional pedal complaints.     4/15/25: patient returns for follow up left posterior heel pain that has been going on for about 2 years, he has seen Dr. Mcmullen who was able to do some injections and he was in a boot for a time which helped but wore off. He is here as a consult for possible surgical options.     5/6/25: patient return for follow up left posterior heel pain over the last couple years despite offloading in a boot, PT and antiinflammatories the area continues to hurt. Here to set up surgical intervention    6/4/25: Patient returns for left s/p 1 week retrocalcaneal exostectomy and gastroc recession, no new complaints. Doing well overall    6/11/25: Patient returns s/p 2 weeks retrocalcaneal exostectomy and gastroc recession walking in the boot no new complaints    7/9/25: Patient returns s/p 6 weeks retrocalcaneal exostectomy and gastroc recession walking in the boot and doing well    8/6/25: Patient returns for follow up retrocalcaneal exostectomy and gastroc recession walking in normal shoes no pain noted and doing well, feels his gait is just a little off still    Past Medical History:   Diagnosis Date    Allergy     Hyperlipidemia     Hypertension     Hypothyroid     IBS (irritable bowel syndrome)        Past Surgical History:   Procedure Laterality Date    CHOLECYSTECTOMY  2007    COLONOSCOPY  ~2007    RABITO.    COLONOSCOPY N/A 2/21/2018    Procedure: COLONOSCOPY;  Surgeon: Josue Myles Jr.,  MD;  Location: [facility] ENDO;  Service: Endoscopy;  Laterality: N/A;    ENDOSCOPIC GASTROCNEMIUS RECESSION Left 2025    Procedure: RECESSION, GASTROCNEMIUS, ENDOSCOPIC;  Surgeon: Jared Noriega DPM;  Location: [facility] OR;  Service: Podiatry;  Laterality: Left;  popliteal saphenous block, power, mini c-arm, video and [employer] gastroc kit    SURGICAL REMOVAL OF RETROCALCANEAL EXOSTOSIS Left 2025    Procedure: EXCISION, EXOSTOSIS, RETROCALCANEAL;  Surgeon: Jared Noriega DPM;  Location: [facility] OR;  Service: Podiatry;  Laterality: Left;  popliteal saphenous block, power, mini c-arm       Family History   Problem Relation Name Age of Onset    Lymphoma Mother      Stroke Father      Breast cancer Sister      Leukemia Brother      Glaucoma Neg Hx      Macular degeneration Neg Hx      Retinal detachment Neg Hx         Social History     Socioeconomic History    Marital status:    Occupational History    Occupation: retired school psycholgist   Tobacco Use    Smoking status: Former     Current packs/day: 0.00     Average packs/day: 0.3 packs/day for 10.0 years (2.5 ttl pk-yrs)     Types: Cigarettes     Start date: 1973     Quit date: 1983     Years since quittin.1    Smokeless tobacco: Never   Substance and Sexual Activity    Alcohol use: Yes     Comment: occ    Drug use: No    Sexual activity: Not Currently     Social Drivers of Health     Financial Resource Strain: Low Risk  (2025)    Overall Financial Resource Strain (CARDIA)     Difficulty of Paying Living Expenses: Not hard at all   Food Insecurity: No Food Insecurity (2025)    Hunger Vital Sign     Worried About Running Out of Food in the Last Year: Never true     Ran Out of Food in the Last Year: Never true   Transportation Needs: No Transportation Needs (2025)    PRAPARE - Transportation     Lack of Transportation (Medical): No     Lack of Transportation (Non-Medical): No   Physical Activity: Insufficiently Active  (5/22/2025)    Exercise Vital Sign     Days of Exercise per Week: 3 days     Minutes of Exercise per Session: 30 min   Stress: No Stress Concern Present (5/22/2025)    [employer] - Occupational Stress Questionnaire     Feeling of Stress : Only a little   Housing Stability: Low Risk  (5/22/2025)    Housing Stability Vital Sign     Unable to Pay for Housing in the Last Year: No     Number of Times Moved in the Last Year: 0     Homeless in the Last Year: No       Current Outpatient Medications   Medication Sig Dispense Refill    amLODIPine (NORVASC) 2.5 MG tablet Take 1 pill at night 90 tablet 3    azelastine (ASTELIN) 137 mcg (0.1 %) nasal spray SPRAY 1 SPRAY IN EACH NOSTRIL TWICE DAILY 90 mL 3    doxepin (SINEQUAN) 50 MG capsule TAKE 2 CAPSULES BY MOUTH 1 TIME IN THE EVENING 180 capsule 2    fexofenadine (ALLEGRA) 60 MG tablet Take 1 tablet by mouth daily as needed.      gabapentin (NEURONTIN) 300 MG capsule TAKE 1 CAPSULE(300 MG) BY MOUTH EVERY EVENING 90 capsule 2    levothyroxine (SYNTHROID) 137 MCG Tab tablet TAKE 1 TABLET(137 MCG) BY MOUTH BEFORE BREAKFAST 90 tablet 3    losartan (COZAAR) 50 MG tablet TAKE 1 TABLET(50 MG) BY MOUTH EVERY DAY 90 tablet 3    meloxicam (MOBIC) 15 MG tablet TAKE 1 TABLET(15 MG) BY MOUTH EVERY DAY 90 tablet 3    oxyCODONE-acetaminophen (PERCOCET) 5-325 mg per tablet Take 1 tablet by mouth every 4 (four) hours as needed for Pain. 15 tablet 0    rosuvastatin (CRESTOR) 5 MG tablet TAKE 1 TABLET(5 MG) BY MOUTH EVERY DAY 90 tablet 3    tamsulosin (FLOMAX) 0.4 mg Cap TAKE 2 CAPSULES(0.8 MG) BY MOUTH EVERY EVENING 180 capsule 3    temazepam (RESTORIL) 15 mg Cap Take 1 capsule (15 mg total) by mouth every night at bedtime. 90 capsule 1     No current facility-administered medications for this visit.       Review of patient's allergies indicates:   Allergen Reactions    No known drug allergies         Hemoglobin A1C   Date Value Ref Range Status   01/14/2025 4.7  4.0 - 5.6 % Final     Comment:     ADA Screening Guidelines:  5.7-6.4%  Consistent with prediabetes  >or=6.5%  Consistent with diabetes    High levels of fetal hemoglobin interfere with the HbA1C  assay. Heterozygous hemoglobin variants (HbS, HgC, etc)do  not significantly interfere with this assay.   However, presence of multiple variants may affect accuracy.     08/15/2024 4.8 4.0 - 5.6 % Final     Comment:     ADA Screening Guidelines:  5.7-6.4%  Consistent with prediabetes  >or=6.5%  Consistent with diabetes    High levels of fetal hemoglobin interfere with the HbA1C  assay. Heterozygous hemoglobin variants (HbS, HgC, etc)do  not significantly interfere with this assay.   However, presence of multiple variants may affect accuracy.     12/14/2017 4.5 4.0 - 5.6 % Final     Comment:     According to ADA guidelines, hemoglobin A1c <7.0% represents  optimal control in non-pregnant diabetic patients. Different  metrics may apply to specific patient populations.   Standards of Medical Care in Diabetes-2016.  For the purpose of screening for the presence of diabetes:  <5.7%     Consistent with the absence of diabetes  5.7-6.4%  Consistent with increasing risk for diabetes   (prediabetes)  >or=6.5%  Consistent with diabetes  Currently, no consensus exists for use of hemoglobin A1c  for diagnosis of diabetes for children.  This Hemoglobin A1c assay has significant interference with fetal   hemoglobin   (HbF). The results are invalid for patients with abnormal amounts of   HbF,   including those with known Hereditary Persistence   of Fetal Hemoglobin. Heterozygous hemoglobin variants (HbAS, HbAC,   HbAD, HbAE, HbA2) do not significantly interfere with this assay;   however, presence of multiple variants in a sample may impact the %   interference.         Review of Systems   Constitutional: Negative for chills and fever.   Cardiovascular:  Negative for claudication and leg swelling.   Skin:  Positive for color change and  nail changes.   Musculoskeletal:  Positive for myalgias. Negative for joint pain, joint swelling, muscle cramps and muscle weakness.   Gastrointestinal:  Negative for nausea and vomiting.   Neurological:  Negative for numbness and paresthesias.   Psychiatric/Behavioral:  Negative for altered mental status.         Objective:     Physical Exam  Constitutional:       Appearance: Normal appearance. He is not ill-appearing.   Cardiovascular:      Pulses:           Dorsalis pedis pulses are 2+ on the right side and 2+ on the left side.        Posterior tibial pulses are 2+ on the right side and 2+ on the left side.      Comments: CFT is < 3 seconds bilateral.  Pedal hair growth is present bilateral.  No lower extremity edema noted bilateral.  Toes are warm to touch bilateral.    Musculoskeletal:         General: Tenderness present. No deformity or signs of injury.      Right lower leg: No edema.      Left lower leg: No edema.      Comments: Muscle strength 5/5 in all muscle groups bilateral.  No tenderness nor crepitation with ROM of foot/ankle joints bilateral.  (+) for pain with palpation to the bursa overlying the Lt. Retrocalcaneal exostosis.  Lt. Sided gastrocnemius equinus with significant improvement in dorsiflexion.  (+) for pain with palpation to the Lt. Achilles tendon at its insertion.  (-) dell or defect noted to said tendon.     Skin:     General: Skin is warm and dry.      Capillary Refill: Capillary refill takes 2 to 3 seconds.      Findings: No bruising, ecchymosis, erythema, signs of injury, laceration, lesion, petechiae, rash or wound.      Comments: Pedal skin has normal turgor, temperature, and texture bilateral.  Significant mycotic changes noted to the Lt. Hallux toenail.  Remaining toenails x 9 appear normotrophic.     Right leg and foot incisions all well healed   Neurological:      General: No focal deficit present.      Mental Status: He is alert.      Sensory: No sensory deficit.      Motor: No  "weakness or atrophy.      Comments: Light touch is intact bilateral.             Assessment:      Encounter Diagnosis   Name Primary?    Post-operative state Yes               Plan:     Eric Razo" was seen today for post-op evaluation.    Diagnoses and all orders for this visit:    Post-operative state  -     Ambulatory Referral/Consult to Physical Therapy                I counseled the patient on his conditions, their implications and medical management.    Doing well can return to normal full activity no restrictions     Start with PT to help with his gait getting out of the boot    Return PRN    Jared Noriega DPM                       "

## 2025-08-15 RX ORDER — AMLODIPINE BESYLATE 5 MG/1
TABLET ORAL
Qty: 135 TABLET | Refills: 2 | OUTPATIENT
Start: 2025-08-15

## 2025-08-18 DIAGNOSIS — M25.561 ACUTE PAIN OF RIGHT KNEE: Primary | ICD-10-CM

## 2025-08-18 DIAGNOSIS — M47.816 SPONDYLOSIS OF LUMBAR REGION WITHOUT MYELOPATHY OR RADICULOPATHY: ICD-10-CM

## 2025-08-18 RX ORDER — MELOXICAM 15 MG/1
15 TABLET ORAL
Qty: 90 TABLET | Refills: 3 | Status: SHIPPED | OUTPATIENT
Start: 2025-08-18

## 2025-08-18 RX ORDER — LEVOTHYROXINE SODIUM 137 UG/1
TABLET ORAL
Qty: 90 TABLET | Refills: 3 | Status: SHIPPED | OUTPATIENT
Start: 2025-08-18

## 2025-08-20 ENCOUNTER — CLINICAL SUPPORT (OUTPATIENT)
Dept: REHABILITATION | Facility: HOSPITAL | Age: 80
End: 2025-08-20
Attending: PODIATRIST
Payer: MEDICARE

## 2025-08-20 DIAGNOSIS — Z74.09 IMPAIRED FUNCTIONAL MOBILITY, BALANCE, GAIT, AND ENDURANCE: Primary | ICD-10-CM

## 2025-08-20 PROCEDURE — 97161 PT EVAL LOW COMPLEX 20 MIN: CPT | Mod: PN

## 2025-08-20 PROCEDURE — 97110 THERAPEUTIC EXERCISES: CPT | Mod: PN

## 2025-08-21 ENCOUNTER — HOSPITAL ENCOUNTER (OUTPATIENT)
Dept: RADIOLOGY | Facility: HOSPITAL | Age: 80
Discharge: HOME | End: 2025-08-21
Attending: ORTHOPAEDIC SURGERY
Payer: MEDICARE

## 2025-08-21 ENCOUNTER — OFFICE VISIT (OUTPATIENT)
Dept: ORTHOPEDICS | Facility: CLINIC | Age: 80
End: 2025-08-21
Payer: MEDICARE

## 2025-08-21 VITALS — BODY MASS INDEX: 23.98 KG/M2 | WEIGHT: 177 LBS | HEIGHT: 72 IN

## 2025-08-21 DIAGNOSIS — K58.9 IRRITABLE BOWEL SYNDROME, UNSPECIFIED TYPE: ICD-10-CM

## 2025-08-21 DIAGNOSIS — M25.561 ACUTE PAIN OF RIGHT KNEE: ICD-10-CM

## 2025-08-21 DIAGNOSIS — M17.11 PRIMARY OSTEOARTHRITIS OF RIGHT KNEE: Primary | ICD-10-CM

## 2025-08-21 PROCEDURE — 73560 X-RAY EXAM OF KNEE 1 OR 2: CPT | Mod: 26,LT,, | Performed by: RADIOLOGY

## 2025-08-21 PROCEDURE — 1101F PT FALLS ASSESS-DOCD LE1/YR: CPT | Mod: CPTII,S$GLB,, | Performed by: ORTHOPAEDIC SURGERY

## 2025-08-21 PROCEDURE — 1160F RVW MEDS BY RX/DR IN RCRD: CPT | Mod: CPTII,S$GLB,, | Performed by: ORTHOPAEDIC SURGERY

## 2025-08-21 PROCEDURE — 99999 PR PBB SHADOW E&M-EST. PATIENT-LVL III: CPT | Mod: PBBFAC,,, | Performed by: ORTHOPAEDIC SURGERY

## 2025-08-21 PROCEDURE — 73562 X-RAY EXAM OF KNEE 3: CPT | Mod: 26,RT,, | Performed by: RADIOLOGY

## 2025-08-21 PROCEDURE — 99214 OFFICE O/P EST MOD 30 MIN: CPT | Mod: 25,S$GLB,, | Performed by: ORTHOPAEDIC SURGERY

## 2025-08-21 PROCEDURE — 73562 X-RAY EXAM OF KNEE 3: CPT | Mod: TC,PO,RT

## 2025-08-21 PROCEDURE — 3288F FALL RISK ASSESSMENT DOCD: CPT | Mod: CPTII,S$GLB,, | Performed by: ORTHOPAEDIC SURGERY

## 2025-08-21 PROCEDURE — 1126F AMNT PAIN NOTED NONE PRSNT: CPT | Mod: CPTII,S$GLB,, | Performed by: ORTHOPAEDIC SURGERY

## 2025-08-21 PROCEDURE — 1159F MED LIST DOCD IN RCRD: CPT | Mod: CPTII,S$GLB,, | Performed by: ORTHOPAEDIC SURGERY

## 2025-08-21 PROCEDURE — 20610 DRAIN/INJ JOINT/BURSA W/O US: CPT | Mod: RT,S$GLB,, | Performed by: ORTHOPAEDIC SURGERY

## 2025-08-21 RX ORDER — DOXEPIN HYDROCHLORIDE 50 MG/1
100 CAPSULE ORAL NIGHTLY
Qty: 180 CAPSULE | Refills: 3 | Status: SHIPPED | OUTPATIENT
Start: 2025-08-21

## 2025-08-21 RX ADMIN — TRIAMCINOLONE ACETONIDE 40 MG: 40 INJECTION, SUSPENSION INTRA-ARTICULAR; INTRAMUSCULAR at 01:08

## 2025-08-24 DIAGNOSIS — E78.5 HYPERLIPIDEMIA, UNSPECIFIED HYPERLIPIDEMIA TYPE: ICD-10-CM

## 2025-08-25 ENCOUNTER — CLINICAL SUPPORT (OUTPATIENT)
Dept: REHABILITATION | Facility: HOSPITAL | Age: 80
End: 2025-08-25
Payer: MEDICARE

## 2025-08-25 DIAGNOSIS — Z74.09 IMPAIRED FUNCTIONAL MOBILITY, BALANCE, GAIT, AND ENDURANCE: Primary | ICD-10-CM

## 2025-08-25 PROCEDURE — 97110 THERAPEUTIC EXERCISES: CPT | Mod: PN,CQ

## 2025-08-25 PROCEDURE — 97112 NEUROMUSCULAR REEDUCATION: CPT | Mod: PN,CQ

## 2025-08-25 PROCEDURE — 97530 THERAPEUTIC ACTIVITIES: CPT | Mod: PN,CQ

## 2025-08-25 RX ORDER — ROSUVASTATIN CALCIUM 5 MG/1
5 TABLET, COATED ORAL
Qty: 90 TABLET | Refills: 2 | Status: SHIPPED | OUTPATIENT
Start: 2025-08-25

## 2025-08-27 ENCOUNTER — CLINICAL SUPPORT (OUTPATIENT)
Dept: REHABILITATION | Facility: HOSPITAL | Age: 80
End: 2025-08-27
Payer: MEDICARE

## 2025-08-27 DIAGNOSIS — Z74.09 IMPAIRED FUNCTIONAL MOBILITY, BALANCE, GAIT, AND ENDURANCE: Primary | ICD-10-CM

## 2025-08-27 PROCEDURE — 97140 MANUAL THERAPY 1/> REGIONS: CPT | Mod: PN

## 2025-08-27 PROCEDURE — 97112 NEUROMUSCULAR REEDUCATION: CPT | Mod: PN

## 2025-08-27 PROCEDURE — 97530 THERAPEUTIC ACTIVITIES: CPT | Mod: PN

## 2025-08-27 PROCEDURE — 97110 THERAPEUTIC EXERCISES: CPT | Mod: PN

## 2025-08-28 RX ORDER — TRIAMCINOLONE ACETONIDE 40 MG/ML
40 INJECTION, SUSPENSION INTRA-ARTICULAR; INTRAMUSCULAR
Status: DISCONTINUED | OUTPATIENT
Start: 2025-08-21 | End: 2025-08-28 | Stop reason: HOSPADM

## 2025-09-03 ENCOUNTER — CLINICAL SUPPORT (OUTPATIENT)
Dept: REHABILITATION | Facility: HOSPITAL | Age: 80
End: 2025-09-03
Payer: MEDICARE

## 2025-09-03 DIAGNOSIS — Z74.09 IMPAIRED FUNCTIONAL MOBILITY, BALANCE, GAIT, AND ENDURANCE: Primary | ICD-10-CM

## 2025-09-03 PROCEDURE — 97110 THERAPEUTIC EXERCISES: CPT | Mod: PN

## 2025-09-03 PROCEDURE — 97112 NEUROMUSCULAR REEDUCATION: CPT | Mod: PN

## 2025-09-03 PROCEDURE — 97530 THERAPEUTIC ACTIVITIES: CPT | Mod: PN

## (undated) DEVICE — DRESSING XEROFORM NONADH 1X8IN

## (undated) DEVICE — DRESSING XEROFORM 1X8IN

## (undated) DEVICE — BANDAGE MATRIX HK LOOP 4IN 5YD

## (undated) DEVICE — DRAPE STERI-DRAPE 1000 17X11IN

## (undated) DEVICE — KIT ANTIFOG

## (undated) DEVICE — DRAPE HALF SURGICAL 40X58IN

## (undated) DEVICE — DRAPE THREE-QTR REINF 53X77IN

## (undated) DEVICE — ELECTRODE REM PLYHSV RETURN 9

## (undated) DEVICE — SYR 10CC LUER LOCK

## (undated) DEVICE — TEAR CROSS LRG 14MM X 7MM

## (undated) DEVICE — PAD CAST SPECIALIST STRL 4

## (undated) DEVICE — DRAPE EXTREMITY W/ABC NON-SLIP

## (undated) DEVICE — SUT ETHILON 3-0 FS-1 30

## (undated) DEVICE — BLADE MEDIUM 9MM X 25MM

## (undated) DEVICE — SOL IRR 0.9% NACL 500ML PB

## (undated) DEVICE — GLOVE SURG BIOGEL LATEX SZ 7.5

## (undated) DEVICE — SUT 4-0 VICRYL / SH

## (undated) DEVICE — DRAPE T EXTRM SURG 121X128X90

## (undated) DEVICE — STRAP OR TABLE 5IN X 72IN

## (undated) DEVICE — SPONGE DERMACEA 4X4IN 12PLY

## (undated) DEVICE — STOCKINET 4INX48

## (undated) DEVICE — HANDLE SURG LIGHT NONRIGID

## (undated) DEVICE — STOCKINET TUBULAR 1 PLY 6X60IN

## (undated) DEVICE — GLOVE PROTEXIS BLUE LATEX 7.5

## (undated) DEVICE — GLOVE SENSICARE PI GRN 7.5

## (undated) DEVICE — BANDAGE ROLL COTTN 4.5INX4.1YD

## (undated) DEVICE — GLOVE SENSICARE PI MICRO 7

## (undated) DEVICE — Device

## (undated) DEVICE — SUT ETHILON 4-0 PS2 18 BLK

## (undated) DEVICE — SUT VICRYL 3-0 27 SH

## (undated) DEVICE — GOWN POLY REINF BRTH SLV XL

## (undated) DEVICE — BLADE #15 STERILE CARBON

## (undated) DEVICE — SPONGE COTTON TRAY 4X4IN

## (undated) DEVICE — BANDAGE ESMARK LATEX FREE 4INX

## (undated) DEVICE — DRAPE C-ARM MINI DISP

## (undated) DEVICE — KIT SAHARA DRAPE DRAW/LIFT

## (undated) DEVICE — PENCIL ROCKER SWITCH 10FT CORD

## (undated) DEVICE — APPLICATOR CHLORAPREP ORN 26ML

## (undated) DEVICE — BLADE SURG #15 CARBON STEEL